# Patient Record
Sex: MALE | Race: BLACK OR AFRICAN AMERICAN
[De-identification: names, ages, dates, MRNs, and addresses within clinical notes are randomized per-mention and may not be internally consistent; named-entity substitution may affect disease eponyms.]

---

## 2018-08-22 ENCOUNTER — APPOINTMENT (OUTPATIENT)
Dept: INTERNAL MEDICINE | Facility: CLINIC | Age: 58
End: 2018-08-22
Payer: COMMERCIAL

## 2018-08-22 VITALS
DIASTOLIC BLOOD PRESSURE: 88 MMHG | TEMPERATURE: 98 F | WEIGHT: 150 LBS | BODY MASS INDEX: 22.73 KG/M2 | OXYGEN SATURATION: 97 % | HEART RATE: 63 BPM | SYSTOLIC BLOOD PRESSURE: 122 MMHG | HEIGHT: 68 IN

## 2018-08-22 DIAGNOSIS — A53.9 SYPHILIS, UNSPECIFIED: ICD-10-CM

## 2018-08-22 DIAGNOSIS — M54.32 SCIATICA, LEFT SIDE: ICD-10-CM

## 2018-08-22 DIAGNOSIS — M54.31 SCIATICA, RIGHT SIDE: ICD-10-CM

## 2018-08-22 PROCEDURE — 99215 OFFICE O/P EST HI 40 MIN: CPT

## 2018-08-22 NOTE — ASSESSMENT
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 — Radiculopathy is the medical term for the pain, numbness, or tingling that occurs when nerves coming from the spinal cord get pinched or damaged. Radiculopathy can affect different parts of the body, depending on which nerve or group of nerves is affected. People sometimes refer to radiculopathy as having a "pinched nerve."\par \par Here are 2 common examples of radiculopathy:\par \par ?Cervical radiculopathy – People with this type of radiculopathy have pain, numbness, or tingling down one or both arms. The condition happens when one or more of the nerves that go from the spine to the arm get pinched or damaged.\par \par \par ?Lumbosacral radiculopathy – People with this type of radiculopathy have pain, numbness, or tingling in the buttocks or down the leg. The condition happens when one or more of the nerves that go from the spine to the foot and leg get pinched or damaged. (People sometimes refer to the symptoms of this type of radiculopathy as "sciatica.")\par \par \par What causes radiculopathy? — Radiculopathy is usually caused by a problem with the back. To understand radiculopathy, it's helpful to first learn a little about the back and spine.\par \par The back is made up of (figure 1): \par \par ?Vertebrae – A stack of bones that sit on top of one another like a stack of coins. Each of these bones has a hole in the center. When stacked, the bones form a hollow tube that protects the spinal cord.\par \par \par ?Spinal cord and nerves – The spinal cord is the highway of nerves that connects the brain to the rest of the body. It runs through the vertebrae. Nerves branch from the spinal cord and pass in between the vertebrae. From there they connect to the arms, the legs, and the organs. (This is why problems in the back can cause leg pain or bladder problems.)\par \par \par ?Discs – Rubbery discs sit in between each of the vertebrae to add cushion and allow movement. The discs have a tough outer shell and jelly-like center.\par \par \par ?Muscles, tendons, and ligaments – Together the muscles, tendons, and ligaments are called the "soft tissues" of the back. These soft tissues support the back and help hold it together.\par \par \par Radiculopathy can happen when changes in the back cause a nerve to get pinched or damaged. This can happen if:\par \par ?The vertebrae form bumps called bone spurs, which press on nearby nerves. (People with a condition called "spinal stenosis" often have this problem.)\par \par \par ?The discs between the vertebrae break open and bulge out, causing them to press on or irritate nearby nerves. (A disc that breaks open and bulges is called a "herniated disc.") \par \par \par ?Other medical conditions, such as diabetes, infection, inflammation, or a tumor injure the nerves near the spinal cord.\par \par \par Should I see a doctor or nurse? — If you have new pain, numbness, or tingling that seems to spread out to your arms or legs from your spine, see a doctor or nurse.\par \par Will I need tests? — Maybe. Doctors can tell a lot about a person's radiculopathy based on which parts of the body are affected and how. Because of that, you might not need any tests, especially if you have had symptoms only for a short time. Still, if your doctor or nurse is concerned about nerve damage, he or she might order one or more of these tests:\par \par ?Imaging tests – Imaging tests, such as X-rays, MRIs, or CT scans, create pictures of the inside of the body. These imaging tests can show problems with the back like those described above.\par \par \par ?Electromyography (also called "EMG") – During this test, a technician checks how well electrical pulses travel across nerves to the part of your body that has symptoms. The test helps show whether the nerves controlling that body part are working right.\par \par \par How is radiculopathy treated? — Many people with radiculopathy do not need formal treatment. In some cases, the radiculopathy goes away as the back and nerves heal. In other cases, people find ways to cope with their symptoms.\par \par When people do get treatment, the treatments can include:\par \par ?Pain medicines that you can get without a prescription (If these do not work, stronger prescription pain medicines are available.)\par \par \par ?Medicines to relax the muscles (called muscle relaxants)\par \par \par ?Avoiding activities that make the pain worse\par \par \par ?Injections of medicines that numb the back or reduce swelling\par \par \par ?Physical therapy to learn special exercises and stretches\par \par \par ?Surgery to repair the problem causing symptoms \par  Peripheral artery disease (PAD), specifically atherosclerotic disease leading to peripheral artery obstruction, may be silent or present with a variety of symptoms and signs indicative of extremity ischemia. The clinical manifestations of arterial insufficiency (regardless of etiology) are due to a lack of blood flow to the musculature relative to its metabolism, which results in pain in the affected muscle groups. The presence of an extremity ulcer is one of the more obvious clinical signs that can be due to ischemia, but other manifestations, such as claudication and rest pain, should be actively sought out and differentiated from nonatherosclerotic and nonvascular conditions to ensure timely referral to a vascular specialist, when indicated. Ideally, a multidisciplinary approach involving the primary care provider, medical specialists, podiatrist, vascular specialist (interventionalist and/or surgeon), and plastic surgery provides optimal medical and surgical care. PAD is a treatable condition. When recognized early and appropriately managed, complications that can lead to limb loss can be minimized. \par \par The clinical features and diagnosis of lower extremity PAD are reviewed here. The epidemiology, risk factors, and natural history of PAD, as well as the medical and surgical treatment of PAD, are discussed elsewhere. (See "Epidemiology, risk factors, and natural history of peripheral artery disease" and "Surgical management of claudication" and "Percutaneous interventional procedures in the patient with lower extremity claudication".)\par \par The majority of patients with PAD have atherosclerotic disease of the lower extremity. Atherosclerotic PAD of the upper extremity is much less common and is discussed elsewhere. (See "Overview of upper extremity peripheral artery disease".) \par \par PERIPHERAL ARTERY DISEASE — Many pathologic processes can cause arterial obstruction leading to symptoms of arterial insufficiency because of reduced blood flow. In this topic, we focus on atherosclerosis as a cause of progressive narrowing of the lower extremity arteries, or as a source of atheroembolization. \par \par Variable terminology has been used to refer to arterial disease that is due to atherosclerosis. Older terms include arteriosclerotic disease and peripheral artery occlusive disease. In many resources, the term peripheral artery disease (PAD) is regarded as a general term encompassing a range of noncoronary arterial syndromes caused by the altered structure and function of the aorta and peripheral arteries due to numerous pathophysiological processes [1-3]. We regard the term PAD as denoting arterial disease affecting the peripheral (noncoronary) vasculature due to atherosclerosis, distinguishing PAD from other processes, which are given below and discussed in separate topic reviews. (See 'Other causes of arterial obstruction' below.)\par \par Epidemiology and risk factors — Risk factors for PAD are similar to those that promote the development of coronary atherosclerosis. (See "Overview of established risk factors for cardiovascular disease" and "Overview of possible risk factors for cardiovascular disease".)\par \par The American College of Cardiology/American Heart Association (ACC/AHA) guidelines on PAD identified the following groups with a higher prevalence of PAD [1]:\par \par ?Age =70 years\par \par \par ?Age 50 to 69 years with a history of smoking or diabetes \par \par \par ?Age 40 to 49 with diabetes and at least one other risk factor for atherosclerosis\par \par \par ?Leg symptoms suggestive of claudication with exertion, or ischemic pain at rest \par \par \par ?Abnormal lower extremity pulse examination\par \par \par ?Known atherosclerosis at other sites (eg, coronary, carotid, renal artery disease) \par \par \par Other risk factors include male gender, black ethnicity, a family history of atherosclerosis, smoking, hypertension, hyperlipidemia, and homocysteinemia. These risk factors and those listed above are discussed in detail elsewhere. (See "Epidemiology, risk factors, and natural history of peripheral artery disease".)\par \par CLINICAL PRESENTATIONS — Patients with PAD often have no complaints. However, if the supply of blood fails to satisfy ongoing metabolic requirements as a consequence of arterial narrowing, symptoms will occur, the severity of which depends upon the degree of arterial narrowing, number of arteries affected, and the activity level of the patients. PAD can present with pain of one or more lower extremity muscle groups related to activity (ie, intermittent claudication), atypical pain, pain at rest, or with nonhealing wounds, ulceration, or gangrene. (See 'Symptoms' below.)\par \par The 2005 American College of Cardiology/American Heart Association (ACC/AHA) guidelines on PAD suggested the following distribution of clinical presentation of PAD in patients =50 years of age [1,3]:\par \par ?Asymptomatic – 20 to 50 percent\par \par ?Atypical leg pain – 40 to 50 percent\par \par ?Classic claudication – 10 to 35 percent\par \par ?Threatened limb – 1 to 2 percent\par \par \par Asymptomatic patients screened for PAD — Patients with peripheral artery disease may be diagnosed based upon screening (algorithm 1). Recommendations for screening are discussed elsewhere. (See "Screening for lower extremity peripheral artery disease".)\par \par Screening identifies PAD by lower extremity examination and/or measurement of the ankle-brachial index (ANI). (See 'Diagnosis of lower extremity PAD' below.)\par \par Detection of asymptomatic PAD has value because it identifies patients at increased risk of atherosclerosis at other sites. Patients with asymptomatic PAD identified on screening also benefit from medical therapies (eg, aspirin, lipid lowering, blood pressure control, smoking cessation) that reduce their risk for myocardial infarction (MI), stroke, and death. (See "Prevention of cardiovascular disease events in those with established disease or at high risk".)\par \par Intermittent claudication and atypical lower extremity pain — Most symptomatic patients with PAD present with lower extremity pain, either as classic intermittent claudication or atypical leg pain. Intermittent claudication (derived from the Latin word for limp) is defined as a reproducible discomfort of a defined group of muscles that is induced by exercise and relieved with rest. Although the supply of blood may be adequate to meet the demands of inactive muscle, a mismatch develops between the supply of blood and increased demand induced by activity. This mismatch may also lead to atypical lower extremity pain. (See 'Claudication' below and 'Atypical extremity pain' below.)\par \par Atypical symptoms may be more common than classic claudication due to comorbidities, physical inactivity, and alterations in pain perception [4-6]. A low rate of classic claudication was noted in the report from the [employer] program, in which [id number] patients were at risk for PAD (either age =70 or age 50 to 69 with a history of diabetes or more than 10 pack-years of cigarette smoking) in a primary care setting [4]. PAD, identified by the ANI or by history, was present in 29 percent:\par \par ?Among the patients with a new diagnosis of PAD, approximately 47 percent had no history of leg symptoms, 47 percent had atypical leg symptoms, and only 6 percent had classic claudication.\par \par \par ?Among the patients with known prior PAD, approximately 25 percent had no leg symptoms, 61 percent had atypical leg symptoms, and 14 percent had classic claudication.\par \par \par Threatened limb — Patients with PAD can present initially with a threatened limb (critical limb ischemia). It has been estimated that 1 to 2 percent of patients with PAD who are 50 years of age or older present in this manner [1,3]. Limb threat is part of a broad disease spectrum, of which perfusion is one determinant of outcome. Other important factors include the extent and severity of any wounds, and the presence and severity of infection. (See 'Ulceration' below and 'Gangrene' below.)\par \par Ischemia sufficient to threaten a limb occurs when arterial blood flow is insufficient to meet the metabolic demands of resting muscle or tissue. Patients may present with varying degrees of tissue loss or frankly gangrenous digits, forefoot, or hindfoot. \par \par According to the 2007 Inter-Society Consensus for the Management of Peripheral Arterial Disease (TASC II), limb ischemia that manifests as a sudden decrease in limb perfusion presenting within two weeks of the inciting event is defined as acute [7]. Patients with similar manifestations who present more than two weeks later are considered to have chronic ischemia.\par \par Although acute limb ischemia sufficient to threaten the lower extremity is more commonly due to thromboembolism (eg, atrial fibrillation), it can be due to a sudden thrombotic occlusion of a narrowed arterial segment, occlusion from atheroembolic debris shed from a more proximal location, or occlusion due to arterial dissection. In patients who have undergone prior intervention for PAD, acute limb ischemia may be due to stent or bypass graft thrombosis. Acute limb ischemia can be stratified by symptoms (eg, pain, paresthesias) and physical findings (eg, severely diminished or absent pulses, coolness), which help to determine treatment (table 1). The clinical manifestations, diagnosis, and treatment of acute limb ischemia are discussed in detail elsewhere. (See "Clinical features and diagnosis of acute lower extremity ischemia".)\par \par Chronic ischemia sufficient to threaten the limb is often the result of arterial stenoses or occlusions that affect more than one level of the arterial tree; however, isolated tibial vessel disease frequently threatens the viability of the lower extremity in patients with diabetes, and in the elderly. This most often involves the aortoiliac and femoropopliteal segments, or femoropopliteal and tibial segments [3]. Multiple levels of disease promote severe ischemia by reducing the effectiveness of collateral flow and by lowering distal systolic pressures that drive tissue perfusion. The major manifestations of chronic ischemia are rest pain, ischemic ulceration, and gangrene. (See 'Ischemic rest pain' below and 'Ulceration' below and 'Gangrene' below.)\par \par CLINICAL FEATURES\par \par History — The prevalence of PAD increases progressively with age, beginning after age 40. As a result, PAD is growing as a clinical problem due to the aging population in the [place] and other developed countries. As such, a standard review during the examination of older patients should always include questions related to a history of walking impairment, extremity pain that might be due to ischemia, and the presence of nonhealing wounds [1]. \par \par Specific questions include:\par \par ?Does the patient have any pain with ambulation? If so, how far can the patient walk before the pain occurs? Does the pain cause the patient to stop walking? If so, after how much time is the patient able to resume walking? Does the pain recur after a similar walking distance? Has the patient's ability to walk diminished over time or altered the patient's lifestyle in any way?\par \par \par ?Does the patient experience any pain in the extremity that wakens them from sleep? If so, where is the pain located? Is the pain relieved once the foot is hung over the side of the bed? Does pain cause the patient to sleep sitting in a chair?\par \par \par ?Has the patient noticed any nonhealing wounds or ulcers on the toes? If so, how long have the wounds or ulcers been present? If wounds have occurred in the past, what measures were used to promote healing?\par \par \par ?Is the patient known to have PAD? If so, has the patient undergone any prior interventions to manage PAD, or other arterial disease?\par \par \par Patients with risk factors for PAD who report no or few symptoms should be asked about functional capacity and decline in activity over time. Several questionnaires estimating walking ability, functional capacity, and the psychosocial effects of PAD are available [8-16]. Although useful for quantifying data and following patients participating in clinical studies, these have limited use in routine clinical practice. \par \par Symptoms\par \par Lower extremity pain — Lower extremity pain is the predominant symptom in patients with PAD and is due to varying degrees of ischemia. Patients with PAD may complain of pain in the calf, thigh, or buttock brought on with activity and relieved with rest (ie, intermittent claudication), atypical leg pain, or constant pain in the forefoot aggravated by elevation and relieved by dependency (ie, rest pain). Less commonly, patients may present with diffuse, severe lower extremity pain as a manifestation of acute extremity ischemia.\par \par Claudication — Pain within a defined group of muscles that is induced by exercise and relieved with rest defines classic intermittent claudication (derived from the Latin word for limp). The severity of claudication symptoms reported by the patient depends upon the degree of stenosis, the effectiveness of collateral vascular channels, and the vigor of exercise. The perception of claudication can range from a bothersome discomfort of little consequence to a severe, debilitating pain that becomes lifestyle limiting [17]. A classification of PAD disease severity is presented elsewhere. (See "Classification of acute and chronic lower extremity ischemia", section on 'Symptom classification'.) \par \par Classic symptoms of claudication manifest as exertional leg pain that begins after a certain walking distance, causes the patient to stop walking, and resolves within 10 minutes of rest, allowing the patient to resume walking again, typically for the same distance after which the pain recurs. Claudication can present unilaterally or bilaterally, as buttock and hip, thigh, calf, or foot pain, singly or in combination. The usual relationships between pain location and corresponding anatomic site of PAD are as follows:\par \par ?Buttock and hip claudication – Patients with aortoiliac disease may complain of buttock, hip, and, in some cases, thigh claudication. The pain is often described as aching in nature and may be associated with weakness of the hip or thigh with walking. Pulses in one or both groins are diminished. Bilateral aortoiliac PAD that is severe enough to cause lower extremity symptoms almost always causes erectile dysfunction in men. Leriche syndrome is the triad of claudication, absent or diminished femoral pulses, and erectile dysfunction [18,19]. \par \par \par ?Thigh claudication – Atherosclerotic occlusion of the common femoral artery may induce claudication in the thigh, calf, or both. Patients with disease isolated to the superficial femoral or popliteal arteries have normal groin pulses but decreased pulses distally.\par \par \par ?Calf claudication – Calf claudication is the most common complaint. It is usually described as escalating pain that is consistently reproduced with exercise and relieved with rest. Pain in the upper two-thirds of the calf is usually due to superficial femoral artery stenosis, whereas pain in the lower third of the calf is due to popliteal disease. \par \par \par ?Foot claudication – Claudication of the foot is usually accompanied by occlusive disease of the tibial and peroneal vessels. Isolated foot claudication is uncommon with PAD.\par \par \par Atypical extremity pain — Among patients diagnosed with PAD, reports of atypical symptoms may be more common than classic claudication given comorbidities, physical inactivity, and alterations in pain perception [4-6]. In patients with multiple medical comorbidities, it is sometimes difficult to separate the contributions to extremity pain by arthritis, neuropathy, spinal stenosis, fibromyalgia, statin-induced myalgia, and other entities from those of PAD. Extremity pain due to PAD has its kathleen in the muscles and typical or atypical should follow an exercise-induced and rest-relieving pattern of onset and resolution with a repeatable cycle. Nevertheless, before attributing atypical symptoms to PAD, other lower extremity disorders must be considered [4,5,7]. (See 'Nonarterial etiologies for limb pain' below.) \par \par Ischemic rest pain — A severe decrease in limb perfusion can result in ischemic rest pain due to diffuse pedal ischemia. Ischemic rest pain is typically localized in the forefoot and toes and is not readily controlled by analgesics. Ischemic rest pain is brought on, or made worse by, elevation of the lower extremity, and is often worse when the patient reclines [20]. The pain can also be felt more proximally, but when this occurs, the pain usually does not spare the foot. Pain can be more localized in patients who develop an ischemic ulcer or gangrenous digits. (See 'Nonhealing wound/ulcer' below and 'Skin discoloration/gangrene' below.)\par \par Affected patients frequently find that the pain is relieved by hanging their feet over the edge of the bed, or, paradoxically (in contrast to claudication), by walking around the room because of the gravitational effect of dependence on extremity perfusion. \par \par Chronic reductions in extremity blood flow can also lead to a superimposed ischemic neuropathic pain that is frequently described as throbbing or burning, and/or severe shooting pains in the limb. In patients with diabetes, differentiating diabetic neuropathy from combined diabetic ischemic neuropathy can be difficult. (See "Evaluation of the diabetic foot".) \par \par Severe diffuse pain — Diffuse acute limb ischemia is characterized by the sudden onset of extremity pain progressing to numbness and finally paralysis of the extremity, accompanied by pallor, paresthesias, coolness, and absence of palpable pulses. In patients with PAD, diffuse ischemia can be due to atheroembolism, thrombotic occlusion of a stenotic artery, or thrombosis of a vascular prior stent or vascular reconstruction [21]. \par \par Nonhealing wound/ulcer — Ischemic ulcers often begin as minor traumatic wounds and then fail to heal because the blood supply is insufficient to meet the increased demands of the healing tissue (picture 1) [21]. Ischemic ulcers, which most often involve the foot, can become infected and may lead to osteomyelitis. In patients who are bedbound, lower extremity pressure ulcers can develop, and fail to heal with standard therapies. In patients with diabetes, nonhealing ulcers can develop over points of bony pressure. The general assessment of wounds and special considerations for pressure ulcers and diabetic ulcers are discussed in detail elsewhere. (See "Clinical assessment of chronic wounds" and "Epidemiology, pathogenesis, and risk assessment of pressure-induced skin and soft tissue injury" and "Evaluation of the diabetic foot".)\par \par Skin discoloration/gangrene — Extremity ischemia alters the appearance of the skin. Patients may notice focal areas of discoloration or skin color changes when their foot is elevated (pale or white) or lowered (redness). If the blood supply falls below what is necessary to meet minimal metabolic requirements, focal areas of ischemia that begin as skin discoloration can progress to full-thickness skin necrosis, which can progress into the deeper tissues (picture 2) [21]. \par \par The blue toe syndrome (picture 3), usually due to embolic occlusion of digital arteries with atheroembolic material from a proximal arterial source, may progress to a nonhealing ulcer or focal areas of gangrene if severe PAD is present. (See "Embolism from atherosclerotic plaque: Atheroembolism (cholesterol crystal embolism)".)\par \par Physical examination — Individuals with risk factors and those with symptoms suspicious for PAD (eg, claudication, rest pain, ulcer, gangrene) should undergo cardiovascular assessment. (See 'Epidemiology and risk factors' above.)\par \par The patient's vital signs should be recorded and abnormalities noted. The patient's temperature and blood pressure in each upper extremity should be documented and the higher of the two noted for ankle-brachial index calculation. Fever may indicate the presence of an infected ulcer, and the presence of tachycardia and tachypnea may support the diagnosis of a deep space infection of the foot that may not be readily apparent on physical examination. \par \par The vascular examination is best performed with the patient supine on the examination table and should be performed only after the patient has rested for at least 15 minutes and has warmed up if coming indoors from cold weather. Patients with advanced ischemia who do not tolerate having their feet elevated can be briefly placed supine to examine the abdomen and femoral vessels and thereafter seated upright to perform the remainder of the examination. The examination should include inspection of the skin of the extremities, examination of the abdomen, palpation of all peripheral pulses, auscultation for bruits, and extremity neurologic examination. \par \par The vascular examination in patients with PAD commonly reveals diminished or absent pulses below the level of arterial stenosis with occasional bruits over stenotic lesions and evidence of poor wound healing in the area of diminished perfusion [22,23]. Other physical findings may include an abnormal body habitus, skin color and nail changes, and abnormal venous filling time [22]. These physical signs help determine the extent and distribution of vascular disease.\par \par Extremity appearance — Changes in extremity appearance depend on the duration and severity of PAD. With significantly diminished blood flow, the skin becomes thin with functional loss of the dermal appendages, which is evident as dry, shiny, and hairless skin. However, one study found that a lack of lower extremity hair is not a predictor of PAD [24]. The nails may become brittle, hypertrophic, and ridged. Comparison of color and trophic changes between extremities can give a good indication of the severity of PAD unless bilateral disease is present, in which case the appearance of the extremities may approximate each other, and experience of the examiner is required to  the severity. \par \par Skin temperature and color — The color of the skin is produced by blood in the subpapillary layer and varies with temperature of the skin, position of the extremity, and degree of blood oxygenation (reduced hemoglobin appears blue). \par \par Skin temperature is an indicator of the blood flow rate in the dermal vessels, though flow is governed primarily by constriction or dilation of the arterioles to maintain a constant core temperature. The temperature of the skin as a marker of perfusion is useful and can be assessed by lightly palpating the skin with the back of the hand and comparing similar sites from one extremity to the other. An ischemic limb is cool, and demarcation of temperature gives a rough indication of the level of the occlusion. Assessment of temperature differences is confounded when both extremities are affected.\par \par The Buerger test involves first elevating the foot with the patient in the supine position and waiting until the veins have completely drained, and then placing the foot in a dependent position [25]. Elevation of the extremity above the level of the central venous pressure (rarely greater than 25 cm) permits the pooled venous blood to drain, allowing an accurate assessment of the degree of arterial flow [26]. The time of return of blood to the dependent extremity is a useful marker of the severity of disease (normally <20 seconds). \par \par ?The normal extremity will remain pink with elevation. \par \par \par ?Patients with significant PAD will have foot pallor with elevation, and, in the dependent position, a dusky flush will occur spreading proximally from the toes. The color can be rubrous or cyanotic depending on skin temperature.  \par \par \par ?In patients with chronic arterial occlusion, the arterioles are maximally dilated as a compensatory response to the chronic ischemia, which intensifies skin color changes. \par \par \par ?It is important to differentiate the rubor associated with arterial insufficiency from cellulitis accompanying an infective process. A red, cellulitic appearance will persist despite extremity elevation.\par \par \par ?In patients with acute arterial occlusion, the venules empty, leading to a chalky white skin appearance regardless of extremity position. \par \par \par Ulceration — Extremity ulcerations have a characteristic appearance depending upon their origin (table 2). Ulcerations caused by ischemia are typically located at the termination of arterial branches. They are commonly found on the tips of the toes and between the digits. Ischemic ulcers also form at sites of increased focal pressure, such as the lateral malleolus and metatarsal heads. The lesions often appear dry and punched out and are painful, but exhibit little bleeding. Ischemic ulcers are usually associated with the other clinical features of chronic ischemia, including pallor, hair loss, and nail changes, as discussed above. \par \par Some patients have combined arterial and venous disease and manifest signs of both arterial and venous insufficiency, including ulcers with a mixed etiology. Similarly, patients with diabetes can have arterial disease and peripheral neuropathy, each of which can contribute to ulcer formation. (See 'Nonarterial etiologies for ulceration' below.)\par \par Gangrene — In addition to ulcers, patients can have a frankly gangrenous digit(s), forefoot, or hindfoot. Gangrene can be described as either dry or wet. Dry gangrene is characterized by a hard, dry texture, usually occurring in the distal aspects of toes and fingers, often with a clear demarcation between viable and black, necrotic tissue. This form of gangrene is common in patients with PAD. Wet gangrene is characterized by its moist appearance, gross swelling, and blistering [27]. Wet gangrene represents a surgical emergency, and appropriate consultation should be made when identified.\par \par Pulses — The assessment of pulses of the patient with suspected PAD should include palpation of the brachial, radial, femoral, popliteal, dorsalis pedis, and posterior tibial arteries. The normal popliteal artery is often unable to be easily palpated but can usually be identified with Doppler. Assessment for bruits with a stethoscope should also be undertaken over the iliac arteries. The inability to easily palpate a given vessel should lead to interrogation with a handheld continuous wave Doppler ultrasound. (See "Noninvasive diagnosis of arterial disease", section on 'Continuous wave Doppler'.)\par \par Bedside ANI — The resting ankle-brachial systolic pressure index (ANI) is a simple test that can be performed at the bedside and should be measured in patients with one or more findings consistent with PAD on the review of symptoms or other findings on physical exam. The ANI is the ratio of the ankle systolic blood pressure divided by the brachial systolic pressure detected with a Doppler probe. In patients with no or mild to moderate symptoms, an ANI of <0.90 has a high degree of sensitivity and specificity for PAD, using arteriography as the reference standard [7]. Techniques for performing the ANI are discussed separately. (See "Noninvasive diagnosis of arterial disease", section on 'Ankle-brachial index'.)\par \par An attempt can be made to obtain an ANI in patients with more severe ischemia at the bedside; however, the patient may not tolerate inflation of the blood pressure cuff on the affected extremity, and Doppler signals in the pedal vessels may be too weak to accurately gauge cut-off pressures. (See 'Site and severity of PAD' below.)\par \par Neurologic assessment — Lower extremity neurologic examination is important and should include motor and sensory testing. In the patient with acute limb ischemia, sensory loss and progressive lower extremity motor loss are ominous signs indicating the need for prompt intervention. Patients with acute arterial or graft thrombosis superimposed upon chronic ischemia may be more tolerant depending on the effectiveness of collateral vessels. \par \par Chronic ischemia can cause varying patterns of sensory loss, progressing from distal to proximal as the severity of ischemia worsens. Diabetic patients may have a superimposed sensory neuropathy, which is typically in a glove-and-stocking distribution and reduced vibration sense and two-point discrimination. The use of monofilament gauges (Semmel-Jose Alberto) is a good objective way to assess for diabetic neuropathy. (See "Evaluation of the diabetic foot", section on 'Screening tests

## 2018-08-22 NOTE — HISTORY OF PRESENT ILLNESS
[FreeTextEntry1] : leg pain [de-identified] : Pt is a 58 yr old who is having pain in his lower back pain and radiates sown leg and has pain in his testicles and numbness in both legs  this started 2 yrs ago and was in right leg and then transferred to left leg and has constant pain.  He states last month he his right leg gave way and he fell to the floor.  When he is sleeping he feels a throbbing and wakes him up.  No swelling . He smoked cigarettes since May 2018 .  he has smoked for 45 yrs 1ppd. No redness of his skin over legs or swelling.  he states he cant climb steps and has sever pain in his legs and pins and needles.  he states he has pain with walking .  Problem 2 He states his partner cheated on him in May he had a test and told he is negative for HIV.  He wanted to be on prevention since he has been with a partner for 20 yrs who cheated and is concerned about contacting HIV.

## 2018-08-22 NOTE — COUNSELING
[Healthy eating counseling provided] : healthy eating [Sleep ___ hours/day] : Sleep [unfilled] hours/day [Engage in a relaxing activity] : Engage in a relaxing activity [Plan in advance] : Plan in advance [None] : None [Good understanding] : Patient has a good understanding of lifestyle changes and the steps needed to achieve self management goals

## 2018-08-22 NOTE — PHYSICAL EXAM
[No Acute Distress] : no acute distress [Well Nourished] : well nourished [Well Developed] : well developed [Well-Appearing] : well-appearing [Normal Sclera/Conjunctiva] : normal sclera/conjunctiva [PERRL] : pupils equal round and reactive to light [EOMI] : extraocular movements intact [Normal Oropharynx] : the oropharynx was normal [No JVD] : no jugular venous distention [Supple] : supple [No Respiratory Distress] : no respiratory distress  [Clear to Auscultation] : lungs were clear to auscultation bilaterally [No Accessory Muscle Use] : no accessory muscle use [Normal Percussion] : the chest was normal to percussion [Regular Rhythm] : with a regular rhythm [Normal S1, S2] : normal S1 and S2 [Normal Rate] : normal [Normal S1] : normal S1 [Normal S2] : normal S2 [No Murmur] : no murmurs heard [No Pitting Edema] : no pitting edema present [2+] : left 2+ [No Abnormalities] : the abdominal aorta was not enlarged and no bruit was heard [Soft, Nontender] : the abdomen was soft and nontender [No Mass] : no masses were palpated [No HSM] : no hepatosplenomegaly noted [Normal Posterior Cervical Nodes] : no posterior cervical lymphadenopathy [Normal Anterior Cervical Nodes] : no anterior cervical lymphadenopathy [Normal Kyphosis] : normal kyphosis [No Visual Abnormalities] : no visible abnormalities [Normal Lordosis] : normal lordosis [No Scoliosis] : no scoliosis [No Tenderness to Palpation] : no spine tenderness on palpation [No Masses] : no masses [Full ROM] : full ROM [No Pain with ROM] : no pain with motion in any direction [Intact] : all reflexes within normal limits bilaterally [Normal Station and Gait] : the gait and station were normal [Normal Motor Tone] : the muscle tone was normal [Involuntary Movements] : no involuntary movements were seen [Complexion Dark] : dark complexion [Multiple Tattoos] : multiple tattoos observed [Normal] : the cranial nerves were intact [Sensation Tactile Decrease] : light touch was intact [Sensation Pain / Temperature Decrease] : pain and temperature sensation was intact [Normal Mental Status] : the patient's orientation, memory, attention, language and fund of knowledge were normal [Appropriate] : appropriate [S3] : no S3 [S4] : no S4 [Rt] : no varicose veins of the right leg [Lt] : no varicose veins of the left leg [Right Carotid Bruit] : no bruit heard over the right carotid [Left Carotid Bruit] : no bruit heard over the left carotid [Right Femoral Bruit] : no bruit heard over the right femoral artery [Left Femoral Bruit] : no bruit heard over the left femoral artery [Bruit] : no bruit heard [Abnormal Color] : normal color and pigmentation [Skin Lesions 1] : no skin lesions were observed [Skin Turgor Decreased] : normal skin turgor [Impaired judgment] : intact judgment [Impaired Insight] : intact insight

## 2018-08-22 NOTE — PLAN
[FreeTextEntry1] :  pt has sciatica and had mri but I don’t see results in chart and will try and find out results.  he had it at [facility] which is closed he thinks.  he will see spine specialist and start Pt and also see neurologist again.  he will also have ct of lung for smoking and also vascular disease evaluation . I feel it is less claudication but he does have long hx of smoking.  colon cancer screening we again discussed this along with compliance and taking care of his health I spent 40 mins face to face  with pt  discussing his findings and recommendations  health smoking he stopped on memorial day

## 2018-08-23 LAB
25(OH)D3 SERPL-MCNC: 23.1 NG/ML
ALBUMIN SERPL ELPH-MCNC: 4.7 G/DL
ALP BLD-CCNC: 117 U/L
ALT SERPL-CCNC: 25 U/L
ANION GAP SERPL CALC-SCNC: 17 MMOL/L
AST SERPL-CCNC: 31 U/L
BASOPHILS # BLD AUTO: 0.01 K/UL
BASOPHILS NFR BLD AUTO: 0.2 %
BILIRUB SERPL-MCNC: 0.4 MG/DL
BUN SERPL-MCNC: 12 MG/DL
CALCIUM SERPL-MCNC: 10.1 MG/DL
CHLORIDE SERPL-SCNC: 102 MMOL/L
CHOLEST SERPL-MCNC: 197 MG/DL
CHOLEST/HDLC SERPL: 2.8 RATIO
CO2 SERPL-SCNC: 21 MMOL/L
CREAT SERPL-MCNC: 1.01 MG/DL
CRP SERPL-MCNC: <0.1 MG/DL
EOSINOPHIL # BLD AUTO: 0.16 K/UL
EOSINOPHIL NFR BLD AUTO: 2.8 %
ERYTHROCYTE [SEDIMENTATION RATE] IN BLOOD BY WESTERGREN METHOD: 14 MM/HR
FOLATE SERPL-MCNC: 7.3 NG/ML
GLUCOSE SERPL-MCNC: 88 MG/DL
HBV SURFACE AB SER QL: REACTIVE
HBV SURFACE AG SER QL: NONREACTIVE
HCT VFR BLD CALC: 46.8 %
HCV AB SER QL: NONREACTIVE
HCV S/CO RATIO: 0.16 S/CO
HDLC SERPL-MCNC: 70 MG/DL
HGB BLD-MCNC: 15.5 G/DL
HIV1+2 AB SPEC QL IA.RAPID: NONREACTIVE
IMM GRANULOCYTES NFR BLD AUTO: 0.4 %
LDLC SERPL CALC-MCNC: 106 MG/DL
LYMPHOCYTES # BLD AUTO: 2.74 K/UL
LYMPHOCYTES NFR BLD AUTO: 48.1 %
MAGNESIUM SERPL-MCNC: 1.9 MG/DL
MAN DIFF?: NORMAL
MCHC RBC-ENTMCNC: 32.1 PG
MCHC RBC-ENTMCNC: 33.1 GM/DL
MCV RBC AUTO: 96.9 FL
MONOCYTES # BLD AUTO: 0.39 K/UL
MONOCYTES NFR BLD AUTO: 6.8 %
NEUTROPHILS # BLD AUTO: 2.38 K/UL
NEUTROPHILS NFR BLD AUTO: 41.7 %
PLATELET # BLD AUTO: 216 K/UL
POTASSIUM SERPL-SCNC: 4 MMOL/L
PROT SERPL-MCNC: 7.7 G/DL
PSA SERPL-MCNC: 7.83 NG/ML
RBC # BLD: 4.83 M/UL
RBC # FLD: 13.6 %
SODIUM SERPL-SCNC: 140 MMOL/L
T PALLIDUM AB SER QL IA: NEGATIVE
TRIGL SERPL-MCNC: 103 MG/DL
VIT B12 SERPL-MCNC: 929 PG/ML
VZV AB TITR SER: POSITIVE
VZV IGG SER IF-ACNC: 508.6 INDEX
WBC # FLD AUTO: 5.7 K/UL

## 2018-09-24 ENCOUNTER — APPOINTMENT (OUTPATIENT)
Dept: INTERNAL MEDICINE | Facility: CLINIC | Age: 58
End: 2018-09-24
Payer: COMMERCIAL

## 2018-09-24 VITALS
SYSTOLIC BLOOD PRESSURE: 120 MMHG | WEIGHT: 151 LBS | HEIGHT: 68 IN | DIASTOLIC BLOOD PRESSURE: 88 MMHG | TEMPERATURE: 97.9 F | BODY MASS INDEX: 22.88 KG/M2 | OXYGEN SATURATION: 99 %

## 2018-09-24 DIAGNOSIS — R00.1 BRADYCARDIA, UNSPECIFIED: ICD-10-CM

## 2018-09-24 LAB — HEMOCCULT STL QL IA: NEGATIVE

## 2018-09-24 PROCEDURE — 99396 PREV VISIT EST AGE 40-64: CPT | Mod: 25

## 2018-09-24 PROCEDURE — 93000 ELECTROCARDIOGRAM COMPLETE: CPT

## 2018-09-24 PROCEDURE — 99406 BEHAV CHNG SMOKING 3-10 MIN: CPT | Mod: 25

## 2018-09-24 PROCEDURE — 82270 OCCULT BLOOD FECES: CPT

## 2018-09-24 NOTE — COUNSELING
[Healthy eating counseling provided] : healthy eating [Keep Food Diary] : Keep food diary [Low Fat Diet] : Low fat diet [Low Salt Diet] : Low salt diet [___ min/wk activity recommended] : [unfilled] min/wk activity recommended [Walking] : Walking [Sleep ___ hours/day] : Sleep [unfilled] hours/day [Engage in a relaxing activity] : Engage in a relaxing activity [Plan in advance] : Plan in advance [None] : None [Good understanding] : Patient has a good understanding of lifestyle changes and the steps needed to achieve self management goals

## 2018-09-24 NOTE — HEALTH RISK ASSESSMENT
[Good] : ~his/her~  mood as  good [FreeTextEntry1] : back pain  [] : No [One fall no injury in past year] : Patient reported one fall in the past year without injury [0] : 2) Feeling down, depressed, or hopeless: Not at all (0) [de-identified] : has appt with Dr Mesa [de-identified] : vaps smoked since age 12 1ppd stopped in  may 2018 [[id number]] : 0 [HIV Test offered] : HIV Test offered [Hepatitis C test offered] : Hepatitis C test offered [Change in mental status noted] : No change in mental status noted [Language] : denies difficulty with language [Behavior] : denies difficulty with behavior [Learning/Retaining New Information] : denies difficulty learning/retaining new information [Handling Complex Tasks] : denies difficulty handling complex tasks [Reasoning] : denies difficulty with reasoning [Spatial Ability and Orientation] : denies difficulty with spatial ability and orientation [None] : None [With Significant Other] : lives with significant other [# of Members in Household ___] :  household currently consist of [unfilled] member(s) [Employed] : employed [High School] : high school [Single] : single [# Of Children ___] : has [unfilled] children [Sexually Active] : sexually active [Feels Safe at Home] : Feels safe at home [Fully functional (bathing, dressing, toileting, transferring, walking, feeding)] : Fully functional (bathing, dressing, toileting, transferring, walking, feeding) [Fully functional (using the telephone, shopping, preparing meals, housekeeping, doing laundry, using] : Fully functional and needs no help or supervision to perform IADLs (using the telephone, shopping, preparing meals, housekeeping, doing laundry, using transportation, managing medications and managing finances) [Reports changes in hearing] : Reports no changes in hearing [Reports changes in vision] : Reports no changes in vision [Reports changes in dental health] : Reports changes in dental health [Smoke Detector] : smoke detector [Carbon Monoxide Detector] : carbon monoxide detector [Guns at Home] : no guns at home [Safety elements used in home] : safety elements used in home [Seat Belt] :  uses seat belt [Sunscreen] : does not use sunscreen [Travel to Developing Areas] : does not  travel to developing areas [TB Exposure] : is not being exposed to tuberculosis [Caregiver Concerns] : does not have caregiver concerns [BoneDensityComments] : hasn’t gone [ColonoscopyComments] : needs to be scheduled   [HIVDate] : 8/22/18 [HepatitisCDate] : 8/22/18 [de-identified] : last eye exam 4 months ago [de-identified] : last exam 4 months ago [Discussed at today's visit] : Advance Directives Discussed at today's visit

## 2018-09-24 NOTE — HISTORY OF PRESENT ILLNESS
[FreeTextEntry1] : cpe  [de-identified] : pt is a 58 yr old man who came for his cpe Pt states he fell while walking when his knee gave out  and didn’t sustain any injuries.  He has pins and needles in his legs constantly for two years and is getting worse  in the past 7 months.  and had osteoarthritis of his spine . He was given referrals for Pt and spine specialist .

## 2018-09-24 NOTE — ADDENDUM
[FreeTextEntry1] : Colorectal cancer develops in the large intestine (colon) or rectum. The primary goal of colon cancer screening is to prevent deaths from colon cancer. Screening tests can help identify cancers at an early and potentially curable stage. Screening can also prevent the development of cancer by identifying and treating precancerous abnormal growths that can be removed before they become malignant.\par \par Adults should undergo colon cancer screening beginning at age 50 or earlier, depending upon their risk of developing colorectal cancer. Several tests are available, each of which has advantages and disadvantages. The optimal screening test depends upon your preferences and your risk of developing colon cancer.\par \par This article discusses colon cancer risks, available screening tests, and recommendations for screening based upon your risks. There are additional topics about the screening tests themselves (see "Patient education: Flexible sigmoidoscopy (Beyond the Basics)" and "Patient education: Colonoscopy (Beyond the Basics)"), as well as about particular conditions. (See "Patient education: Colon polyps (Beyond the Basics)" and "Patient education: Crohn disease (Beyond the Basics)" and "Patient education: Ulcerative colitis (Beyond the Basics)".)\par \par WHY COLON CANCER SCREENING WORKS — Most colorectal cancers develop from precancerous polyps. Polyps are growths that arise in the lining of the colon and are visible when the bowel is examined by endoscopy (colonoscopy or sigmoidoscopy) or computed tomography (CT) scan (CT colonography [CTC]). There are two types of polyps: adenomatous and hyperplastic. Adenomatous polyps can become cancerous over time; this progression takes at least 10 years in most people.\par \par Colon cancer screening tests work by detecting polyps or early-stage cancers. If a polyp or cancer is found, it is removed to prevent it from becoming more serious. Regular screening for and removal of polyps reduces your risk of developing colorectal cancer (by up to 90 percent with colonoscopy). Early detection of cancers that are already present in the colon increases the chance of successful treatment and decreases the chance of dying as a result of the cancer.\par \par COLON CANCER RISK FACTORS — Several common characteristics increase the risk of colorectal cancer. While each individual risk factor adds some risk, risk can be substantially increased if several are present together.\par \par Factors that increase risk\par \par ?Family history of colorectal cancer – Having colorectal cancer in a family member increases your risk of cancer if the family member is a first-degree relative (a parent, brother or sister, or child), if several family members are affected, or if the cancers occurred at an early age (eg, before age 45 years). (See 'Family history of colorectal cancer' below.)\par \par \par ?Prior colorectal cancer or polyps – People who have previously had colorectal cancer have an increased risk of developing a new colorectal cancer. People who have had adenomatous polyps before the age of 60 years are also at increased risk for developing colorectal cancer. Screening recommendations for these groups are discussed separately. (See "Patient education: Colon polyps (Beyond the Basics)" and "Patient education: Colon and rectal cancer (Beyond the Basics)".)\par \par \par ?Increasing age – Although the average person has a 4.5 percent lifetime risk of developing colorectal cancer, 90 percent of these cancers occur in people older than 50 years of age. Risk increases with age throughout life.\par \par \par ?Lifestyle factors – Several lifestyle factors increase the risk of colorectal cancer, including:\par \par \par •A diet high in fat and red or processed meat and low in fiber\par \par •A sedentary lifestyle\par \par •Cigarette smoking\par \par •Alcohol use\par \par •Obesity\par \par \par Large increase in risk — Some conditions greatly increase the risk of colorectal cancer.\par \par Familial adenomatous polyposis — Familial adenomatous polyposis (FAP) is an uncommon inherited condition. Nearly 100 percent of people with this condition will develop colorectal cancer during their lifetime, and most of these cancers occur before the age of 50 years. FAP causes hundreds of polyps to develop throughout the colon beginning in adolescence. (See "Clinical manifestations and diagnosis of familial adenomatous polyposis".)\par \par Hereditary nonpolyposis colon cancer — Hereditary nonpolyposis colon cancer (HNPCC, also called Hernández syndrome) is another inherited condition associated with an increased risk of colorectal cancer. It is slightly more common than FAP but is still uncommon, accounting for less than 1 in 20 cases of colorectal cancer. About 70 percent of people with HNPCC will experience colorectal cancer by the age of 65. Cancer also tends to occur at younger ages. People with HNPCC are also at risk for other types of cancer, including cancer of the uterus, stomach, bladder, kidney, and ovary. (See "Hernández syndrome (hereditary nonpolyposis colorectal cancer): Clinical manifestations and diagnosis".)\par \par There are other rarer inherited conditions that increase risk of colorectal cancer, including mutY DNA glycosylase gene (MUTYH)-associated polyposis, hamartomatous polyposis, Peutz-Jeghers syndrome, and juvenile polyposis syndrome.\par \par Inflammatory bowel disease — People with Crohn disease of the colon or ulcerative colitis have an increased risk of colorectal cancer. The amount of increased risk depends upon the amount of inflamed colon and the duration of disease; pancolitis (inflammation of the entire colon) and colitis of 10 years' duration or longer are associated with the greatest risk for colorectal cancer. The risk of colon cancer is not increased in people with irritable bowel disease.\par \par Factors that may decrease risk — Aspirin, and related nonsteroidal antiinflammatory medications, may decrease the risk of developing colorectal cancer. (See "Patient education: Aspirin in the primary prevention of cardiovascular disease and cancer (Beyond the Basics)".)\par \par COLON CANCER SCREENING TESTS — Several tests available for colorectal cancer screening can detect precancerous polyps (adenomas) and can lead to cancer prevention and/or detect cancers at an early, more treatable stage.\par \par Guidelines from expert groups recommend that you and your health care provider discuss the available options and choose a testing strategy that makes sense for you. Some experts believe that tests that detect precancerous polyps are preferable, particularly colonoscopy [1]. Other experts believe that being screened (with any of the available tests, including stool tests that detect blood) is more important than the particular test that is chosen for the screening.\par \par Colonoscopy — Colonoscopy allows a clinician to see the lining of the rectum and the entire colon (figure 1). (See "Patient education: Colonoscopy (Beyond the Basics)".)\par \par ?Procedure – Colonoscopy requires that you prepare by cleaning out your entire colon and rectum. This usually involves consuming a liquid medication that causes temporary diarrhea. You are usually given a mild sedative drug before the procedure. During colonoscopy, a thin, lighted tube is used to directly view the lining of the rectum and the entire colon. Biopsies (small samples of tissue) can be taken during the procedure. Polyps and some cancers can be removed during this procedure.\par \par \par ?Effectiveness – Colonoscopy detects most small polyps and almost all large polyps and cancers and substantially reduces the risk of developing and dying from colorectal cancer [2].\par \par \par ?Risks and disadvantages – The risks of colonoscopy, while small, are greater than those of other screening tests. Colonoscopy may lead to serious bleeding or a tear of the intestinal wall in some individuals (about 1 in 1,000). Because the procedure usually requires sedation, you must be accompanied home after the procedure and you should not return to work or other activities on the same day.\par \par \par Sigmoidoscopy — Sigmoidoscopy allows a clinician to directly view the lining of the rectum and the lower part of the colon (the descending colon) (figure 1). This area accounts for about one-half of the total area of the rectum and colon. (See "Patient education: Flexible sigmoidoscopy (Beyond the Basics)".)\par \par ?Procedure – Sigmoidoscopy requires that you prepare by cleaning out the lower bowel. This usually involves consuming a clear liquid diet and using an enema shortly before the examination. Most people do not need sedative drugs and are able to return to work or other activities the same day. During the procedure, a thin, lighted tube is advanced into the rectum and through the left side of the colon to check for polyps and cancer; the procedure may cause mild cramping. Biopsies (small samples of tissue) can be taken during sigmoidoscopy. Sigmoidoscopy may be performed in a doctor's office.\par \par \par ?Effectiveness – Sigmoidoscopy can identify polyps and cancers in the descending colon and rectum with a high degree of accuracy. Studies have shown that sigmoidoscopy reduces the incidence of colorectal cancer and overall mortality [3].\par \par \par ?Risks and disadvantages – The risks of sigmoidoscopy are small. The procedure could create a small tear in the intestinal wall in about 2 per every 10,000 people; death from this complication is rare. A major disadvantage of sigmoidoscopy is that it cannot detect polyps or cancers that are located only in the right side (eg, the cecum or ascending colon to the hepatic flexure) or in the transverse colon, which are more common in older women.\par \par \par ?Additional testing – Having polyps or cancers in the lower colon increases the likelihood that there are polyps or cancer in the remaining part of the colon. Thus, if sigmoidoscopy reveals polyps or cancer, colonoscopy is recommended to view the entire length of the colon.\par \par \par CT colonography ("virtual colonoscopy") — Computed tomography colonography (CTC, sometimes called "virtual colonoscopy") is a test that uses a CT scanner to take images of the entire bowel. These images are two- and three-dimensional and are reconstructed to allow a radiologist to determine if polyps or cancers are present (picture 1). The major advantages of CTC are that it does not require sedation, it is noninvasive, the entire bowel can be examined, and abnormal areas (adenomas) can be detected about as well as with traditional (optical) colonoscopy.\par \par There are several disadvantages of CTC. Like traditional colonoscopy, CTC usually requires a bowel prep to clean out the colon. If an abnormal area is found with CTC, a traditional colonoscopy will be needed to see the area and take a tissue sample (biopsy). CTC may detect abnormalities other than polyps or cancer in the colon/rectum. Many of these incidental findings will require further testing that could lead to harm. CTC may not be covered by health insurance plans in the [place]. CTC, like many other imaging tests, exposes patients to radiation which may have long-term risks.\par \par Stool tests — Colorectal cancers often release microscopic amounts of blood and abnormal DNA into the stool. Stool tests can detect blood or abnormal DNA markers.\par \par Two types of tests, called guaiac tests (Hemoccult SENSA) and immunochemical tests, evaluate the stool for blood, which may be present if there is bleeding from a colon cancer (or other source of blood).\par \par ?With guaiac testing, you collect two samples of stool from three consecutive bowel movements, which you apply to home collection cards. You mail the cards back according to the instructions. You should avoid drugs that irritate the stomach, such as aspirin and nonsteroidal antiinflammatory drugs (NSAIDs), before collecting the stool.\par \par \par ?With immunochemical testing, you use a long-handled tool to collect the specimen according to the 's instructions. You apply the brush to a kit and then mail the kit back according to instructions. You do not have to change your diet or stop any medications with this test. Immunochemical testing is more convenient and somewhat better able to find cancer than guaiac testing, but the test kit is a bit more expensive.\par \par \par Stool testing for blood, when performed once per year, reduces the risk of dying from colorectal cancer by one-third or more [4]. However, because polyps seldom bleed, stool testing for blood is less likely to detect polyps than other screening tests. In addition, only 2 to 5 percent of people with a positive stool test actually have colorectal cancer. If the stool test is positive, your entire colon should be examined with colonoscopy.\par \par A DNA test is another option and is done every three years. This test looks for specific DNA markers that may signify the presence of a colon cancer, and it also looks for blood in the stool. For this test, you get a special kit in order to collect a whole bowel movement. Then you follow the instructions about how and where to ship it. An abnormal test should be followed up by colonoscopy.\par \par Fecal occult blood test and sigmoidoscopy — Combined screening with a fecal immunochemical test and sigmoidoscopy is a possible screening strategy and may be more effective than either test done alone. The sensitive fecal occult blood test can be used in place of the immunochemical test if necessary.\par \par COLON CANCER SCREENING PLANS — The colon cancer screening plan that is right for you depends upon your risk of colorectal cancer.\par \par Average risk of colorectal cancer — People with an average risk of colorectal cancer should begin screening at age 50. Any one of the following screening strategies is recommended [1]:\par \par ?Colonoscopy every 10 years\par \par ?Computed tomographic colonography (CTC) every five years\par \par ?Flexible sigmoidoscopy every five years, with or without an immunochemical stool test\par \par ?Stool testing every year (for guaiac and immunochemical occult blood tests)\par \par ?Stool testing every three years using a DNA assay and a collection of a full bowel movement\par \par \par Increased risk of colorectal cancer — Screening plans for people with an increased risk may entail screening at a younger age, more frequent screening, and/or the use of more sensitive screening tests (usually colonoscopy). The optimal screening plan depends upon the reason for increased risk.\par \par Family history of colorectal cancer\par \par ?People who have one first-degree relative (parent, brother, sister, or child) with colorectal cancer or adenomatous polyps at a young age (before the age of 60 years), or two first-degree relatives diagnosed at any age, should begin screening for colon cancer earlier, typically at age 40, or 10 years younger than the earliest diagnosis in their family, whichever comes first. Screening usually involves colonoscopy every five years. (See "Screening for colorectal cancer in patients with a family history of colorectal cancer".)\par \par \par ?People who have one first-degree relative (parent, brother, sister, or child) who has experienced colorectal cancer or adenomatous polyps at age 60 or later, or two or more second-degree relatives (grandparent, aunt, uncle) with colorectal cancer, should begin screening by colonoscopy earlier at age 40, but screening should be repeated as for average-risk people.\par \par \par ?People with a second-degree relative (grandparent, aunt, or uncle) or third-degree relative (great-grandparent or cousin) with colorectal cancer are considered to have an average risk of colorectal cancer. (See 'Average risk of colorectal cancer' above.)\par \par \par Some people have known genetically-based colon cancer syndromes in their family, such as familial adenomatous polyposis (FAP) or hereditary nonpolyposis colon cancer (HNPCC). These less common conditions require aggressive screening and preventive treatments, and individuals with these conditions in their family should be managed by a clinician with clinical expertise in these syndromes. (See "Familial adenomatous polyposis: Screening and management of patients and families" and "Hernández syndrome (hereditary nonpolyposis colorectal cancer): Screening and management" and "Juvenile polyposis syndrome".)\par \par Inflammatory bowel disease — People with ulcerative colitis or Crohn disease have an increased risk of colon cancer. The best screening plan depends upon how much of the colon is affected and how long you have had the disease. (See "Patient education: Crohn disease (Beyond the Basics)" and "Patient education: Ulcerative colitis (Beyond the Basics)" and "Surveillance and management of dysplasia in patients with inflammatory bowel disease".)\par \par WHERE TO GET MORE INFORMATION — Your health care provider is the best source of information for questions and concerns related to your medical problem.\par \par

## 2018-09-24 NOTE — ASSESSMENT
[FreeTextEntry1] : health maintenance Pt is up to date with his eye and dental exam. he has not gone for his bone density.  he needs a colonoscopy for colon cancer screening and we discussed options.  Smoker He will have ct of lungs and we discussed stopping and vaping  and risks  of cancer heart disease , vascular disease.  I referred him for doppler of arteries and he has not gone.  he has not seen pt or spine or neurologist . He has appt for Dr Mesa and also  for his prostate.  He has elevated psa  .  Lumbar radiculopathy  pt will need mri.  sinus bradycardia - Pt will be referred to cardiologist prior to  colonoscopy   Elevated psa he will see urologist.  Back pain He has been on meloxicam without improvement and will be started on medrol moisés  to see if he improves and understands side effects of steroids.   All topics are updated as new evidence becomes available and our peer review process is complete. \par \par  Literature review current through:  Aug 2018. |  This topic last updated:  2018. \par  \par \par INTRODUCTION — Cigarette smoking is the leading preventable cause of mortality, responsible for nearly six million deaths worldwide and over 400,000 deaths in the [place] annually [1,2]. If current trends continue, tobacco will kill more than eight million people worldwide each year by the year 2030. The three major causes of smoking-related mortality are atherosclerotic cardiovascular disease, lung cancer, and chronic obstructive pulmonary disease (COPD) [2].\par \par Smokers who stop smoking reduce their risk of developing and dying from tobacco-related illnesses [3,4]. Screening all patients for tobacco use and providing smokers with behavioral counseling and pharmacotherapy to stop smoking are among the most valuable preventive services that can be offered in health care [5].\par \par This topic will discuss the benefits and risks of smoking cessation. Management of smoking cessation, including the use of behavioral and pharmacologic therapies, is discussed in detail separately. (See "Overview of smoking cessation management in adults" and "Behavioral approaches to smoking cessation" and "Pharmacotherapy for smoking cessation in adults".)\par \par BENEFITS OF SMOKING CESSATION — Smoking cessation is associated with substantial health benefits for all smokers [6]. The extent of benefit partly depends on the intensity and duration of prior tobacco smoke exposure. Smokers who stop smoking can be expected to live longer and are less likely to develop tobacco-related diseases, including coronary heart disease, cancer, and pulmonary disease. Smokers also benefit from quitting smoking even after the development of smoking-related diseases, such as coronary heart disease or chronic obstructive pulmonary disease (COPD).\par \par All-cause mortality — Up to one-half of all smokers can be expected to die from a tobacco-related illness [1]. In one population-based cohort of nearly 50,000 people aged 40 to 70 years in [place], the years of life lost were 1.4 years in women and 2.7 years in men among those who smoked =20 cigarettes daily, compared with those who never smoked [7].\par \par Smoking cessation is associated with a mortality benefit for both men and women of all ages [4,7-10]. Stopping smoking at younger ages, especially before age 40, is associated with a larger decline in premature mortality than stopping at a later age [3,7,11]. However, quitting smoking after age 60 years is still associated with a lower risk of death compared with older adults who continue to smoke [7,10,12,13]. Even in smokers over age 80, quitting smoking appears to reduce mortality [12].\par \par Cardiovascular disease — Cigarette smoking is estimated to be responsible for >10 percent of all cardiovascular deaths worldwide [14] and 33 percent of all cardiovascular deaths in the [place] [15]. (See "Cardiovascular risk of smoking and benefits of smoking cessation", section on 'Dose and duration of smoking exposure'.)\par \par Nicotine from tobacco use can lead to several harmful effects on the cardiovascular system, including coronary vasoconstriction, increased hypercoagulability, dyslipidemia, and endothelial dysfunction. Smoking cessation is associated with a substantial reduction in the risk of cardiovascular events (including myocardial infarction, sudden cardiac death, and stroke) for both individuals with and without a prior history of cardiovascular disease. (See "Cardiovascular risk of smoking and benefits of smoking cessation".)\par \par Smoking cessation also reduces the progression of symptomatic peripheral artery disease and is associated with a reduced risk of recurrent stroke. (See "Overview of secondary prevention of ischemic stroke".)\par \par Malignancy — Smoking is a major risk factor for many types of cancer (table 1), and tobacco cessation is associated with a reduction in cancer risk. Among smokers with a smoking-related cancer, smoking cessation may also decrease the risk of developing a second smoking-related malignancy [16]. The relationships between smoking and different types of cancer are discussed in detail separately:\par \par ?(See "Cigarette smoking and other possible risk factors for lung cancer".)\par \par ?(See "Multiple primary lung cancers".)\par \par ?(See "Factors that modify breast cancer risk in women", section on 'Smoking'.)\par \par ?(See "Epidemiology and risk factors for head and neck cancer", section on 'Tobacco products'.)\par \par ?(See "Risk factors for gastric cancer", section on 'Smoking'.)\par \par ?(See "Epidemiology and nonfamilial risk factors for exocrine pancreatic cancer", section on 'Cigarette smoking'.)\par \par ?(See "Epidemiology, pathology, and pathogenesis of renal cell carcinoma", section on 'Smoking'.)\par \par ?(See "Epidemiology and risk factors of urothelial (transitional cell) carcinoma of the bladder", section on 'Smoking cessation'.)\par \par ?(See "Epithelial carcinoma of the ovary, fallopian tube, and peritoneum: Epidemiology and risk factors", section on 'Cigarette smoking'.)\par \par ?(See "Cervical intraepithelial neoplasia: Terminology, incidence, pathogenesis, and prevention", section on 'Cigarette smoking'.)\par \par ?(See "Carcinoma of the penis: Epidemiology, risk factors, and pathology", section on 'Other factors'.)\par \par ?(See "Classification and epidemiology of anal cancer", section on 'Cigarette smoking'.)\par \par ?(See "Cancer prevention", section on 'Tobacco use'.)\par \par \par Pulmonary disease — Epidemiologic studies indicate that cigarette smoking is overwhelmingly the most important risk factor for COPD.\par \par Smoking cessation reduces the accelerated decline of lung function and risk of incident COPD associated with smoking [17]. In addition, the majority of smokers with cough and sputum production with early COPD have an improvement in symptoms in the first 12 months after cessation [18]. The risk of COPD exacerbations also declines over time after smoking cessation [19]. (See "Chronic obstructive pulmonary disease: Risk factors and risk reduction", section on 'Smoking cessation'.)\par \par Smoking is associated with other chronic lung conditions, including asthma and respiratory bronchiolitis. Smoking cessation leads to an improvement in respiratory symptoms in these patients. (See "Risk factors for asthma", section on 'Smoking and exposure to environmental tobacco smoke' and "Respiratory bronchiolitis-associated interstitial lung disease", section on 'Smoking cessation'.)\par \par Infections — Cigarette smoking is associated with an increased risk of several types of infection, including tuberculosis, pneumococcal pneumonia, Legionnaires disease, meningococcal disease, influenza, and the common cold [20,21]. Although smoking cessation may reduce the risk of several types of infection, there are little data available to support this. (See "Epidemiology of tuberculosis", section on 'Risk factors' and "Pneumococcal pneumonia in adults", section on 'Smoking' and "Microbiology, epidemiology, and pathogenesis of Legionella infection", section on 'Host risk factors' and "Epidemiology of Neisseria meningitidis infection", section on 'Other host factors'.)\par \par Diabetes — The number of cigarettes smoked daily is associated with an increased risk for developing type 2 diabetes mellitus over the long-term. This may be partly due to nicotine’s effect on impaired insulin sensitivity. Although there does appear to be an increased risk of developing type 2 diabetes shortly after quitting tobacco use (perhaps partly due to weight gain), smoking cessation reduces the risk of diabetes after several years of abstinence [22]. (See "Risk factors for type 2 diabetes mellitus", section on 'Smoking'.)\par \par Osteoporosis and hip fracture — Smoking accelerates bone loss and is a risk factor for hip fracture in women [23]. Smoking cessation can reverse loss of bone mineral density and decrease the excess risk of hip fracture after approximately 10 years after quitting tobacco use (relative risk [RR] 0.7, 95% CI 0.5-0.9) [24,25]. (See "Osteoporotic fracture risk assessment", section on 'Cigarette smoking' and "Overview of the management of osteoporosis in postmenopausal women", section on 'Cessation of smoking'.)\par \par Reproductive disorders — Smoking is associated with an increased risk of several reproductive disorders, including complications during pregnancy, premature menopause, erectile dysfunction, and subfertility in both men and women. In particular, maternal smoking is associated with spontaneous , ectopic pregnancy, lower birth weight, and a number of diseases in the fetus, some of which may develop late in life. Maternal smoking cessation results in improved fetal and maternal outcomes. (See "Cigarette and tobacco products in pregnancy: Impact on pregnancy and the ".)\par \par Peptic ulcer disease — Gastric and duodenal ulcer disease is more likely to occur and take more time to heal in smokers compared with nonsmokers [26]. Smoking is associated with Helicobacter pylori infection, a well-established etiologic agent for peptic ulcer disease [27]. Persistent smoking increases treatment failure rates for H. pylori eradication [27]. Smoking cessation decreases the risk of developing peptic ulcer disease and accelerates the rate of healing in established disease [28,29]. (See "Epidemiology and etiology of peptic ulcer disease".)\par \par Periodontal disease — The number of cigarettes smoked daily is associated with an increased risk of developing periodontal disease, including gingivitis and periodontitis [30]. In a large population-based survey, the risk of periodontitis in former smokers declined with the number of years after smoking cessation [31]. (See "Gingivitis and periodontitis in adults: Classification and dental treatment".)\par \par Postoperative complications — Smoking cessation prior to surgery may prevent postoperative complications, including delayed wound healing and pulmonary complications. In addition, longer periods of smoking cessation prior to surgery are associated with lower rates of postoperative complications. (See "Preoperative medical evaluation of the adult healthy patient", section on 'Smoking'.)\par \par Other — Smoking has also been associated other adverse health effects. A study using pooled data from five large cohorts including over 420,000 men and 530,000 women aged =55 years found that compared with never-smokers, current smokers had an increased risk of mortality from renal failure (RR 2.0; 95% CI 1.7-2.3), intestinal ischemia (RR 6.0, 95% CI 4.5-8.1), hypertensive heart disease (RR 2.4, 95% CI 1.9-3.0), any infection (RR 2.3, 95% CI 2.0-2.7), breast cancer (RR 1.3, 95% CI 1.2-1.5), and prostate cancer (RR 1.4, 95% CI 1.2-1.7) [32]. The study also found an increased risk of mortality from respiratory illnesses other than pneumonia, influenza, COPD, and pulmonary fibrosis (RR 2.0, 95% CI 1.6-2.4). Smoking cessation decreased the risks, with the risks continuing to decrease as the duration of smoking cessation increased.\par \par QUESTIONABLE UTILITY OF SMOKING REDUCTION — Reducing the number of cigarettes smoked daily has been advocated as a possible alternative to complete cessation in patients who are unable to quit smoking. However, few data are available that support a strategy for a reduction in smoking, compared with complete cessation. Smoking reduction remains controversial as a strategy for reducing the health risks of smokers [33]. Complete smoking cessation is preferable.\par \par Health risks are found even with consistent low-level smoking (10 or fewer cigarettes a day). A prospective cohort study of people aged 59 to 82 years at baseline found all-cause mortality and cancer incidence were higher among consistent low-level smokers than never-smokers [34,35]. Those who smoked 1 to 10 cigarettes daily had higher all-cause mortality (hazard ratio [HR] 1.87, 95% CI 1.64-2.13); even those who smoked <one cigarette daily had an elevated risk (HR 1.64, 95% CI 1.07-2.51) [34]. People consistently smoking 1 to 10 cigarettes daily were also 2.34 (95% CI 5 1.86-2.93) times more likely to develop a smoking-related cancer [35]. Quitting smoking lowered their risks; the younger they were when quitting, the lower their risks.\par \par Data are inconsistent as to whether reducing cigarette smoking from higher to lower levels is associated with improved outcomes. At least two prospective cohort studies found that smokers who reduced smoking by at least 50 percent had no change in all-cause mortality, whereas those who quit smoking completely had decreased risks of all-cause mortality [36,37]. However, another cohort study did find reduced risk for mortality associated with smoking reduction (HR 0.85, 95% CI 0.77-0.95); the benefit with smoking reduction was mainly seen in heavy smokers and was mainly due to a reduction in cardiovascular mortality [38]. Nevertheless, consistent benefits in cardiovascular disease risk have not been seen with reduction in smoking short of quitting [15]. This is because even low levels of tobacco smoke exposure increase cardiovascular risk. A separate cohort study found that a reduction in smoking may decrease the risk of lung cancer (table 2) [39]. (See "Cigarette smoking and other possible risk factors for lung cancer", section on 'Smoking reduction'.)\par \par One reason that a reduction in smoking may not consistently improve health outcomes is that smokers may compensate for smoking reduction with increased puffs, volume, or duration in order to maintain nicotine intake and forestall nicotine withdrawal symptoms.\par \par RISKS OF SMOKING CESSATION — Although the risks of smoking cessation are far outweighed by the benefits, these risks are important to address in order to maximize the likelihood that a patient will successfully quit tobacco use. (See "Behavioral approaches to smoking cessation".)\par \par Nicotine withdrawal syndrome — Nicotine is a potent psychoactive drug that causes physical dependence and tolerance [6]. In the absence of nicotine, a smoker develops cravings for cigarettes and symptoms of the nicotine withdrawal syndrome. Symptoms generally peak in the first three days and subside over the next three to four weeks, but smokers’ cravings for cigarettes may persist for months to years. Nicotine withdrawal symptoms include:\par \par ?Increased appetite or weight gain\par \par ?Dysphoric, depressed mood, or anhedonia [40]\par \par ?Insomnia\par \par ?Irritability, frustration, or anger\par \par ?Anxiety\par \par ?Difficulty concentrating\par \par ?Restlessness\par \par \par These factors should be addressed so that smokers will know what to expect and how to respond if these symptoms occur. Smoking cessation medications, including nicotine replacement therapy, bupropion, and varenicline, relieve the symptoms of nicotine withdrawal (see "Pharmacotherapy for smoking cessation in adults"). Nonpharmacologic approaches can also help to manage nicotine withdrawal symptoms. (See "Behavioral approaches to smoking cessation".)\par \par Weight gain — Weight gain commonly occurs after cessation of smoking. Weight gain is a principal fear of those who are considering smoking cessation, especially women. The mechanisms behind weight gain appear to be decreased metabolic rate, increased activity of lipoprotein lipase, changes in food preferences, and increased caloric intake [41]. Weight gain of 1 to 2 kg in the first two weeks is usually followed by an additional 2 to 3 kg weight gain over the next four to five months [42,43]. The average total weight gain is 4 to 5 kg, but may be much greater. Ten percent or more of quitters may gain over 13 kg after smoking cessation. In general, the amount of weight gain is greater in women than men, nonwhites compared with whites, and heavier smokers compared with lighter smokers [44].\par \par While there are well-recognized health hazards of obesity (see "Overweight and obesity in adults: Health consequences"), these are outweighed by the health benefits of quitting smoking, which are much larger than the additional risk conferred by the weight gain. Behavioral counseling that addresses weight gain, including dietary or physical activity interventions, has some success in limiting weight gain (see "Behavioral approaches to smoking cessation"). Weight gain can also be temporarily blunted in smokers who quit with bupropion, an antidepressant medication effective for smoking cessation. (See "Pharmacotherapy for smoking cessation in adults", section on 'Bupropion'.)\par \par Depression — Nicotine withdrawal syndrome includes symptoms of depression and anxiety, particularly in those with a history of psychiatric illness. In patients with documented depression, smoking cessation has been reported to trigger depressive episodes that require behavioral counseling, antidepressant medication, or both [45,46]. However, despite this increase in depressive symptoms, subsequent studies have found that the benefits of smoking cessation outweigh the risks in patients with psychiatric illness [47,48]. (See "Unipolar depression in adults: Assessment and diagnosis".)\par \par It is unclear if smoking cessation causes depression in smokers who do not have psychiatric illness at baseline. In one large cohort of smokers without depression or anxiety, there was no increase in symptoms of depression or anxiety after quitting tobacco use [49].\par \par Cough and mouth ulcers — A temporary increase in cough and aphthous ulcers can occur in the first few weeks after stopping smoking [50-52]. The pathophysiology is not well-understood. Coughing and aphthous ulcers generally resolve several weeks after the quit date. Individuals with bronchitis who quit may also report an increase in cough, but the most common experience is a decrease is symptoms as noted above [18].\par \par COST-EFFECTIVENESS — Smoking cessation interventions, including both counseling and medications, are highly cost-effective when compared with other commonly accepted preventive health practices (table 3) [53,54]. One study examined several smoking cessation strategies with and without nicotine replacement and calculated an approximate cost of USD $2587 per net year of life gained, based upon published estimates of success and mortality differences for smokers and nonsmokers [54]. A second study examined smokers at a single institution who completed an initial 60-minute nicotine dependence consultation and six two-hour evening sessions devoted to smoking cessation [53]. The cohort was followed for one year, and based upon quit rates at that time and published mortality rates by age, sex, smoking status, and number of years since quitting, a mathematical model estimated a cost of USD $6828 per net year of life gained. Both of these estimates are considerably more favorable than those of other common interventions. As one example, treatment of mild hypertension costs approximately USD $24,000 per net year of life gained [55].\par \par Subsequent studies have found several behavioral and pharmacologic therapies for smoking cessation to be cost-effective [56]. Telephone quitlines appear to be among the most cost-effective strategies [57].\par \par INFORMATION FOR PATIENTS — [employer] offers two types of patient education materials, “The Basics” and “Beyond the Basics.” The Basics patient education pieces are written in plain language, at the 5th to 6th grade reading level, and they answer the four or five key questions a patient might have about a given condition. These articles are best for patients who want a general overview and who prefer short, easy-to-read materials. Beyond the Basics patient education pieces are longer, more sophisticated, and more detailed. These articles are written at the 10th to 12th grade reading level and are best for patients who want in-depth information and are comfortable with some medical jargon.\par \par Here are the patient education articles that are relevant to this topic. We encourage you to print or e-mail these topics to your patients. (You can also locate patient education articles on a variety of subjects by searching on “patient info” and the keyword(s) of interest.)\par \par ?Basics topics (see "Patient education: Quitting smoking (The Basics)")\par \par \par ?Beyond the Basics topics (see "Patient education: Quitting smoking (Beyond the Basics)")\par \par \par SUMMARY AND RECOMMENDATIONS\par \par ?Cigarette smoking is the leading preventable cause of mortality and is estimated to cause nearly six million deaths worldwide each year. Up to one-half of all regular smokers can be expected to die from a tobacco-related illness. The most important causes of smoking-related mortality are atherosclerotic cardiovascular disease, lung cancer, and chronic obstructive pulmonary disease (COPD). (See 'Introduction' above.)\par \par \par ?Smoking cessation is associated with substantial health benefits, including a reduced risk of coronary heart disease, cancer, pulmonary disease, infections, and hip fracture. Smokers also benefit from quitting smoking even after the development of smoking-related diseases, such as coronary heart disease or COPD. (See 'Benefits of smoking cessation' above.)\par \par \par ?Smoking cessation is associated with a mortality benefit for both men and women of all ages. Stopping smoking before age 40 is associated with a larger decline in premature mortality than stopping at a later age. (See 'All-cause mortality' above.)\par \par \par ?Smoking cessation often leads to nicotine withdrawal symptoms, including increased appetite, weight gain, depressive symptoms, anxiety, insomnia, irritability, difficulty concentrating, and restlessness. Nicotine withdrawal symptoms are temporary and can be treated by pharmacologic or behavioral treatments. (See 'Risks of smoking cessation' above.)\par \par \par ?Since tobacco use is both a learned behavior and a physical addiction to nicotine for the majority of smokers, the most effective way to promote smoking cessation is to combine both behavioral and pharmacologic therapies, which have a higher quit rate than either therapy alone. (See "Overview of smoking cessation management in adults".) colon cancer screeningColorectal cancer develops in the large intestine (colon) or rectum. The primary goal of colon cancer screening is to prevent deaths from colon cancer. Screening tests can help identify cancers at an early and potentially curable stage. Screening can also prevent the development of cancer by identifying and treating precancerous abnormal growths that can be removed before they become malignant.\par \par Adults should undergo colon cancer screening beginning at age 50 or earlier, depending upon their risk of developing colorectal cancer. Several tests are available, each of which has advantages and disadvantages. The optimal screening test depends upon your preferences and your risk of developing colon cancer.\par \par

## 2018-09-24 NOTE — PHYSICAL EXAM
[Well Developed] : well developed [Well Nourished] : well nourished [Normal Voice Quality] : was normal [Normal Verbal Skills] : the patient had normal verbal communication skills [Normal Nonverbal Skills] : normal nonverbal communication skills were demonstrated [Conjunctiva] : the conjunctiva were normal in both eyes [PERRL] : pupils were equal in size, round, and reactive to light [EOM Intact] : extraocular movements were intact [Normal Outer Ear/Nose] : the outer ears and nose were normal in appearance [Normal Oropharynx] : the oropharynx was normal [Normal TMs] : both tympanic membranes were normal [Normal Nasal Mucosa] : the nasal mucosa was normal [Normal Appearance] : was normal in appearance [Neck Supple] : was supple [No Tracheal Deviation] : the trachea was midline [Enlarged Diffusely] : was not enlarged [JVP Elevated ___cm] : the JVP was not elevated [Rate ___] : at [unfilled] breaths per minute [Normal Rhythm/Effort] : normal respiratory rhythm and effort [Clear Bilaterally] : the lungs were clear to auscultation bilaterally [Normal to Percussion] : the lungs were normal to percussion [5th Left ICS - MCL] : palpated at the 5th LICS in the midclavicular line [Heart Rate ___] : [unfilled] bpm [Premature Beats] : regular with premature beats [Bruit] : no bruit heard [Normal Rate] : normal [Normal S1] : normal S1 [Normal S2] : normal S2 [S3] : no S3 [S4] : no S4 [No Murmur] : no murmurs heard [No Pitting Edema] : no pitting edema present [Rt] : no varicose veins of the right leg [Lt] : no varicose veins of the left leg [Right Carotid Bruit] : no bruit heard over the right carotid [Left Carotid Bruit] : no bruit heard over the left carotid [Right Femoral Bruit] : no bruit heard over the right femoral artery [Left Femoral Bruit] : no bruit heard over the left femoral artery [2+] : left 2+ [No Abnormalities] : the abdominal aorta was not enlarged and no bruit was heard [Examination Of The Breasts] : a normal appearance [No Discharge] : no discharge [Soft, Nontender] : the abdomen was soft and nontender [No Mass] : no masses were palpated [No HSM] : no hepatosplenomegaly noted [None] : no CVA tenderness [Normal rectal exam] : was normal [Internal Hemorrhoid] : no internal hemorrhoids were present [External Hemorrhoid] : no external hemorrhoids were present [Tender, Swollen] : that was nontender and non-swollen [Occult Blood Positive] : was negative for occult blood [Gross Blood] : no gross blood [Stool Sample Taken] : a stool sample was obtained [Fecal Impaction] : no fecal impaction was present [Urethral Meatus] : meatus normal [Penis Abnormality] : normal circumcised penis [Urinary Bladder Findings] : the bladder was normal on palpation [Scrotum] : the scrotum was normal [Testes Mass (___cm)] : there were no testicular masses [No Prostate Nodules] : no prostate nodules [Postauricular Lymph Nodes Enlarged Bilaterally] : nodes not enlarged [Preauricular Lymph Nodes Enlarged Bilaterally] : nodes not enlarged [Submandibular Lymph Nodes Enlarged Bilaterally] : nodes not enlarged [Suboccipital Lymph Nodes Enlarged Bilaterally] : nodes not enlarged [Submental Lymph Nodes Enlarged] : nodes not enlarged [Cervical Lymph Nodes Enlarged Posterior Bilaterally] : nodes not enlarged [Cervical Lymph Nodes Enlarged Anterior Bilaterally] : nodes not enlarged [Supraclavicular Lymph Nodes Enlarged Bilaterally] : nodes not enlarged [Axillary Lymph Nodes Enlarged Bilaterally] : nodes not enlarged [Epitrochlear Lymph Nodes Enlarged Bilaterally] : nodes not enlarged [Femoral Lymph Nodes Enlarged Bilaterally] : nodes not enlarged [Inguinal Lymph Nodes Enlarged Bilaterally] : nodes not enlarged [No Lymphangitis] : no lymphangitis observed [Normal Kyphosis] : normal kyphosis [No Visual Abnormalities] : no visible abnormalities [Normal Lordosis] : normal lordosis [No Scoliosis] : no scoliosis [No Tenderness to Palpation] : no spine tenderness on palpation [No Masses] : no masses [Full ROM] : full ROM [No Pain with ROM] : no pain with motion in any direction [Intact] : all reflexes within normal limits bilaterally [Normal Station and Gait] : the gait and station were normal [Normal Motor Tone] : the muscle tone was normal [Involuntary Movements] : no involuntary movements were seen [Normal Scalp] : inspection of the scalp showed no abnormalities [Male Pattern Baldness] : male pattern baldness [Examination Of The Hair] : texture and distribution of hair was normal [Abnormal Color] : normal color and pigmentation [Complexion Dark] : dark complexion [Skin Lesions 1] : no skin lesions were observed [Tattoo - Single] : a single tattoo was observed [Skin Turgor Decreased] : normal skin turgor [Normal] : the deep tendon reflexes were normal [Normal Mental Status] : the patient's orientation, memory, attention, language and fund of knowledge were normal [Appropriate] : appropriate [Impaired judgment] : intact judgment [Impaired Insight] : intact insight [de-identified] : tongue normal teeth in good repairn papilloma on right side of pharynx.

## 2018-09-24 NOTE — PLAN
[FreeTextEntry1] : We discussed vit D and risks and understands the importance of taking the vitamin.  . Vitamin D is a nutrient found in some foods that is needed for health and to maintain strong bones. It does so by helping the body absorb calcium (one of bone’s main building blocks) from food and supplements. People who get too little vitamin D may develop soft, thin, and brittle bones, a condition known as rickets in children and osteomalacia in adults.\par \par Vitamin D is important to the body in many other ways as well. Muscles need it to move, for example, nerves need it to carry messages between the brain and every body part, and the immune system needs vitamin D to fight off invading bacteria and viruses. Together with calcium, vitamin D also helps protect older adults from osteoporosis. Vitamin D is found in cells throughout the body.\par \par How much vitamin D do I need?\par \par The amount of vitamin D you need each day depends on your age. Average daily recommended amounts from the Food and Nutrition Board (a national group of experts) for different ages are listed below in International Units (IU):\par \par \par Life Stage\par \par Recommended Amount\par \par \par Birth to 12 months 400 IU \par Children 1-13 years 600 IU \par Teens 14-18 years 600 IU \par Adults 19-70 years 600 IU \par Adults 71 years and older 800 IU \par Pregnant and breastfeeding women 600 IU \par   \par What foods provide vitamin D?\par \par Very few foods naturally have vitamin D. Fortified foods provide most of the vitamin D in American diets.\par •Fatty fish such as salmon, tuna, and mackerel are among the best sources.\par •Beef liver, cheese, and egg yolks provide small amounts.\par •Mushrooms provide some vitamin D. In some mushrooms that are newly available in stores, the vitamin D content is being boosted by exposing these mushrooms to ultraviolet light.\par •Almost all of the [place] milk supply is fortified with 400 IU of vitamin D per quart. But foods made from milk, like cheese and ice cream, are usually not fortified.\par •Vitamin D is added to many breakfast cereals and to some brands of orange juice, yogurt, margarine, and soy beverages; check the labels.\par \par Can I get vitamin D from the sun?\par \par The body makes vitamin D when skin is directly exposed to the sun, and most people meet at least some of their vitamin D needs this way. Skin exposed to sunshine indoors through a window will not produce vitamin D. Cloudy days, shade, and having dark-colored skin also cut down on the amount of vitamin D the skin makes.\par \par However, despite the importance of the sun to vitamin D synthesis, it is prudent to limit exposure of skin to sunlight in order to lower the risk for skin cancer. When out in the sun for more than a few minutes, wear protective clothing and apply sunscreen with an SPF (sun protection factor) of 8 or more. Tanning beds also cause the skin to make vitamin D, but pose similar risks for skin cancer.\par \par People who avoid the sun or who cover their bodies with sunscreen or clothing should include good sources of vitamin D in their diets or take a supplement. Recommended intakes of vitamin D are set on the assumption of little sun exposure.\par \par What kinds of vitamin D dietary supplements are available?\par \par Vitamin D is found in supplements (and fortified foods) in two different forms: D2 (ergocalciferol) and D3 (cholecalciferol). Both increase vitamin D in the blood.\par \par Am I getting enough vitamin D?\par \par Because vitamin D can come from sun, food, and supplements, the best measure of one’s vitamin D status is blood levels of a form known as 25-hydroxyvitamin D. Levels are described in either nanomoles per liter (nmol/L) or nanograms per milliliter (ng/mL), where 1 nmol/L = 0.4 ng/mL.\par \par In general, levels below 30 nmol/L (12 ng/mL) are too low for bone or overall health, and levels above 125 nmol/L (50 ng/mL) are probably too high. Levels of 50 nmol/L or above (20 ng/mL or above) are sufficient for most people.\par \par By these measures, some Americans are vitamin D deficient and almost no one has levels that are too high. In general, young people have higher blood levels of 25-hydroxyvitamin D than older people and males have higher levels than females. By race, non- blacks tend to have the lowest levels and non- whites the highest. The majority of Americans have blood levels lower than 75 nmol/L (30 ng/mL).\par \par Certain other groups may not get enough vitamin D:\par • infants, because human milk is a poor source of the nutrient.  infants should be given a supplement of 400 IU of vitamin D each day.\par •Older adults, because their skin doesn’t make vitamin D when exposed to sunlight as efficiently as when they were young, and their kidneys are less able to convert vitamin D to its active form.\par •People with dark skin, because their skin has less ability to produce vitamin D from the sun.\par •People with disorders such as Crohn’s disease or celiac disease who don’t handle fat properly, because vitamin D needs fat to be absorbed.\par •Obese people, because their body fat binds to some vitamin D and prevents it from getting into the blood.\par \par What happens if I don’t get enough vitamin D?\par \par People can become deficient in vitamin D because they don’t consume enough or absorb enough from food, their exposure to sunlight is limited, or their kidneys cannot convert vitamin D to its active form in the body. In children, vitamin D deficiency causes rickets, a condition in which the bones become soft and bend. It’s a rare disease but still occurs, especially among  infants and children. In adults, vitamin D deficiency leads to osteomalacia, causing bone pain and muscle weakness.\par \par What are some effects of vitamin D on health?\par \par Vitamin D is being studied for its possible connections to several diseases and medical problems, including diabetes, hypertension, and autoimmune conditions such as multiple sclerosis. Two of them discussed below are bone disorders and some types of cancer.\par \par Bone disorders\par \par As they get older, millions of people (mostly women, but men too) develop, or are at risk of, osteoporosis, condition in which bones become fragile and may fracture if one falls. It is one consequence of not getting enough calcium and vitamin D over the long term. Supplements of both vitamin D3 (at 700-800 IU/day) and calcium (500-1,200 mg/day) have been shown to reduce the risk of bone loss and fractures in elderly people aged 62-85 years. Men and women should talk with their healthcare providers about their needs for vitamin D (and calcium) as part of an overall plan to prevent or treat osteoporosis.\par \par Cancer\par \par Some studies suggest that vitamin D may protect against colon cancer and perhaps even cancers of the prostate and breast. But higher levels of vitamin D in the blood have also been linked to higher rates of pancreatic cancer. At this time, it’s too early to say whether low vitamin D status increases cancer risk and whether higher levels protect or even increase risk in some people.\par \par Can vitamin D be harmful?\par \par Yes, when amounts in the blood become too high. Signs of toxicity include nausea, vomiting, poor appetite, constipation, weakness, and weight loss. And by raising blood levels of calcium, too much vitamin D can cause confusion, disorientation, and problems with heart rhythm. Excess vitamin D can also damage the kidneys.\par \par The upper limit for vitamin D is 1,000 to 1,500 IU/day for infants, 2,500 to 3,000 IU/day for children 1-8 years, and 4,000 IU/day for children 9 years and older, adults, and pregnant and lactating teens and women. Vitamin D toxicity almost always occurs from overuse of supplements. Excessive sun exposure doesn’t cause vitamin D poisoning because the body limits the amount of this vitamin it produces.\par \par Are there any interactions with vitamin D that I should know about?\par \par Like most dietary supplements, vitamin D may interact or interfere with other medicines or supplements you might be taking. Here are several examples:\par •Prednisone and other corticosteroid medicines to reduce inflammation impair how the body handles vitamin D, which leads to lower calcium absorption an\par

## 2018-09-24 NOTE — DATA REVIEWED
[FreeTextEntry1] : reviewed labs ekg - sinus bradycardia rate 54/min sinus bradycardia with ocaa pvc  lvh, qrs 96ms qt 408/386ms pr 140 ms p 112ms

## 2018-10-05 ENCOUNTER — APPOINTMENT (OUTPATIENT)
Dept: ORTHOPEDIC SURGERY | Facility: CLINIC | Age: 58
End: 2018-10-05
Payer: COMMERCIAL

## 2018-10-05 PROCEDURE — 99214 OFFICE O/P EST MOD 30 MIN: CPT

## 2018-10-05 PROCEDURE — 72100 X-RAY EXAM L-S SPINE 2/3 VWS: CPT

## 2018-10-15 ENCOUNTER — OTHER (OUTPATIENT)
Age: 58
End: 2018-10-15

## 2018-10-16 ENCOUNTER — APPOINTMENT (OUTPATIENT)
Dept: UROLOGY | Facility: CLINIC | Age: 58
End: 2018-10-16
Payer: COMMERCIAL

## 2018-10-16 VITALS
BODY MASS INDEX: 22.73 KG/M2 | SYSTOLIC BLOOD PRESSURE: 110 MMHG | RESPIRATION RATE: 16 BRPM | WEIGHT: 150 LBS | HEIGHT: 68 IN | DIASTOLIC BLOOD PRESSURE: 68 MMHG | OXYGEN SATURATION: 99 % | HEART RATE: 57 BPM

## 2018-10-16 PROCEDURE — 99214 OFFICE O/P EST MOD 30 MIN: CPT

## 2018-10-17 ENCOUNTER — FORM ENCOUNTER (OUTPATIENT)
Age: 58
End: 2018-10-17

## 2018-10-18 ENCOUNTER — OUTPATIENT (OUTPATIENT)
Dept: OUTPATIENT SERVICES | Facility: HOSPITAL | Age: 58
LOS: 1 days | End: 2018-10-18
Payer: COMMERCIAL

## 2018-10-18 ENCOUNTER — APPOINTMENT (OUTPATIENT)
Dept: CT IMAGING | Facility: IMAGING CENTER | Age: 58
End: 2018-10-18
Payer: COMMERCIAL

## 2018-10-18 DIAGNOSIS — M79.605 PAIN IN LEFT LEG: ICD-10-CM

## 2018-10-18 DIAGNOSIS — F17.210 NICOTINE DEPENDENCE, CIGARETTES, UNCOMPLICATED: ICD-10-CM

## 2018-10-18 DIAGNOSIS — M79.604 PAIN IN RIGHT LEG: ICD-10-CM

## 2018-10-18 LAB
APPEARANCE: CLEAR
BACTERIA: NEGATIVE
BILIRUBIN URINE: NEGATIVE
BLOOD URINE: NEGATIVE
COLOR: YELLOW
GLUCOSE QUALITATIVE U: NEGATIVE MG/DL
KETONES URINE: NEGATIVE
LEUKOCYTE ESTERASE URINE: NEGATIVE
MICROSCOPIC-UA: NORMAL
NITRITE URINE: NEGATIVE
PH URINE: 6
PROTEIN URINE: NEGATIVE MG/DL
PSA FREE FLD-MCNC: 21.3
PSA FREE SERPL-MCNC: 0.83 NG/ML
PSA SERPL-MCNC: 3.89 NG/ML
RED BLOOD CELLS URINE: 3 /HPF
SPECIFIC GRAVITY URINE: 1.02
SQUAMOUS EPITHELIAL CELLS: 0 /HPF
UROBILINOGEN URINE: NEGATIVE MG/DL
WHITE BLOOD CELLS URINE: 0 /HPF

## 2018-10-18 PROCEDURE — G0297: CPT

## 2018-10-18 PROCEDURE — G0297: CPT | Mod: 26

## 2018-10-19 ENCOUNTER — RESULT REVIEW (OUTPATIENT)
Age: 58
End: 2018-10-19

## 2018-10-22 ENCOUNTER — OTHER (OUTPATIENT)
Age: 58
End: 2018-10-22

## 2018-10-24 ENCOUNTER — APPOINTMENT (OUTPATIENT)
Dept: NEUROLOGY | Facility: CLINIC | Age: 58
End: 2018-10-24
Payer: COMMERCIAL

## 2018-10-24 VITALS
HEIGHT: 68 IN | OXYGEN SATURATION: 98 % | DIASTOLIC BLOOD PRESSURE: 80 MMHG | HEART RATE: 71 BPM | SYSTOLIC BLOOD PRESSURE: 126 MMHG | BODY MASS INDEX: 23.34 KG/M2 | TEMPERATURE: 97.7 F | WEIGHT: 154 LBS

## 2018-10-24 PROCEDURE — 99214 OFFICE O/P EST MOD 30 MIN: CPT

## 2018-10-24 RX ORDER — METHYLPREDNISOLONE 4 MG/1
4 TABLET ORAL
Qty: 1 | Refills: 0 | Status: DISCONTINUED | COMMUNITY
Start: 2018-09-24 | End: 2018-10-24

## 2018-10-24 RX ORDER — CHOLECALCIFEROL (VITAMIN D3) 1250 MCG
1.25 MG CAPSULE ORAL
Qty: 4 | Refills: 2 | Status: DISCONTINUED | COMMUNITY
Start: 2018-08-23 | End: 2018-10-24

## 2018-10-25 ENCOUNTER — FORM ENCOUNTER (OUTPATIENT)
Age: 58
End: 2018-10-25

## 2018-10-26 ENCOUNTER — APPOINTMENT (OUTPATIENT)
Dept: MRI IMAGING | Facility: CLINIC | Age: 58
End: 2018-10-26
Payer: COMMERCIAL

## 2018-10-26 ENCOUNTER — OUTPATIENT (OUTPATIENT)
Dept: OUTPATIENT SERVICES | Facility: HOSPITAL | Age: 58
LOS: 1 days | End: 2018-10-26
Payer: COMMERCIAL

## 2018-10-26 ENCOUNTER — APPOINTMENT (OUTPATIENT)
Dept: ULTRASOUND IMAGING | Facility: CLINIC | Age: 58
End: 2018-10-26
Payer: COMMERCIAL

## 2018-10-26 ENCOUNTER — APPOINTMENT (OUTPATIENT)
Dept: RADIOLOGY | Facility: CLINIC | Age: 58
End: 2018-10-26
Payer: COMMERCIAL

## 2018-10-26 DIAGNOSIS — M79.604 PAIN IN RIGHT LEG: ICD-10-CM

## 2018-10-26 DIAGNOSIS — F17.210 NICOTINE DEPENDENCE, CIGARETTES, UNCOMPLICATED: ICD-10-CM

## 2018-10-26 DIAGNOSIS — Z00.8 ENCOUNTER FOR OTHER GENERAL EXAMINATION: ICD-10-CM

## 2018-10-26 DIAGNOSIS — M54.31 SCIATICA, RIGHT SIDE: ICD-10-CM

## 2018-10-26 PROCEDURE — 77080 DXA BONE DENSITY AXIAL: CPT | Mod: 26

## 2018-10-26 PROCEDURE — 72141 MRI NECK SPINE W/O DYE: CPT | Mod: 26

## 2018-10-26 PROCEDURE — 72148 MRI LUMBAR SPINE W/O DYE: CPT | Mod: 26

## 2018-10-26 PROCEDURE — 72141 MRI NECK SPINE W/O DYE: CPT

## 2018-10-26 PROCEDURE — 72148 MRI LUMBAR SPINE W/O DYE: CPT

## 2018-10-26 PROCEDURE — 72146 MRI CHEST SPINE W/O DYE: CPT | Mod: 26

## 2018-10-26 PROCEDURE — 77080 DXA BONE DENSITY AXIAL: CPT

## 2018-10-26 PROCEDURE — 93925 LOWER EXTREMITY STUDY: CPT | Mod: 26

## 2018-10-26 PROCEDURE — 72146 MRI CHEST SPINE W/O DYE: CPT

## 2018-10-26 PROCEDURE — 93925 LOWER EXTREMITY STUDY: CPT

## 2018-11-02 ENCOUNTER — APPOINTMENT (OUTPATIENT)
Dept: ORTHOPEDIC SURGERY | Facility: CLINIC | Age: 58
End: 2018-11-02
Payer: COMMERCIAL

## 2018-11-02 DIAGNOSIS — M47.10 OTHER SPONDYLOSIS WITH MYELOPATHY, SITE UNSPECIFIED: ICD-10-CM

## 2018-11-02 PROCEDURE — 99214 OFFICE O/P EST MOD 30 MIN: CPT

## 2018-11-08 ENCOUNTER — APPOINTMENT (OUTPATIENT)
Dept: INTERNAL MEDICINE | Facility: CLINIC | Age: 58
End: 2018-11-08
Payer: COMMERCIAL

## 2018-11-08 ENCOUNTER — LABORATORY RESULT (OUTPATIENT)
Age: 58
End: 2018-11-08

## 2018-11-08 VITALS
HEART RATE: 83 BPM | WEIGHT: 148 LBS | HEIGHT: 68 IN | BODY MASS INDEX: 22.43 KG/M2 | DIASTOLIC BLOOD PRESSURE: 74 MMHG | SYSTOLIC BLOOD PRESSURE: 130 MMHG | TEMPERATURE: 98.3 F | OXYGEN SATURATION: 96 %

## 2018-11-08 DIAGNOSIS — Z86.79 PERSONAL HISTORY OF OTHER DISEASES OF THE CIRCULATORY SYSTEM: ICD-10-CM

## 2018-11-08 PROCEDURE — 99215 OFFICE O/P EST HI 40 MIN: CPT

## 2018-11-08 NOTE — REVIEW OF SYSTEMS
[Muscle Weakness] : muscle weakness [Back Pain] : back pain [Unsteady Walk] : ataxia [Negative] : Heme/Lymph

## 2018-11-08 NOTE — PHYSICAL EXAM
[Well Developed] : well developed [Well Nourished] : well nourished [Normal Voice Quality] : was normal [Normal Verbal Skills] : the patient had normal verbal communication skills [Normal Nonverbal Skills] : normal nonverbal communication skills were demonstrated [Conjunctiva] : the conjunctiva were normal in both eyes [PERRL] : pupils were equal in size, round, and reactive to light [EOM Intact] : extraocular movements were intact [Normal Outer Ear/Nose] : the outer ears and nose were normal in appearance [Normal Oropharynx] : the oropharynx was normal [Normal TMs] : both tympanic membranes were normal [No JVD] : no jugular venous distention [Supple] : supple [No Lymphadenopathy] : no lymphadenopathy [Rate ___] : at [unfilled] breaths per minute [Normal Rhythm/Effort] : normal respiratory rhythm and effort [Clear Bilaterally] : the lungs were clear to auscultation bilaterally [Normal to Percussion] : the lungs were normal to percussion [Rhythm Regular] : regular [Rt] : no varicose veins of the right leg [Lt] : no varicose veins of the left leg [Normal Rate] : normal [Normal S1] : normal S1 [Normal S2] : normal S2 [S3] : no S3 [S4] : no S4 [No Murmur] : no murmurs heard [No Pitting Edema] : no pitting edema present [Right Carotid Bruit] : no bruit heard over the right carotid [Left Carotid Bruit] : no bruit heard over the left carotid [Right Femoral Bruit] : no bruit heard over the right femoral artery [Left Femoral Bruit] : no bruit heard over the left femoral artery [2+] : left 2+ [No Abnormalities] : the abdominal aorta was not enlarged and no bruit was heard [Soft, Nontender] : the abdomen was soft and nontender [No Mass] : no masses were palpated [No HSM] : no hepatosplenomegaly noted [None] : no CVA tenderness [Postauricular Lymph Nodes Enlarged Bilaterally] : nodes not enlarged [Preauricular Lymph Nodes Enlarged Bilaterally] : nodes not enlarged [Submandibular Lymph Nodes Enlarged Bilaterally] : nodes not enlarged [Suboccipital Lymph Nodes Enlarged Bilaterally] : nodes not enlarged [Submental Lymph Nodes Enlarged] : nodes not enlarged [Cervical Lymph Nodes Enlarged Posterior Bilaterally] : nodes not enlarged [Cervical Lymph Nodes Enlarged Anterior Bilaterally] : nodes not enlarged [Supraclavicular Lymph Nodes Enlarged Bilaterally] : nodes not enlarged [Axillary Lymph Nodes Enlarged Bilaterally] : nodes not enlarged [Epitrochlear Lymph Nodes Enlarged Bilaterally] : nodes not enlarged [Femoral Lymph Nodes Enlarged Bilaterally] : nodes not enlarged [Inguinal Lymph Nodes Enlarged Bilaterally] : nodes not enlarged [No CVA Tenderness] : no CVA  tenderness [No Joint Swelling] : no joint swelling [Abnormal Color] : normal color and pigmentation [Complexion Dark] : dark complexion [Skin Lesions 1] : no skin lesions were observed [Skin Turgor Decreased] : normal skin turgor [Normal] : normal texture and mobility [Normal Gait] : normal gait [Normal Mental Status] : the patient's orientation, memory, attention, language and fund of knowledge were normal [Appropriate] : appropriate [Impaired judgment] : intact judgment [Impaired Insight] : intact insight

## 2018-11-08 NOTE — HISTORY OF PRESENT ILLNESS
[No Pertinent Cardiac History] : no history of aortic stenosis, atrial fibrillation, coronary artery disease, recent myocardial infarction, or implantable device/pacemaker [No Pertinent Pulmonary History] : no history of asthma, COPD, sleep apnea, or smoking [Smoker] : smoker [No Adverse Anesthesia Reaction] : no adverse anesthesia reaction in self or family member [(Patient denies any chest pain, claudication, dyspnea on exertion, orthopnea, palpitations or syncope)] : Patient denies any chest pain, claudication, dyspnea on exertion, orthopnea, palpitations or syncope [Chronic Anticoagulation] : no chronic anticoagulation [Chronic Kidney Disease] : no chronic kidney disease [Diabetes] : no diabetes [FreeTextEntry1] : colon cancer screening [FreeTextEntry2] : 11/16/18 [FreeTextEntry3] : Kyle [FreeTextEntry4] : pt is a 58 yr old Man who is to have colon cancer screening.  he recently had mri revealing spinal cord stenosis and  cord compression  [FreeTextEntry7] : pt needs cardiac evaluation prior

## 2018-11-08 NOTE — PLAN
[FreeTextEntry1] : ct lung negative.  pt is to have spinal surgery and needs als0 to have pulmonary evaluation.

## 2018-11-08 NOTE — ASSESSMENT
[High Risk Surgery - Intraperitoneal, Intrathoracic or Supringuinal Vascular Procedures] : High Risk Surgery - Intraperitoneal, Intrathoracic or Supringuinal Vascular Procedures - No (0) [Ischemic Heart Disease] : Ischemic Heart Disease - No (0) [Congestive Heart Failure] : Congestive Heart Failure - No (0) [Prior Cerebrovascular Accident or TIA] : Prior Cerebrovascular Accident or TIA - No (0) [Creatinine >= 2mg/dL (1 Point)] : Creatinine >= 2mg/dL - No (0) [Insulin-dependent Diabetic (1 Point)] : Insulin-dependent Diabetic - No (0) [0] : 0 , RCRI Class: I, Risk of Post-Op Cardiac Complications: 0.4%, Procedure Risk: Low-Risk [Patient Requires Further Testing] : Patient requires further testing [Cardiology consultation] : Cardiology consultation [Modify medications prior to procedure] : Modify medications prior to procedure [As per surgery] : as per surgery [FreeTextEntry4] : Pt has not seen cardiologist and will have appt on tuesday and if cleared  and no further tesitn is warranted then he will be medically cleared for porcedrue.  It is a low risks procedure and presently cri is 0.4 % Pt is having a low risk procedure and is low risks for cardiac adverse outcome and cri is 0.4% .  he was explained the procedures colonoscopy, anesthesia   risks and complications alternatives , prep and post procedure care.  I have answered all his questions.  he will have blood testing today  .\par  [FreeTextEntry7] : hold gabapentin on day of surgery

## 2018-11-09 LAB
ANION GAP SERPL CALC-SCNC: 16 MMOL/L
APPEARANCE: CLEAR
BACTERIA: NEGATIVE
BASOPHILS # BLD AUTO: 0.01 K/UL
BASOPHILS NFR BLD AUTO: 0.2 %
BILIRUBIN URINE: NEGATIVE
BLOOD URINE: NEGATIVE
BUN SERPL-MCNC: 11 MG/DL
CALCIUM SERPL-MCNC: 10 MG/DL
CHLORIDE SERPL-SCNC: 104 MMOL/L
CO2 SERPL-SCNC: 22 MMOL/L
COLOR: YELLOW
CREAT SERPL-MCNC: 0.92 MG/DL
EOSINOPHIL # BLD AUTO: 0.07 K/UL
EOSINOPHIL NFR BLD AUTO: 1.4 %
GLUCOSE QUALITATIVE U: NEGATIVE MG/DL
GLUCOSE SERPL-MCNC: 129 MG/DL
HCT VFR BLD CALC: 47.5 %
HGB BLD-MCNC: 15.9 G/DL
IMM GRANULOCYTES NFR BLD AUTO: 0.4 %
KETONES URINE: NEGATIVE
LEUKOCYTE ESTERASE URINE: NEGATIVE
LYMPHOCYTES # BLD AUTO: 1.99 K/UL
LYMPHOCYTES NFR BLD AUTO: 40.4 %
MAN DIFF?: NORMAL
MCHC RBC-ENTMCNC: 32.8 PG
MCHC RBC-ENTMCNC: 33.5 GM/DL
MCV RBC AUTO: 97.9 FL
MICROSCOPIC-UA: NORMAL
MONOCYTES # BLD AUTO: 0.3 K/UL
MONOCYTES NFR BLD AUTO: 6.1 %
NEUTROPHILS # BLD AUTO: 2.54 K/UL
NEUTROPHILS NFR BLD AUTO: 51.5 %
NITRITE URINE: NEGATIVE
PH URINE: 6
PLATELET # BLD AUTO: 227 K/UL
POTASSIUM SERPL-SCNC: 4.2 MMOL/L
PROTEIN URINE: NEGATIVE MG/DL
RBC # BLD: 4.85 M/UL
RBC # FLD: 14 %
RED BLOOD CELLS URINE: 3 /HPF
SODIUM SERPL-SCNC: 141 MMOL/L
SPECIFIC GRAVITY URINE: 1.02
SQUAMOUS EPITHELIAL CELLS: 0 /HPF
UROBILINOGEN URINE: 1 MG/DL
WBC # FLD AUTO: 4.93 K/UL
WHITE BLOOD CELLS URINE: 1 /HPF

## 2018-11-14 ENCOUNTER — APPOINTMENT (OUTPATIENT)
Dept: CARDIOLOGY | Facility: CLINIC | Age: 58
End: 2018-11-14
Payer: COMMERCIAL

## 2018-11-14 VITALS
WEIGHT: 148 LBS | HEIGHT: 68 IN | SYSTOLIC BLOOD PRESSURE: 141 MMHG | BODY MASS INDEX: 22.43 KG/M2 | HEART RATE: 65 BPM | TEMPERATURE: 98.5 F | OXYGEN SATURATION: 98 % | DIASTOLIC BLOOD PRESSURE: 74 MMHG

## 2018-11-14 PROCEDURE — 93000 ELECTROCARDIOGRAM COMPLETE: CPT

## 2018-11-14 PROCEDURE — 99244 OFF/OP CNSLTJ NEW/EST MOD 40: CPT

## 2018-11-14 NOTE — ASSESSMENT
[FreeTextEntry1] : 57 yo M with back pain in the setting of spinal stenosis who presents for evaluation prior to planned back surgery as well as colonoscopy/EGD. Patient is able to perform > 4 METs without symptoms. Echo from 2016 shows EF 60%\par \par 1. Preprocedural evaluation: Patient's Revised Cardiac Risk Index (RCRI) score is 0 (0.4 % risk) and Trammell score is 0.3 % risk. Patient is at low risk of  adverse perioperative cardiac events undergoing intermediate risk surgery. No further cardiac testing is needed prior to patient's surgery.\par \par 2. HTN: Patient's BP has been in the normal range on prior visits. No LVH on echo. \par -Continue to monitor, if needed can start ACEi or CCB in future\par \par 3. HLD: Patient's 10-year risk of atherosclerotic cardiovascular disease (ASCVD) as estimated by the pooled cohort equations is 12.5%.\par -Consider initiation of statin, can be done after surgery\par \par 4. Smoking: Patient counseled on smoking cessation strategies; discussed poor wound healing, etc with continued smoking. \par

## 2018-11-14 NOTE — HISTORY OF PRESENT ILLNESS
[FreeTextEntry1] : 57 yo M with back pain in the setting of spinal stenosis who presents for evaluation prior to planned back surgery as well as colonoscopy/EGD. 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 Patient reports he is able to do all this without any chest pain, dyspnea, palpitations, lightheadedness, or syncope\par

## 2018-11-16 ENCOUNTER — OUTPATIENT (OUTPATIENT)
Dept: OUTPATIENT SERVICES | Facility: HOSPITAL | Age: 58
LOS: 1 days | End: 2018-11-16
Payer: COMMERCIAL

## 2018-11-16 ENCOUNTER — APPOINTMENT (OUTPATIENT)
Dept: INTERNAL MEDICINE | Facility: HOSPITAL | Age: 58
End: 2018-11-16
Payer: COMMERCIAL

## 2018-11-16 ENCOUNTER — RESULT REVIEW (OUTPATIENT)
Age: 58
End: 2018-11-16

## 2018-11-16 DIAGNOSIS — Z12.11 ENCOUNTER FOR SCREENING FOR MALIGNANT NEOPLASM OF COLON: ICD-10-CM

## 2018-11-16 PROCEDURE — 88305 TISSUE EXAM BY PATHOLOGIST: CPT | Mod: 26

## 2018-11-16 PROCEDURE — 45380 COLONOSCOPY AND BIOPSY: CPT

## 2018-11-16 PROCEDURE — 45385 COLONOSCOPY W/LESION REMOVAL: CPT

## 2018-11-16 PROCEDURE — ZZZZZ: CPT

## 2018-11-16 PROCEDURE — 88305 TISSUE EXAM BY PATHOLOGIST: CPT

## 2018-11-19 ENCOUNTER — CHART COPY (OUTPATIENT)
Age: 58
End: 2018-11-19

## 2018-11-20 ENCOUNTER — APPOINTMENT (OUTPATIENT)
Dept: PULMONOLOGY | Facility: CLINIC | Age: 58
End: 2018-11-20
Payer: COMMERCIAL

## 2018-11-20 VITALS
OXYGEN SATURATION: 96 % | WEIGHT: 148 LBS | HEART RATE: 55 BPM | SYSTOLIC BLOOD PRESSURE: 134 MMHG | HEIGHT: 68 IN | DIASTOLIC BLOOD PRESSURE: 82 MMHG | BODY MASS INDEX: 22.43 KG/M2 | TEMPERATURE: 98.3 F

## 2018-11-20 PROCEDURE — 94729 DIFFUSING CAPACITY: CPT

## 2018-11-20 PROCEDURE — 99203 OFFICE O/P NEW LOW 30 MIN: CPT | Mod: 25

## 2018-11-20 PROCEDURE — ZZZZZ: CPT

## 2018-11-20 PROCEDURE — 94010 BREATHING CAPACITY TEST: CPT

## 2018-11-20 PROCEDURE — 94726 PLETHYSMOGRAPHY LUNG VOLUMES: CPT

## 2018-11-20 PROCEDURE — 99406 BEHAV CHNG SMOKING 3-10 MIN: CPT

## 2018-11-26 ENCOUNTER — APPOINTMENT (OUTPATIENT)
Dept: ORTHOPEDIC SURGERY | Facility: CLINIC | Age: 58
End: 2018-11-26
Payer: COMMERCIAL

## 2018-11-26 PROCEDURE — 99214 OFFICE O/P EST MOD 30 MIN: CPT

## 2018-11-29 ENCOUNTER — OUTPATIENT (OUTPATIENT)
Dept: OUTPATIENT SERVICES | Facility: HOSPITAL | Age: 58
LOS: 1 days | End: 2018-11-29

## 2018-11-29 VITALS
HEART RATE: 51 BPM | WEIGHT: 154.1 LBS | HEIGHT: 67.75 IN | DIASTOLIC BLOOD PRESSURE: 54 MMHG | SYSTOLIC BLOOD PRESSURE: 118 MMHG | TEMPERATURE: 97 F | OXYGEN SATURATION: 97 % | RESPIRATION RATE: 18 BRPM

## 2018-11-29 DIAGNOSIS — M48.02 SPINAL STENOSIS, CERVICAL REGION: ICD-10-CM

## 2018-11-29 DIAGNOSIS — S99.929A UNSPECIFIED INJURY OF UNSPECIFIED FOOT, INITIAL ENCOUNTER: Chronic | ICD-10-CM

## 2018-11-29 LAB
BLD GP AB SCN SERPL QL: NEGATIVE — SIGNIFICANT CHANGE UP
RH IG SCN BLD-IMP: POSITIVE — SIGNIFICANT CHANGE UP

## 2018-11-29 NOTE — H&P PST ADULT - RS GEN PE MLT RESP DETAILS PC
breath sounds equal/no wheezes/airway patent/clear to auscultation bilaterally/good air movement/respirations non-labored

## 2018-11-29 NOTE — H&P PST ADULT - HISTORY OF PRESENT ILLNESS
59 yo male presents to PST unit with diagnosis of spinal stenosis scheduled for C3-T2 laminectomy and fusion T9L1 laminectomy on 12/20/2018. He reports tightness, numbness and tingling of bilateral lower extremities.

## 2018-11-29 NOTE — H&P PST ADULT - NSANTHOSAYNRD_GEN_A_CORE
No. ROMAN screening performed.  STOP BANG Legend: 0-2 = LOW Risk; 3-4 = INTERMEDIATE Risk; 5-8 = HIGH Risk

## 2018-11-30 LAB — SPECIMEN SOURCE: SIGNIFICANT CHANGE UP

## 2018-12-01 LAB — BACTERIA NPH CULT: SIGNIFICANT CHANGE UP

## 2018-12-02 ENCOUNTER — TRANSCRIPTION ENCOUNTER (OUTPATIENT)
Age: 58
End: 2018-12-02

## 2018-12-06 ENCOUNTER — APPOINTMENT (OUTPATIENT)
Dept: INTERNAL MEDICINE | Facility: CLINIC | Age: 58
End: 2018-12-06
Payer: COMMERCIAL

## 2018-12-06 VITALS
HEART RATE: 88 BPM | OXYGEN SATURATION: 98 % | WEIGHT: 158 LBS | DIASTOLIC BLOOD PRESSURE: 77 MMHG | HEIGHT: 68 IN | BODY MASS INDEX: 23.95 KG/M2 | TEMPERATURE: 98.5 F | SYSTOLIC BLOOD PRESSURE: 166 MMHG

## 2018-12-06 PROCEDURE — 99215 OFFICE O/P EST HI 40 MIN: CPT

## 2018-12-06 RX ORDER — POLYETHYLENE GLYCOL 3350 17 G/17G
17 POWDER, FOR SOLUTION ORAL
Qty: 1 | Refills: 0 | Status: COMPLETED | COMMUNITY
Start: 2018-11-08 | End: 2018-12-06

## 2018-12-06 RX ORDER — GABAPENTIN 300 MG/1
300 CAPSULE ORAL
Qty: 180 | Refills: 2 | Status: DISCONTINUED | COMMUNITY
Start: 2018-10-24 | End: 2018-12-06

## 2018-12-06 RX ORDER — BISACODYL 5 MG/1
5 TABLET, COATED ORAL
Qty: 4 | Refills: 0 | Status: COMPLETED | COMMUNITY
Start: 2018-11-08 | End: 2018-12-06

## 2018-12-06 RX ORDER — PREDNISONE 20 MG/1
20 TABLET ORAL DAILY
Qty: 18 | Refills: 0 | Status: DISCONTINUED | COMMUNITY
Start: 2018-11-26 | End: 2018-12-06

## 2018-12-07 LAB
ALBUMIN SERPL ELPH-MCNC: 5.1 G/DL
ALP BLD-CCNC: 114 U/L
ALT SERPL-CCNC: 26 U/L
ANION GAP SERPL CALC-SCNC: 15 MMOL/L
APPEARANCE: CLEAR
APTT BLD: 28.9 SEC
AST SERPL-CCNC: 22 U/L
BACTERIA: NEGATIVE
BASOPHILS # BLD AUTO: 0.01 K/UL
BASOPHILS NFR BLD AUTO: 0.1 %
BILIRUB SERPL-MCNC: 0.4 MG/DL
BILIRUBIN URINE: NEGATIVE
BLOOD URINE: NEGATIVE
BUN SERPL-MCNC: 11 MG/DL
CALCIUM SERPL-MCNC: 10.3 MG/DL
CHLORIDE SERPL-SCNC: 101 MMOL/L
CO2 SERPL-SCNC: 24 MMOL/L
COLOR: YELLOW
CREAT SERPL-MCNC: 0.97 MG/DL
EOSINOPHIL # BLD AUTO: 0.01 K/UL
EOSINOPHIL NFR BLD AUTO: 0.1 %
FRUCTOSAMINE SERPL-MCNC: 271 UMOL/L
GLUCOSE QUALITATIVE U: NEGATIVE MG/DL
GLUCOSE SERPL-MCNC: 122 MG/DL
HBA1C MFR BLD HPLC: 6 %
HCT VFR BLD CALC: 48.9 %
HGB BLD-MCNC: 16.7 G/DL
IMM GRANULOCYTES NFR BLD AUTO: 0.6 %
INR PPP: 1.02 RATIO
KETONES URINE: ABNORMAL
LEUKOCYTE ESTERASE URINE: NEGATIVE
LYMPHOCYTES # BLD AUTO: 2.12 K/UL
LYMPHOCYTES NFR BLD AUTO: 25.7 %
MAN DIFF?: NORMAL
MCHC RBC-ENTMCNC: 32.7 PG
MCHC RBC-ENTMCNC: 34.2 GM/DL
MCV RBC AUTO: 95.9 FL
MICROSCOPIC-UA: NORMAL
MONOCYTES # BLD AUTO: 0.47 K/UL
MONOCYTES NFR BLD AUTO: 5.7 %
NEUTROPHILS # BLD AUTO: 5.6 K/UL
NEUTROPHILS NFR BLD AUTO: 67.8 %
NITRITE URINE: NEGATIVE
PH URINE: 5.5
PLATELET # BLD AUTO: 227 K/UL
POTASSIUM SERPL-SCNC: 4.1 MMOL/L
PROT SERPL-MCNC: 7.4 G/DL
PROTEIN URINE: NEGATIVE MG/DL
PT BLD: 11.6 SEC
RBC # BLD: 5.1 M/UL
RBC # FLD: 13.8 %
RED BLOOD CELLS URINE: 2 /HPF
SODIUM SERPL-SCNC: 140 MMOL/L
SPECIFIC GRAVITY URINE: 1.02
SQUAMOUS EPITHELIAL CELLS: 0 /HPF
UROBILINOGEN URINE: 1 MG/DL
WBC # FLD AUTO: 8.26 K/UL
WHITE BLOOD CELLS URINE: 1 /HPF

## 2018-12-07 NOTE — HISTORY OF PRESENT ILLNESS
[No Pertinent Cardiac History] : no history of aortic stenosis, atrial fibrillation, coronary artery disease, recent myocardial infarction, or implantable device/pacemaker [Smoker] : smoker [No Adverse Anesthesia Reaction] : no adverse anesthesia reaction in self or family member [(Patient denies any chest pain, claudication, dyspnea on exertion, orthopnea, palpitations or syncope)] : Patient denies any chest pain, claudication, dyspnea on exertion, orthopnea, palpitations or syncope [Moderate (4-6 METs)] : Moderate (4-6 METs) [Asthma] : no asthma [COPD] : no COPD [Sleep Apnea] : no sleep apnea [Family Member] : no family member with adverse anesthesia reaction/sudden death [Self] : no previous adverse anesthesia reaction [Chronic Anticoagulation] : no chronic anticoagulation [Chronic Kidney Disease] : no chronic kidney disease [Diabetes] : no diabetes [FreeTextEntry1] : C3T2 laminectomy Fusion T9-L1 laminectomy [FreeTextEntry2] : 12/20/2018 [FreeTextEntry3] : Dr Campo [FreeTextEntry4] : Pt is a 58 yr old man who has mri revealing spinal cord stenosis and cord compression. he scheduled for Dec 20,2018 .  He is having difficulty walking .he has constant numbness in his feet, and feels as if he has bandage around his legs.  he has weakness in his upper extremities and well and lower extremities.  he has cervical spine stenosis with myelomalacia and thoracic  spine  stenosis with myelomalacia and left paracentral disc herniation distally and foraminal stenosis  right L45.  Recently he was placed on prednisone 20 mg 9 day moisés and has stopped short of three days since he developed hiccups and foaming bile  taste in his mouth.  Since stopping he no longer has bile taste but still has hiccups.  It wakes him up.   [FreeTextEntry7] : pulmonary consultation =-  he had Normal pft.  he has stopped smoking for 10 days using patch.

## 2018-12-07 NOTE — PLAN
[FreeTextEntry1] : Patient will continue to not smoke but will remove the patch 24 hrs prior to the surgery\par Patient developed hiccups and gastritis from prednisone, will start him on a PPI, pantoprazole 20mg daily\par patient has refused flu vaccine

## 2018-12-07 NOTE — ASSESSMENT
[High Risk Surgery - Intraperitoneal, Intrathoracic or Supringuinal Vascular Procedures] : High Risk Surgery - Intraperitoneal, Intrathoracic or Supringuinal Vascular Procedures - Yes (1) [Ischemic Heart Disease] : Ischemic Heart Disease - No (0) [Congestive Heart Failure] : Congestive Heart Failure - No (0) [Prior Cerebrovascular Accident or TIA] : Prior Cerebrovascular Accident or TIA - No (0) [Creatinine >= 2mg/dL (1 Point)] : Creatinine >= 2mg/dL - No (0) [Insulin-dependent Diabetic (1 Point)] : Insulin-dependent Diabetic - No (0) [1] : 1 , RCRI Class: II, Risk of Post-Op Cardiac Complications: 0.9%, Procedure Risk: Low-Risk [As per surgery] : as per surgery [FreeTextEntry4] : Pre Op- patient was explained by the surgeon, the surgery, anesthesia, complications, risk and alternatives and answered all his questions. He is having a high risk procedure and has a 0.9% risk of having stephanie operative cardiac event. His ASA is 2 and He will have blood tests done today and if normal he will be medically cleared for surgery. [FreeTextEntry7] : no medications. but will remove the nicotine pctch 24 hrs prior to surgery

## 2018-12-07 NOTE — PHYSICAL EXAM
[No Acute Distress] : no acute distress [Well-Appearing] : well-appearing [Well Developed] : well developed [Well Nourished] : well nourished [Normal Sclera/Conjunctiva] : normal sclera/conjunctiva [Conjunctiva] : the conjunctiva were normal in both eyes [PERRL] : pupils were equal in size, round, and reactive to light [EOM Intact] : extraocular movements were intact [Normal Outer Ear/Nose] : the outer ears and nose were normal in appearance [Normal Oropharynx] : the oropharynx was normal [No JVD] : no jugular venous distention [Supple] : supple [No Lymphadenopathy] : no lymphadenopathy [Normal Appearance] : was normal in appearance [Neck Supple] : was supple [No Respiratory Distress] : no respiratory distress  [Clear to Auscultation] : lungs were clear to auscultation bilaterally [No Accessory Muscle Use] : no accessory muscle use [Rate ___] : at [unfilled] breaths per minute [Normal Rhythm/Effort] : normal respiratory rhythm and effort [Clear Bilaterally] : the lungs were clear to auscultation bilaterally [Normal to Percussion] : the lungs were normal to percussion [Regular Rhythm] : with a regular rhythm [Normal S1, S2] : normal S1 and S2 [5th Left ICS - MCL] : palpated at the 5th LICS in the midclavicular line [Normal Rate] : normal [Heart Rate ___] : [unfilled] bpm [Rhythm Regular] : regular [Normal S1] : normal S1 [Normal S2] : normal S2 [No Murmur] : no murmurs heard [No Pitting Edema] : no pitting edema present [No Abnormalities] : the abdominal aorta was not enlarged and no bruit was heard [No Carotid Bruits] : no carotid bruits [No Abdominal Bruit] : a ~M bruit was not heard ~T in the abdomen [No Varicosities] : no varicosities [Pedal Pulses Present] : the pedal pulses are present [No Edema] : there was no peripheral edema [No Extremity Clubbing/Cyanosis] : no extremity clubbing/cyanosis [No Palpable Aorta] : no palpable aorta [Nonpitting Edema] : nonpitting edema present [2+] : left 2+ [Soft] : abdomen soft [Soft, Nontender] : the abdomen was soft and nontender [No Mass] : no masses were palpated [No HSM] : no hepatosplenomegaly noted [Normal Kyphosis] : normal kyphosis [No Visual Abnormalities] : no visible abnormalities [Normal Lordosis] : normal lordosis [No Scoliosis] : no scoliosis [No Tenderness to Palpation] : no spine tenderness on palpation [No Masses] : no masses [Full ROM] : full ROM [No Pain with ROM] : no pain with motion in any direction [Ataxic] : ataxic [Motor Strength Upper Extremities Bilaterally] : there was weakness in both upper extremities [None] : there was no hypertonicity observed [Involuntary Movements] : no involuntary movements were seen [Complexion Dark] : dark complexion [Tattoo - Single] : a single tattoo was observed [Normal] : the cranial nerves were intact [Proprioception] : proprioception was  normal [Limited Balance] : the patient's balance was impaired [4+] : left 4+ [Normal Mental Status] : the patient's orientation, memory, attention, language and fund of knowledge were normal [Appropriate] : appropriate [S3] : no S3 [S4] : no S4 [Rt] : no varicose veins of the right leg [Lt] : no varicose veins of the left leg [Right Carotid Bruit] : no bruit heard over the right carotid [Left Carotid Bruit] : no bruit heard over the left carotid [Right Femoral Bruit] : no bruit heard over the right femoral artery [Left Femoral Bruit] : no bruit heard over the left femoral artery [Bruit] : no bruit heard [Motor Strength Lower Extremities Bilaterally] : strength was normal in both lower extremities [Abnormal Color] : normal color and pigmentation [Skin Lesions 1] : no skin lesions were observed [Skin Turgor Decreased] : normal skin turgor [Dysesthesia] : no dysesthesia [Hyperesthesia] : no hyperesthesia [Plantar Reflex Right Only] : absent on the right [Plantar Reflex Left Only] : absent on the left [___] : absent on the right [___] : absent on the left [Impaired judgment] : intact judgment [Impaired Insight] : intact insight

## 2018-12-07 NOTE — RESULTS/DATA
[ECG Reviewed] : reviewed [Ventricular Rate___] : ventricular rate is [unfilled] beats per minute [P Waves Normal] : the P wave is normal [ECG Intervals SD.] : SD interval is normal [AL Interval___] : [unfilled] seconds [Normal QRS] : the QRS is normal [QRS Interval___] : QRS interval: [unfilled] seconds [ECG Axis] : the QRS axis is normal [QTc Interval___] : QTc interval: [unfilled] [de-identified] : Patient was cleared by cardiologist and pulmonary for his surgery

## 2018-12-17 ENCOUNTER — APPOINTMENT (OUTPATIENT)
Dept: ORTHOPEDIC SURGERY | Facility: CLINIC | Age: 58
End: 2018-12-17
Payer: COMMERCIAL

## 2018-12-17 PROCEDURE — 99214 OFFICE O/P EST MOD 30 MIN: CPT

## 2018-12-17 PROCEDURE — 72082 X-RAY EXAM ENTIRE SPI 2/3 VW: CPT

## 2018-12-19 ENCOUNTER — TRANSCRIPTION ENCOUNTER (OUTPATIENT)
Age: 58
End: 2018-12-19

## 2018-12-20 ENCOUNTER — RESULT REVIEW (OUTPATIENT)
Age: 58
End: 2018-12-20

## 2018-12-20 ENCOUNTER — INPATIENT (INPATIENT)
Facility: HOSPITAL | Age: 58
LOS: 6 days | Discharge: NURSING, REHAB OR LONG-TERM CARE | End: 2018-12-27
Attending: STUDENT IN AN ORGANIZED HEALTH CARE EDUCATION/TRAINING PROGRAM | Admitting: STUDENT IN AN ORGANIZED HEALTH CARE EDUCATION/TRAINING PROGRAM
Payer: COMMERCIAL

## 2018-12-20 ENCOUNTER — APPOINTMENT (OUTPATIENT)
Dept: ORTHOPEDIC SURGERY | Facility: HOSPITAL | Age: 58
End: 2018-12-20
Payer: COMMERCIAL

## 2018-12-20 VITALS
HEIGHT: 67.75 IN | WEIGHT: 154.1 LBS | OXYGEN SATURATION: 95 % | DIASTOLIC BLOOD PRESSURE: 72 MMHG | TEMPERATURE: 98 F | HEART RATE: 57 BPM | SYSTOLIC BLOOD PRESSURE: 123 MMHG | RESPIRATION RATE: 16 BRPM

## 2018-12-20 DIAGNOSIS — S99.929A UNSPECIFIED INJURY OF UNSPECIFIED FOOT, INITIAL ENCOUNTER: Chronic | ICD-10-CM

## 2018-12-20 DIAGNOSIS — M48.02 SPINAL STENOSIS, CERVICAL REGION: ICD-10-CM

## 2018-12-20 LAB
BASOPHILS # BLD AUTO: 0.01 K/UL — SIGNIFICANT CHANGE UP (ref 0–0.2)
BASOPHILS NFR BLD AUTO: 0.1 % — SIGNIFICANT CHANGE UP (ref 0–2)
BLD GP AB SCN SERPL QL: NEGATIVE — SIGNIFICANT CHANGE UP
BUN SERPL-MCNC: 8 MG/DL — SIGNIFICANT CHANGE UP (ref 7–23)
CALCIUM SERPL-MCNC: 9.2 MG/DL — SIGNIFICANT CHANGE UP (ref 8.4–10.5)
CHLORIDE SERPL-SCNC: 106 MMOL/L — SIGNIFICANT CHANGE UP (ref 98–107)
CO2 SERPL-SCNC: 23 MMOL/L — SIGNIFICANT CHANGE UP (ref 22–31)
CREAT SERPL-MCNC: 0.98 MG/DL — SIGNIFICANT CHANGE UP (ref 0.5–1.3)
EOSINOPHIL # BLD AUTO: 0 K/UL — SIGNIFICANT CHANGE UP (ref 0–0.5)
EOSINOPHIL NFR BLD AUTO: 0 % — SIGNIFICANT CHANGE UP (ref 0–6)
GLUCOSE SERPL-MCNC: 171 MG/DL — HIGH (ref 70–99)
HCT VFR BLD CALC: 40.2 % — SIGNIFICANT CHANGE UP (ref 39–50)
HGB BLD-MCNC: 13.2 G/DL — SIGNIFICANT CHANGE UP (ref 13–17)
IMM GRANULOCYTES # BLD AUTO: 0.02 # — SIGNIFICANT CHANGE UP
IMM GRANULOCYTES NFR BLD AUTO: 0.2 % — SIGNIFICANT CHANGE UP (ref 0–1.5)
LYMPHOCYTES # BLD AUTO: 0.58 K/UL — LOW (ref 1–3.3)
LYMPHOCYTES # BLD AUTO: 6.9 % — LOW (ref 13–44)
MCHC RBC-ENTMCNC: 32 PG — SIGNIFICANT CHANGE UP (ref 27–34)
MCHC RBC-ENTMCNC: 32.8 % — SIGNIFICANT CHANGE UP (ref 32–36)
MCV RBC AUTO: 97.6 FL — SIGNIFICANT CHANGE UP (ref 80–100)
MONOCYTES # BLD AUTO: 0.09 K/UL — SIGNIFICANT CHANGE UP (ref 0–0.9)
MONOCYTES NFR BLD AUTO: 1.1 % — LOW (ref 2–14)
NEUTROPHILS # BLD AUTO: 7.7 K/UL — HIGH (ref 1.8–7.4)
NEUTROPHILS NFR BLD AUTO: 91.7 % — HIGH (ref 43–77)
NRBC # FLD: 0 — SIGNIFICANT CHANGE UP
PLATELET # BLD AUTO: 178 K/UL — SIGNIFICANT CHANGE UP (ref 150–400)
PMV BLD: 10.9 FL — SIGNIFICANT CHANGE UP (ref 7–13)
POTASSIUM SERPL-MCNC: 4.5 MMOL/L — SIGNIFICANT CHANGE UP (ref 3.5–5.3)
POTASSIUM SERPL-SCNC: 4.5 MMOL/L — SIGNIFICANT CHANGE UP (ref 3.5–5.3)
RBC # BLD: 4.12 M/UL — LOW (ref 4.2–5.8)
RBC # FLD: 13.5 % — SIGNIFICANT CHANGE UP (ref 10.3–14.5)
RH IG SCN BLD-IMP: POSITIVE — SIGNIFICANT CHANGE UP
SODIUM SERPL-SCNC: 142 MMOL/L — SIGNIFICANT CHANGE UP (ref 135–145)
WBC # BLD: 8.4 K/UL — SIGNIFICANT CHANGE UP (ref 3.8–10.5)
WBC # FLD AUTO: 8.4 K/UL — SIGNIFICANT CHANGE UP (ref 3.8–10.5)

## 2018-12-20 PROCEDURE — 63046 LAM FACETEC & FORAMOT THRC: CPT | Mod: 59

## 2018-12-20 PROCEDURE — 63266 EXCISE INTRSPINL LESION THRC: CPT

## 2018-12-20 PROCEDURE — 63048 LAM FACETEC &FORAMOT EA ADDL: CPT | Mod: 82

## 2018-12-20 PROCEDURE — 22614 ARTHRD PST TQ 1NTRSPC EA ADD: CPT

## 2018-12-20 PROCEDURE — 63266 EXCISE INTRSPINL LESION THRC: CPT | Mod: 82

## 2018-12-20 PROCEDURE — 22842 INSERT SPINE FIXATION DEVICE: CPT

## 2018-12-20 PROCEDURE — 88304 TISSUE EXAM BY PATHOLOGIST: CPT | Mod: 26

## 2018-12-20 PROCEDURE — 63045 LAM FACETEC & FORAMOT CRV: CPT | Mod: 82

## 2018-12-20 PROCEDURE — 63046 LAM FACETEC & FORAMOT THRC: CPT | Mod: 82,59

## 2018-12-20 PROCEDURE — 22842 INSERT SPINE FIXATION DEVICE: CPT | Mod: 82

## 2018-12-20 PROCEDURE — 22614 ARTHRD PST TQ 1NTRSPC EA ADD: CPT | Mod: 82

## 2018-12-20 PROCEDURE — 22600 ARTHRD PST TQ 1NTRSPC CRV: CPT | Mod: 82

## 2018-12-20 PROCEDURE — 22600 ARTHRD PST TQ 1NTRSPC CRV: CPT

## 2018-12-20 PROCEDURE — 63048 LAM FACETEC &FORAMOT EA ADDL: CPT

## 2018-12-20 PROCEDURE — 63045 LAM FACETEC & FORAMOT CRV: CPT

## 2018-12-20 PROCEDURE — 88311 DECALCIFY TISSUE: CPT | Mod: 26

## 2018-12-20 RX ORDER — HYDROMORPHONE HYDROCHLORIDE 2 MG/ML
0.5 INJECTION INTRAMUSCULAR; INTRAVENOUS; SUBCUTANEOUS
Qty: 0 | Refills: 0 | Status: DISCONTINUED | OUTPATIENT
Start: 2018-12-20 | End: 2018-12-22

## 2018-12-20 RX ORDER — SODIUM CHLORIDE 9 MG/ML
1000 INJECTION, SOLUTION INTRAVENOUS
Qty: 0 | Refills: 0 | Status: DISCONTINUED | OUTPATIENT
Start: 2018-12-20 | End: 2018-12-20

## 2018-12-20 RX ORDER — HYDROMORPHONE HYDROCHLORIDE 2 MG/ML
30 INJECTION INTRAMUSCULAR; INTRAVENOUS; SUBCUTANEOUS
Qty: 0 | Refills: 0 | Status: DISCONTINUED | OUTPATIENT
Start: 2018-12-20 | End: 2018-12-22

## 2018-12-20 RX ORDER — MAGNESIUM HYDROXIDE 400 MG/1
30 TABLET, CHEWABLE ORAL EVERY 12 HOURS
Qty: 0 | Refills: 0 | Status: DISCONTINUED | OUTPATIENT
Start: 2018-12-20 | End: 2018-12-27

## 2018-12-20 RX ORDER — ONDANSETRON 8 MG/1
4 TABLET, FILM COATED ORAL ONCE
Qty: 0 | Refills: 0 | Status: DISCONTINUED | OUTPATIENT
Start: 2018-12-20 | End: 2018-12-20

## 2018-12-20 RX ORDER — BUTORPHANOL TARTRATE 2 MG/ML
0.12 INJECTION, SOLUTION INTRAMUSCULAR; INTRAVENOUS EVERY 6 HOURS
Qty: 0 | Refills: 0 | Status: DISCONTINUED | OUTPATIENT
Start: 2018-12-20 | End: 2018-12-22

## 2018-12-20 RX ORDER — HYDROMORPHONE HYDROCHLORIDE 2 MG/ML
0.5 INJECTION INTRAMUSCULAR; INTRAVENOUS; SUBCUTANEOUS
Qty: 0 | Refills: 0 | Status: DISCONTINUED | OUTPATIENT
Start: 2018-12-20 | End: 2018-12-20

## 2018-12-20 RX ORDER — CYCLOBENZAPRINE HYDROCHLORIDE 10 MG/1
10 TABLET, FILM COATED ORAL EVERY 8 HOURS
Qty: 0 | Refills: 0 | Status: DISCONTINUED | OUTPATIENT
Start: 2018-12-20 | End: 2018-12-24

## 2018-12-20 RX ORDER — CEFAZOLIN SODIUM 1 G
2000 VIAL (EA) INJECTION EVERY 8 HOURS
Qty: 0 | Refills: 0 | Status: COMPLETED | OUTPATIENT
Start: 2018-12-21 | End: 2018-12-21

## 2018-12-20 RX ORDER — SENNA PLUS 8.6 MG/1
2 TABLET ORAL AT BEDTIME
Qty: 0 | Refills: 0 | Status: DISCONTINUED | OUTPATIENT
Start: 2018-12-20 | End: 2018-12-27

## 2018-12-20 RX ORDER — DIAZEPAM 5 MG
5 TABLET ORAL EVERY 12 HOURS
Qty: 0 | Refills: 0 | Status: DISCONTINUED | OUTPATIENT
Start: 2018-12-20 | End: 2018-12-27

## 2018-12-20 RX ORDER — CHOLECALCIFEROL (VITAMIN D3) 125 MCG
1 CAPSULE ORAL
Qty: 0 | Refills: 0 | COMMUNITY

## 2018-12-20 RX ORDER — ONDANSETRON 8 MG/1
4 TABLET, FILM COATED ORAL EVERY 6 HOURS
Qty: 0 | Refills: 0 | Status: DISCONTINUED | OUTPATIENT
Start: 2018-12-20 | End: 2018-12-22

## 2018-12-20 RX ORDER — GABAPENTIN 400 MG/1
1 CAPSULE ORAL
Qty: 0 | Refills: 0 | COMMUNITY

## 2018-12-20 RX ORDER — NALOXONE HYDROCHLORIDE 4 MG/.1ML
0.1 SPRAY NASAL
Qty: 0 | Refills: 0 | Status: DISCONTINUED | OUTPATIENT
Start: 2018-12-20 | End: 2018-12-22

## 2018-12-20 RX ORDER — HYDROMORPHONE HYDROCHLORIDE 2 MG/ML
1 INJECTION INTRAMUSCULAR; INTRAVENOUS; SUBCUTANEOUS
Qty: 0 | Refills: 0 | Status: DISCONTINUED | OUTPATIENT
Start: 2018-12-20 | End: 2018-12-20

## 2018-12-20 RX ORDER — SODIUM CHLORIDE 9 MG/ML
1000 INJECTION, SOLUTION INTRAVENOUS
Qty: 0 | Refills: 0 | Status: DISCONTINUED | OUTPATIENT
Start: 2018-12-20 | End: 2018-12-27

## 2018-12-20 RX ORDER — OXYCODONE HYDROCHLORIDE 5 MG/1
10 TABLET ORAL ONCE
Qty: 0 | Refills: 0 | Status: DISCONTINUED | OUTPATIENT
Start: 2018-12-20 | End: 2018-12-20

## 2018-12-20 RX ADMIN — HYDROMORPHONE HYDROCHLORIDE 1 MILLIGRAM(S): 2 INJECTION INTRAMUSCULAR; INTRAVENOUS; SUBCUTANEOUS at 18:45

## 2018-12-20 RX ADMIN — Medication 100 MILLIGRAM(S): at 23:46

## 2018-12-20 RX ADMIN — SENNA PLUS 2 TABLET(S): 8.6 TABLET ORAL at 23:47

## 2018-12-20 RX ADMIN — HYDROMORPHONE HYDROCHLORIDE 30 MILLILITER(S): 2 INJECTION INTRAMUSCULAR; INTRAVENOUS; SUBCUTANEOUS at 19:10

## 2018-12-20 RX ADMIN — HYDROMORPHONE HYDROCHLORIDE 1 MILLIGRAM(S): 2 INJECTION INTRAMUSCULAR; INTRAVENOUS; SUBCUTANEOUS at 18:24

## 2018-12-20 RX ADMIN — HYDROMORPHONE HYDROCHLORIDE 30 MILLILITER(S): 2 INJECTION INTRAMUSCULAR; INTRAVENOUS; SUBCUTANEOUS at 23:52

## 2018-12-20 RX ADMIN — SODIUM CHLORIDE 125 MILLILITER(S): 9 INJECTION, SOLUTION INTRAVENOUS at 23:46

## 2018-12-20 RX ADMIN — SODIUM CHLORIDE 125 MILLILITER(S): 9 INJECTION, SOLUTION INTRAVENOUS at 18:25

## 2018-12-20 RX ADMIN — CYCLOBENZAPRINE HYDROCHLORIDE 10 MILLIGRAM(S): 10 TABLET, FILM COATED ORAL at 23:46

## 2018-12-20 RX ADMIN — SODIUM CHLORIDE 30 MILLILITER(S): 9 INJECTION, SOLUTION INTRAVENOUS at 09:19

## 2018-12-20 NOTE — BRIEF OPERATIVE NOTE - PROCEDURE
<<-----Click on this checkbox to enter Procedure Laminectomy  12/20/2018    Active  FPAULINO  Cervical fusion  12/20/2018    Active  [email or web address]

## 2018-12-21 DIAGNOSIS — M48.00 SPINAL STENOSIS, SITE UNSPECIFIED: ICD-10-CM

## 2018-12-21 DIAGNOSIS — D50.0 IRON DEFICIENCY ANEMIA SECONDARY TO BLOOD LOSS (CHRONIC): ICD-10-CM

## 2018-12-21 LAB
BASOPHILS # BLD AUTO: 0 K/UL — SIGNIFICANT CHANGE UP (ref 0–0.2)
BASOPHILS NFR BLD AUTO: 0 % — SIGNIFICANT CHANGE UP (ref 0–2)
BUN SERPL-MCNC: 13 MG/DL — SIGNIFICANT CHANGE UP (ref 7–23)
CALCIUM SERPL-MCNC: 8.9 MG/DL — SIGNIFICANT CHANGE UP (ref 8.4–10.5)
CHLORIDE SERPL-SCNC: 102 MMOL/L — SIGNIFICANT CHANGE UP (ref 98–107)
CO2 SERPL-SCNC: 24 MMOL/L — SIGNIFICANT CHANGE UP (ref 22–31)
CREAT SERPL-MCNC: 0.94 MG/DL — SIGNIFICANT CHANGE UP (ref 0.5–1.3)
EOSINOPHIL # BLD AUTO: 0 K/UL — SIGNIFICANT CHANGE UP (ref 0–0.5)
EOSINOPHIL NFR BLD AUTO: 0 % — SIGNIFICANT CHANGE UP (ref 0–6)
GLUCOSE SERPL-MCNC: 157 MG/DL — HIGH (ref 70–99)
HCT VFR BLD CALC: 32.7 % — LOW (ref 39–50)
HGB BLD-MCNC: 11.1 G/DL — LOW (ref 13–17)
IMM GRANULOCYTES # BLD AUTO: 0.04 # — SIGNIFICANT CHANGE UP
IMM GRANULOCYTES NFR BLD AUTO: 0.4 % — SIGNIFICANT CHANGE UP (ref 0–1.5)
LYMPHOCYTES # BLD AUTO: 0.85 K/UL — LOW (ref 1–3.3)
LYMPHOCYTES # BLD AUTO: 9.4 % — LOW (ref 13–44)
MCHC RBC-ENTMCNC: 32.6 PG — SIGNIFICANT CHANGE UP (ref 27–34)
MCHC RBC-ENTMCNC: 33.9 % — SIGNIFICANT CHANGE UP (ref 32–36)
MCV RBC AUTO: 96.2 FL — SIGNIFICANT CHANGE UP (ref 80–100)
MONOCYTES # BLD AUTO: 0.71 K/UL — SIGNIFICANT CHANGE UP (ref 0–0.9)
MONOCYTES NFR BLD AUTO: 7.8 % — SIGNIFICANT CHANGE UP (ref 2–14)
NEUTROPHILS # BLD AUTO: 7.45 K/UL — HIGH (ref 1.8–7.4)
NEUTROPHILS NFR BLD AUTO: 82.4 % — HIGH (ref 43–77)
NRBC # FLD: 0 — SIGNIFICANT CHANGE UP
PLATELET # BLD AUTO: 158 K/UL — SIGNIFICANT CHANGE UP (ref 150–400)
PMV BLD: 10.6 FL — SIGNIFICANT CHANGE UP (ref 7–13)
POTASSIUM SERPL-MCNC: 4.3 MMOL/L — SIGNIFICANT CHANGE UP (ref 3.5–5.3)
POTASSIUM SERPL-SCNC: 4.3 MMOL/L — SIGNIFICANT CHANGE UP (ref 3.5–5.3)
RBC # BLD: 3.4 M/UL — LOW (ref 4.2–5.8)
RBC # FLD: 13.3 % — SIGNIFICANT CHANGE UP (ref 10.3–14.5)
SODIUM SERPL-SCNC: 138 MMOL/L — SIGNIFICANT CHANGE UP (ref 135–145)
WBC # BLD: 9.05 K/UL — SIGNIFICANT CHANGE UP (ref 3.8–10.5)
WBC # FLD AUTO: 9.05 K/UL — SIGNIFICANT CHANGE UP (ref 3.8–10.5)

## 2018-12-21 PROCEDURE — 99223 1ST HOSP IP/OBS HIGH 75: CPT

## 2018-12-21 PROCEDURE — 93010 ELECTROCARDIOGRAM REPORT: CPT

## 2018-12-21 RX ADMIN — SODIUM CHLORIDE 125 MILLILITER(S): 9 INJECTION, SOLUTION INTRAVENOUS at 05:49

## 2018-12-21 RX ADMIN — CYCLOBENZAPRINE HYDROCHLORIDE 10 MILLIGRAM(S): 10 TABLET, FILM COATED ORAL at 13:01

## 2018-12-21 RX ADMIN — HYDROMORPHONE HYDROCHLORIDE 30 MILLILITER(S): 2 INJECTION INTRAMUSCULAR; INTRAVENOUS; SUBCUTANEOUS at 20:04

## 2018-12-21 RX ADMIN — HYDROMORPHONE HYDROCHLORIDE 30 MILLILITER(S): 2 INJECTION INTRAMUSCULAR; INTRAVENOUS; SUBCUTANEOUS at 17:35

## 2018-12-21 RX ADMIN — HYDROMORPHONE HYDROCHLORIDE 30 MILLILITER(S): 2 INJECTION INTRAMUSCULAR; INTRAVENOUS; SUBCUTANEOUS at 08:14

## 2018-12-21 RX ADMIN — CYCLOBENZAPRINE HYDROCHLORIDE 10 MILLIGRAM(S): 10 TABLET, FILM COATED ORAL at 21:34

## 2018-12-21 RX ADMIN — CYCLOBENZAPRINE HYDROCHLORIDE 10 MILLIGRAM(S): 10 TABLET, FILM COATED ORAL at 05:50

## 2018-12-21 RX ADMIN — SENNA PLUS 2 TABLET(S): 8.6 TABLET ORAL at 21:34

## 2018-12-21 RX ADMIN — Medication 100 MILLIGRAM(S): at 08:02

## 2018-12-21 NOTE — OCCUPATIONAL THERAPY INITIAL EVALUATION ADULT - LIVES WITH, PROFILE
Pt lives in an apartment with no steps to manage. Pt has a bathtub within bathroom with no current DME./spouse

## 2018-12-21 NOTE — OCCUPATIONAL THERAPY INITIAL EVALUATION ADULT - PERTINENT HX OF CURRENT PROBLEM, REHAB EVAL
59 yo male with diagnosis of spinal stenosis who reported tightness, numbness and tingling of bilateral lower extremities. Pt now s/p  C3-T2 laminectomy and fusion T9L1 laminectomy.

## 2018-12-21 NOTE — CONSULT NOTE ADULT - SUBJECTIVE AND OBJECTIVE BOX
Patient is a 58y old  Male who presents with a chief complaint of " I have spinal stenosis" (29 Nov 2018 11:57)      HPI:  59 yo male presents to PST unit with diagnosis of spinal stenosis scheduled for C3-T2 laminectomy and fusion T9L1 laminectomy on 12/20/2018. He reports tightness, numbness and tingling of bilateral lower extremities. (29 Nov 2018 11:57)  patient underwent C4-T1 fusion, T9-12 laminectomy on 12/20.  Patient complaining about the surgical site pain, achy in nature, 10/10 in severity, worse with movement, improved with dilaudid PCA.  Complicated by post-op anemia but denies F/C, N/V, CP, SOB, Cough, lightheadedness, dizziness, abdominal pain, diarrhea, dysuria.    Pain Symptoms if applicable:             	                         none	   mild         moderate         severe  Pain:	            10                0	    1-3	     4-6	         7-10  Location:	Surgical site  Modifying factors:	Worse with movement  Associated symptoms:	    Allergies    No Known Allergies    Intolerances        HOME MEDICATIONS: Reviewed    MEDICATIONS  (STANDING):  cyclobenzaprine 10 milliGRAM(s) Oral every 8 hours  HYDROmorphone PCA (1 mG/mL) 30 milliLiter(s) PCA Continuous PCA Continuous  lactated ringers. 1000 milliLiter(s) (125 mL/Hr) IV Continuous <Continuous>  senna 2 Tablet(s) Oral at bedtime    MEDICATIONS  (PRN):  butorphanol Injectable 0.125 milliGRAM(s) IV Push every 6 hours PRN Pruritus  diazepam    Tablet 5 milliGRAM(s) Oral every 12 hours PRN Anxiety  HYDROmorphone PCA (1 mG/mL) Rescue Clinician Bolus 0.5 milliGRAM(s) IV Push every 15 minutes PRN for Pain Scale GREATER THAN 6  magnesium hydroxide Suspension 30 milliLiter(s) Oral every 12 hours PRN Constipation  naloxone Injectable 0.1 milliGRAM(s) IV Push every 3 minutes PRN For ANY of the following changes in patient status:  A. RR LESS THAN 10 breaths per minute, B. Oxygen saturation LESS THAN 90%, C. Sedation score of 6  ondansetron Injectable 4 milliGRAM(s) IV Push every 6 hours PRN Nausea      PAST MEDICAL & SURGICAL HISTORY:  Vitamin D deficiency  Spinal stenosis  Injury, foot: right - 2 years ago      SOCIAL HISTORY:  No tobacco/alcohol use/abuse.    FAMILY HISTORY:  No pertinent family history in first degree relatives      REVIEW OF SYSTEMS:    CONSTITUTIONAL: No fever, weight loss, or fatigue  EYES: No eye pain, visual disturbances, or discharge  ENMT:  No difficulty hearing, tinnitus, vertigo; No sinus or throat pain  NECK: Neck pain with movement.  BREASTS: No pain, masses, or nipple discharge  RESPIRATORY: No cough, wheezing, chills or hemoptysis; No shortness of breath  CARDIOVASCULAR: No chest pain, palpitations, dizziness, or leg swelling  GASTROINTESTINAL: No abdominal or epigastric pain. No nausea, vomiting, or hematemesis; No diarrhea or constipation. No melena or hematochezia.  GENITOURINARY: No dysuria, frequency, hematuria, or incontinence  NEUROLOGICAL: No headaches, memory loss, loss of strength, tremors. LE numbness improved, but still present.  SKIN: No itching, burning, rashes, or lesions   LYMPH NODES: No enlarged glands  ENDOCRINE: No heat or cold intolerance; No hair loss  MUSCULOSKELETAL: No muscle or back pain  PSYCHIATRIC: No depression, anxiety, mood swings, or difficulty sleeping  HEME/LYMPH: No easy bruising, or bleeding gums  ALLERGY AND IMMUNOLOGIC: No hives or eczema      Vital Signs Last 24 Hrs  T(C): 37.4 (21 Dec 2018 13:02), Max: 37.5 (20 Dec 2018 20:00)  T(F): 99.4 (21 Dec 2018 13:02), Max: 99.5 (20 Dec 2018 20:00)  HR: 88 (21 Dec 2018 13:02) (80 - 116)  BP: 150/86 (21 Dec 2018 13:02) (128/98 - 162/106)  BP(mean): 103 (20 Dec 2018 21:30) (102 - 117)  RR: 18 (21 Dec 2018 13:02) (14 - 24)  SpO2: 98% (21 Dec 2018 13:02) (95% - 100%)  CAPILLARY BLOOD GLUCOSE          PHYSICAL EXAM:    GENERAL: NAD, well-groomed, well-developed  HEAD:  Atraumatic, Normocephalic  EYES: EOMI, PERRLA, conjunctiva and sclera clear  ENMT: Moist mucous membranes  NECK: Neck brace in place.  Drain in place.  RESPIRATORY: Clear to auscultation bilaterally; No rales, rhonchi, wheezing, or rubs  CARDIOVASCULAR: Regular rate and rhythm; No murmurs, rubs, or gallops  GASTROINTESTINAL: Soft, Nontender, Nondistended; Bowel sounds present  BACK: No tenderness  GENITOURINARY: Not examined  EXTREMITIES:  2+ Peripheral Pulses, No clubbing, cyanosis, or edema  NERVOUS SYSTEM:  Alert & Oriented X3; Moving all 4 extremities; No gross sensory deficits  PSYCH: Calm  HEME/LYMPH: No lymphadenopathy noted  SKIN: No rashes or lesions; Incisions C/D/I    LABS:                        11.1   9.05  )-----------( 158      ( 21 Dec 2018 06:05 )             32.7     12-21    138  |  102  |  13  ----------------------------<  157<H>  4.3   |  24  |  0.94    Ca    8.9      21 Dec 2018 06:05          CAPILLARY BLOOD GLUCOSE          RADIOLOGY & ADDITIONAL STUDIES:    Imaging:   Personally Reviewed:  [ ] YES               EKG:   Personally Reviewed:  [ ] YES       Care Discussed with Consultant(s)/Other Providers:  Care Discussed with Primary Team.      [] Increased delirium risk  [] Delirium and other risks can be reduced by:          -early ambulation          -minimizing "tethers" - IV, oxygen, catheters, etc          -avoiding hypnotics and sedatives          -maintaining hydration/nutrition          -avoid anticholinergics - diphenhydramine, etc          -pain control          -supportive environment

## 2018-12-22 DIAGNOSIS — R50.82 POSTPROCEDURAL FEVER: ICD-10-CM

## 2018-12-22 LAB
BASOPHILS # BLD AUTO: 0 K/UL — SIGNIFICANT CHANGE UP (ref 0–0.2)
BASOPHILS NFR BLD AUTO: 0 % — SIGNIFICANT CHANGE UP (ref 0–2)
BUN SERPL-MCNC: 11 MG/DL — SIGNIFICANT CHANGE UP (ref 7–23)
CALCIUM SERPL-MCNC: 8.6 MG/DL — SIGNIFICANT CHANGE UP (ref 8.4–10.5)
CHLORIDE SERPL-SCNC: 99 MMOL/L — SIGNIFICANT CHANGE UP (ref 98–107)
CO2 SERPL-SCNC: 27 MMOL/L — SIGNIFICANT CHANGE UP (ref 22–31)
CREAT SERPL-MCNC: 0.85 MG/DL — SIGNIFICANT CHANGE UP (ref 0.5–1.3)
EOSINOPHIL # BLD AUTO: 0.01 K/UL — SIGNIFICANT CHANGE UP (ref 0–0.5)
EOSINOPHIL NFR BLD AUTO: 0.1 % — SIGNIFICANT CHANGE UP (ref 0–6)
GLUCOSE SERPL-MCNC: 160 MG/DL — HIGH (ref 70–99)
HCT VFR BLD CALC: 27.5 % — LOW (ref 39–50)
HCT VFR BLD CALC: 27.5 % — LOW (ref 39–50)
HGB BLD-MCNC: 9.3 G/DL — LOW (ref 13–17)
HGB BLD-MCNC: 9.3 G/DL — LOW (ref 13–17)
IMM GRANULOCYTES # BLD AUTO: 0.04 # — SIGNIFICANT CHANGE UP
IMM GRANULOCYTES NFR BLD AUTO: 0.4 % — SIGNIFICANT CHANGE UP (ref 0–1.5)
LYMPHOCYTES # BLD AUTO: 1.38 K/UL — SIGNIFICANT CHANGE UP (ref 1–3.3)
LYMPHOCYTES # BLD AUTO: 15.1 % — SIGNIFICANT CHANGE UP (ref 13–44)
MCHC RBC-ENTMCNC: 32.1 PG — SIGNIFICANT CHANGE UP (ref 27–34)
MCHC RBC-ENTMCNC: 32.1 PG — SIGNIFICANT CHANGE UP (ref 27–34)
MCHC RBC-ENTMCNC: 33.8 % — SIGNIFICANT CHANGE UP (ref 32–36)
MCHC RBC-ENTMCNC: 33.8 % — SIGNIFICANT CHANGE UP (ref 32–36)
MCV RBC AUTO: 94.8 FL — SIGNIFICANT CHANGE UP (ref 80–100)
MCV RBC AUTO: 94.8 FL — SIGNIFICANT CHANGE UP (ref 80–100)
MONOCYTES # BLD AUTO: 1.12 K/UL — HIGH (ref 0–0.9)
MONOCYTES NFR BLD AUTO: 12.2 % — SIGNIFICANT CHANGE UP (ref 2–14)
NEUTROPHILS # BLD AUTO: 6.61 K/UL — SIGNIFICANT CHANGE UP (ref 1.8–7.4)
NEUTROPHILS NFR BLD AUTO: 72.2 % — SIGNIFICANT CHANGE UP (ref 43–77)
NRBC # FLD: 0 — SIGNIFICANT CHANGE UP
NRBC # FLD: 0 — SIGNIFICANT CHANGE UP
PLATELET # BLD AUTO: 123 K/UL — LOW (ref 150–400)
PLATELET # BLD AUTO: 123 K/UL — LOW (ref 150–400)
PMV BLD: 11.3 FL — SIGNIFICANT CHANGE UP (ref 7–13)
PMV BLD: 11.3 FL — SIGNIFICANT CHANGE UP (ref 7–13)
POTASSIUM SERPL-MCNC: 3.8 MMOL/L — SIGNIFICANT CHANGE UP (ref 3.5–5.3)
POTASSIUM SERPL-SCNC: 3.8 MMOL/L — SIGNIFICANT CHANGE UP (ref 3.5–5.3)
RBC # BLD: 2.9 M/UL — LOW (ref 4.2–5.8)
RBC # BLD: 2.9 M/UL — LOW (ref 4.2–5.8)
RBC # FLD: 13.4 % — SIGNIFICANT CHANGE UP (ref 10.3–14.5)
RBC # FLD: 13.4 % — SIGNIFICANT CHANGE UP (ref 10.3–14.5)
SODIUM SERPL-SCNC: 136 MMOL/L — SIGNIFICANT CHANGE UP (ref 135–145)
WBC # BLD: 9.16 K/UL — SIGNIFICANT CHANGE UP (ref 3.8–10.5)
WBC # BLD: 9.16 K/UL — SIGNIFICANT CHANGE UP (ref 3.8–10.5)
WBC # FLD AUTO: 9.16 K/UL — SIGNIFICANT CHANGE UP (ref 3.8–10.5)
WBC # FLD AUTO: 9.16 K/UL — SIGNIFICANT CHANGE UP (ref 3.8–10.5)

## 2018-12-22 PROCEDURE — 99233 SBSQ HOSP IP/OBS HIGH 50: CPT

## 2018-12-22 RX ORDER — OXYCODONE HYDROCHLORIDE 5 MG/1
5 TABLET ORAL
Qty: 0 | Refills: 0 | Status: DISCONTINUED | OUTPATIENT
Start: 2018-12-22 | End: 2018-12-24

## 2018-12-22 RX ORDER — OXYCODONE HYDROCHLORIDE 5 MG/1
10 TABLET ORAL
Qty: 0 | Refills: 0 | Status: DISCONTINUED | OUTPATIENT
Start: 2018-12-22 | End: 2018-12-24

## 2018-12-22 RX ORDER — ACETAMINOPHEN 500 MG
650 TABLET ORAL EVERY 6 HOURS
Qty: 0 | Refills: 0 | Status: DISCONTINUED | OUTPATIENT
Start: 2018-12-22 | End: 2018-12-27

## 2018-12-22 RX ORDER — HYDROMORPHONE HYDROCHLORIDE 2 MG/ML
0.5 INJECTION INTRAMUSCULAR; INTRAVENOUS; SUBCUTANEOUS
Qty: 0 | Refills: 0 | Status: DISCONTINUED | OUTPATIENT
Start: 2018-12-22 | End: 2018-12-23

## 2018-12-22 RX ADMIN — Medication 5 MILLIGRAM(S): at 15:23

## 2018-12-22 RX ADMIN — HYDROMORPHONE HYDROCHLORIDE 30 MILLILITER(S): 2 INJECTION INTRAMUSCULAR; INTRAVENOUS; SUBCUTANEOUS at 08:15

## 2018-12-22 RX ADMIN — OXYCODONE HYDROCHLORIDE 10 MILLIGRAM(S): 5 TABLET ORAL at 12:57

## 2018-12-22 RX ADMIN — SENNA PLUS 2 TABLET(S): 8.6 TABLET ORAL at 21:26

## 2018-12-22 RX ADMIN — CYCLOBENZAPRINE HYDROCHLORIDE 10 MILLIGRAM(S): 10 TABLET, FILM COATED ORAL at 14:32

## 2018-12-22 RX ADMIN — CYCLOBENZAPRINE HYDROCHLORIDE 10 MILLIGRAM(S): 10 TABLET, FILM COATED ORAL at 21:26

## 2018-12-22 RX ADMIN — Medication 650 MILLIGRAM(S): at 01:38

## 2018-12-22 RX ADMIN — CYCLOBENZAPRINE HYDROCHLORIDE 10 MILLIGRAM(S): 10 TABLET, FILM COATED ORAL at 05:08

## 2018-12-22 RX ADMIN — Medication 650 MILLIGRAM(S): at 02:08

## 2018-12-22 NOTE — PROGRESS NOTE ADULT - PROBLEM SELECTOR PLAN 3
Febrile overnight, pt nontoxic appearance, hemodynamically stable  likely reactive   no sign of active infection

## 2018-12-23 LAB
BASOPHILS # BLD AUTO: 0.01 K/UL — SIGNIFICANT CHANGE UP (ref 0–0.2)
BASOPHILS NFR BLD AUTO: 0.1 % — SIGNIFICANT CHANGE UP (ref 0–2)
BUN SERPL-MCNC: 9 MG/DL — SIGNIFICANT CHANGE UP (ref 7–23)
CALCIUM SERPL-MCNC: 9 MG/DL — SIGNIFICANT CHANGE UP (ref 8.4–10.5)
CHLORIDE SERPL-SCNC: 101 MMOL/L — SIGNIFICANT CHANGE UP (ref 98–107)
CO2 SERPL-SCNC: 25 MMOL/L — SIGNIFICANT CHANGE UP (ref 22–31)
CREAT SERPL-MCNC: 0.83 MG/DL — SIGNIFICANT CHANGE UP (ref 0.5–1.3)
EOSINOPHIL # BLD AUTO: 0.04 K/UL — SIGNIFICANT CHANGE UP (ref 0–0.5)
EOSINOPHIL NFR BLD AUTO: 0.5 % — SIGNIFICANT CHANGE UP (ref 0–6)
GLUCOSE SERPL-MCNC: 126 MG/DL — HIGH (ref 70–99)
HCT VFR BLD CALC: 28.8 % — LOW (ref 39–50)
HGB BLD-MCNC: 9.5 G/DL — LOW (ref 13–17)
IMM GRANULOCYTES # BLD AUTO: 0.05 # — SIGNIFICANT CHANGE UP
IMM GRANULOCYTES NFR BLD AUTO: 0.6 % — SIGNIFICANT CHANGE UP (ref 0–1.5)
LYMPHOCYTES # BLD AUTO: 1.49 K/UL — SIGNIFICANT CHANGE UP (ref 1–3.3)
LYMPHOCYTES # BLD AUTO: 17.6 % — SIGNIFICANT CHANGE UP (ref 13–44)
MCHC RBC-ENTMCNC: 32.3 PG — SIGNIFICANT CHANGE UP (ref 27–34)
MCHC RBC-ENTMCNC: 33 % — SIGNIFICANT CHANGE UP (ref 32–36)
MCV RBC AUTO: 98 FL — SIGNIFICANT CHANGE UP (ref 80–100)
MONOCYTES # BLD AUTO: 0.91 K/UL — HIGH (ref 0–0.9)
MONOCYTES NFR BLD AUTO: 10.7 % — SIGNIFICANT CHANGE UP (ref 2–14)
NEUTROPHILS # BLD AUTO: 5.99 K/UL — SIGNIFICANT CHANGE UP (ref 1.8–7.4)
NEUTROPHILS NFR BLD AUTO: 70.5 % — SIGNIFICANT CHANGE UP (ref 43–77)
NRBC # FLD: 0 — SIGNIFICANT CHANGE UP
PLATELET # BLD AUTO: 142 K/UL — LOW (ref 150–400)
PMV BLD: 11.1 FL — SIGNIFICANT CHANGE UP (ref 7–13)
POTASSIUM SERPL-MCNC: 3.6 MMOL/L — SIGNIFICANT CHANGE UP (ref 3.5–5.3)
POTASSIUM SERPL-SCNC: 3.6 MMOL/L — SIGNIFICANT CHANGE UP (ref 3.5–5.3)
RBC # BLD: 2.94 M/UL — LOW (ref 4.2–5.8)
RBC # FLD: 13 % — SIGNIFICANT CHANGE UP (ref 10.3–14.5)
SODIUM SERPL-SCNC: 137 MMOL/L — SIGNIFICANT CHANGE UP (ref 135–145)
WBC # BLD: 8.49 K/UL — SIGNIFICANT CHANGE UP (ref 3.8–10.5)
WBC # FLD AUTO: 8.49 K/UL — SIGNIFICANT CHANGE UP (ref 3.8–10.5)

## 2018-12-23 PROCEDURE — 99232 SBSQ HOSP IP/OBS MODERATE 35: CPT

## 2018-12-23 RX ADMIN — CYCLOBENZAPRINE HYDROCHLORIDE 10 MILLIGRAM(S): 10 TABLET, FILM COATED ORAL at 14:02

## 2018-12-23 RX ADMIN — OXYCODONE HYDROCHLORIDE 10 MILLIGRAM(S): 5 TABLET ORAL at 20:25

## 2018-12-23 RX ADMIN — OXYCODONE HYDROCHLORIDE 10 MILLIGRAM(S): 5 TABLET ORAL at 14:02

## 2018-12-23 RX ADMIN — SENNA PLUS 2 TABLET(S): 8.6 TABLET ORAL at 21:53

## 2018-12-23 RX ADMIN — OXYCODONE HYDROCHLORIDE 10 MILLIGRAM(S): 5 TABLET ORAL at 18:00

## 2018-12-23 RX ADMIN — OXYCODONE HYDROCHLORIDE 10 MILLIGRAM(S): 5 TABLET ORAL at 21:10

## 2018-12-23 RX ADMIN — CYCLOBENZAPRINE HYDROCHLORIDE 10 MILLIGRAM(S): 10 TABLET, FILM COATED ORAL at 05:07

## 2018-12-23 RX ADMIN — Medication 5 MILLIGRAM(S): at 10:03

## 2018-12-23 RX ADMIN — CYCLOBENZAPRINE HYDROCHLORIDE 10 MILLIGRAM(S): 10 TABLET, FILM COATED ORAL at 21:53

## 2018-12-23 RX ADMIN — OXYCODONE HYDROCHLORIDE 10 MILLIGRAM(S): 5 TABLET ORAL at 14:45

## 2018-12-23 RX ADMIN — OXYCODONE HYDROCHLORIDE 10 MILLIGRAM(S): 5 TABLET ORAL at 17:15

## 2018-12-23 NOTE — PROGRESS NOTE ADULT - PROBLEM SELECTOR PLAN 3
low grade fever, pt nontoxic appearance, hemodynamically stable, no clinical symptoms or signs suggesting active infection.   likely reactive   monitor fever curve

## 2018-12-24 ENCOUNTER — TRANSCRIPTION ENCOUNTER (OUTPATIENT)
Age: 58
End: 2018-12-24

## 2018-12-24 PROCEDURE — 99232 SBSQ HOSP IP/OBS MODERATE 35: CPT

## 2018-12-24 PROCEDURE — 99222 1ST HOSP IP/OBS MODERATE 55: CPT | Mod: GC

## 2018-12-24 RX ORDER — DEXAMETHASONE 0.5 MG/5ML
10 ELIXIR ORAL EVERY 6 HOURS
Qty: 0 | Refills: 0 | Status: COMPLETED | OUTPATIENT
Start: 2018-12-24 | End: 2018-12-25

## 2018-12-24 RX ORDER — OXYCODONE HYDROCHLORIDE 5 MG/1
5 TABLET ORAL
Qty: 0 | Refills: 0 | Status: DISCONTINUED | OUTPATIENT
Start: 2018-12-24 | End: 2018-12-27

## 2018-12-24 RX ORDER — OXYCODONE HYDROCHLORIDE 5 MG/1
1 TABLET ORAL
Qty: 0 | Refills: 0 | COMMUNITY
Start: 2018-12-24

## 2018-12-24 RX ORDER — OXYCODONE HYDROCHLORIDE 5 MG/1
5 TABLET ORAL
Qty: 0 | Refills: 0 | Status: DISCONTINUED | OUTPATIENT
Start: 2018-12-24 | End: 2018-12-24

## 2018-12-24 RX ORDER — DOCUSATE SODIUM 100 MG
100 CAPSULE ORAL THREE TIMES A DAY
Qty: 0 | Refills: 0 | Status: DISCONTINUED | OUTPATIENT
Start: 2018-12-24 | End: 2018-12-27

## 2018-12-24 RX ORDER — ACETAMINOPHEN 500 MG
2 TABLET ORAL
Qty: 0 | Refills: 0 | COMMUNITY
Start: 2018-12-24

## 2018-12-24 RX ORDER — DIAZEPAM 5 MG
1 TABLET ORAL
Qty: 0 | Refills: 0 | COMMUNITY
Start: 2018-12-24

## 2018-12-24 RX ORDER — OXYCODONE HYDROCHLORIDE 5 MG/1
10 TABLET ORAL
Qty: 0 | Refills: 0 | Status: DISCONTINUED | OUTPATIENT
Start: 2018-12-24 | End: 2018-12-27

## 2018-12-24 RX ORDER — PANTOPRAZOLE SODIUM 20 MG/1
40 TABLET, DELAYED RELEASE ORAL
Qty: 0 | Refills: 0 | Status: DISCONTINUED | OUTPATIENT
Start: 2018-12-24 | End: 2018-12-27

## 2018-12-24 RX ORDER — SENNA PLUS 8.6 MG/1
2 TABLET ORAL
Qty: 0 | Refills: 0 | COMMUNITY
Start: 2018-12-24

## 2018-12-24 RX ADMIN — CYCLOBENZAPRINE HYDROCHLORIDE 10 MILLIGRAM(S): 10 TABLET, FILM COATED ORAL at 14:42

## 2018-12-24 RX ADMIN — SENNA PLUS 2 TABLET(S): 8.6 TABLET ORAL at 21:27

## 2018-12-24 RX ADMIN — Medication 5 MILLIGRAM(S): at 23:48

## 2018-12-24 RX ADMIN — OXYCODONE HYDROCHLORIDE 10 MILLIGRAM(S): 5 TABLET ORAL at 22:12

## 2018-12-24 RX ADMIN — OXYCODONE HYDROCHLORIDE 10 MILLIGRAM(S): 5 TABLET ORAL at 10:23

## 2018-12-24 RX ADMIN — Medication 102 MILLIGRAM(S): at 23:48

## 2018-12-24 RX ADMIN — CYCLOBENZAPRINE HYDROCHLORIDE 10 MILLIGRAM(S): 10 TABLET, FILM COATED ORAL at 05:29

## 2018-12-24 RX ADMIN — Medication 102 MILLIGRAM(S): at 17:46

## 2018-12-24 RX ADMIN — OXYCODONE HYDROCHLORIDE 10 MILLIGRAM(S): 5 TABLET ORAL at 11:00

## 2018-12-24 RX ADMIN — Medication 100 MILLIGRAM(S): at 21:27

## 2018-12-24 RX ADMIN — Medication 5 MILLIGRAM(S): at 05:29

## 2018-12-24 RX ADMIN — OXYCODONE HYDROCHLORIDE 10 MILLIGRAM(S): 5 TABLET ORAL at 02:29

## 2018-12-24 RX ADMIN — OXYCODONE HYDROCHLORIDE 10 MILLIGRAM(S): 5 TABLET ORAL at 15:30

## 2018-12-24 RX ADMIN — OXYCODONE HYDROCHLORIDE 10 MILLIGRAM(S): 5 TABLET ORAL at 01:44

## 2018-12-24 RX ADMIN — OXYCODONE HYDROCHLORIDE 10 MILLIGRAM(S): 5 TABLET ORAL at 21:27

## 2018-12-24 RX ADMIN — OXYCODONE HYDROCHLORIDE 10 MILLIGRAM(S): 5 TABLET ORAL at 14:42

## 2018-12-24 NOTE — DISCHARGE NOTE ADULT - NS AS DC FOLLOWUP STROKE INST
surgical spinal decomression, know your medication side effects, force surgical spinal decompression, know your medication side effects, force Influenza vaccination (VIS Pub Date: August 7, 2015)/Smoking Cessation/surgical spinal decompression, know your medication side effects, force

## 2018-12-24 NOTE — DISCHARGE NOTE ADULT - CARE PROVIDER_API CALL
[place]), Orthopaedic Surgery  [address] [place] [address]  Phone: [phone]  Fax: [phone]

## 2018-12-24 NOTE — DISCHARGE NOTE ADULT - NSTOBACCOPAMPHLET_GEN_A_NCS
"How to Quit Using Tobacco" Pamphlet given "Environment Tobacco Smoke" Pamphlet given/"How to Quit Using Tobacco" Pamphlet given

## 2018-12-24 NOTE — DISCHARGE NOTE ADULT - MEDICATION SUMMARY - MEDICATIONS TO STOP TAKING
I will STOP taking the medications listed below when I get home from the hospital:    predniSONE 20 mg oral tablet  -- 3 tabs daily x 3 days, 2 tabs daily x 3 days, 1 tab daily x 3 days

## 2018-12-24 NOTE — DISCHARGE NOTE ADULT - COMPLETE THE FOLLOWING IF THE PATIENT REFUSES THE INFLUENZA VACCINE:
Vaccine Information Sheet (VIS) provided-VIS date: 8/07/15 Risks/benefits discussed with patient/surrogate/Vaccine Information Sheet (VIS) provided-VIS date: 8/07/15

## 2018-12-24 NOTE — DISCHARGE NOTE ADULT - NS AS ACTIVITY OBS
Walking-Indoors allowed/Stairs allowed/No Heavy lifting/straining/Walking-Outdoors allowed/Do not make important decisions/Do not drive or operate machinery/Showering allowed

## 2018-12-24 NOTE — CONSULT NOTE ADULT - ASSESSMENT
1. PT- bed mobility,transfers, gait and balance training  2. OT- ADL'S  3. s/p decompression- continue pain meds, bowel regimen.   4. Patient would benefit from acute rehab, needs a multidisciplinary team including PT, OT . Can tolerate 3 hours of therapy a day.  Will follow.
58M with spinal stenosis s/p C4-T1 fusion, T9-12 laminectomy on 12/20 complicated by post-op anemia.

## 2018-12-24 NOTE — DISCHARGE NOTE ADULT - CONDITIONS AT DISCHARGE
Mid back upper with gauze and tape, bilateral temple pining site c/d/i.  +void.  Tolerated po and fluids.  VSS

## 2018-12-24 NOTE — DISCHARGE NOTE ADULT - PLAN OF CARE
pain control, gait training and stabilization exercises in acute rehab setting Mr. Brandon is a 59 y/o M status post C4-T1 Posterior Spinal Decompression Fusion,  T9-T11 Laminectomy secondary to cervical and thoracic stenosis with myelopathy.  The patient is recovering well from surgery.  Pain is well controlled with current PO regimen.  Motor strength and sensation is grossly intact.  Romberg is positive as compared to prior to surgery.  As such, the patient would benefit from acute rehabilitation services for extended gait training, stabilization exercises, amongst other functional modalities to optimize overall functional status. Mr. Brandon is a 59 y/o M status post C4-T1 Posterior Spinal Decompression Fusion,  T9-T11 Laminectomy secondary to cervical and thoracic stenosis with clinical presentations of myelopathy.  The patient is recovering well from surgery.  Pain is well controlled with current PO regimen.  Motor strength and sensation is grossly intact.  Romberg is positive and remains unchanged.  As such, the patient would benefit from acute rehabilitation services for extended gait training, stabilization exercises, amongst other functional modalities to optimize overall functional status.  Patient encouraged to wear cervical collar while ambulating. Mr. Brandon is a 57 y/o M status post C4-T1 Posterior Spinal Decompression Fusion,  T9-T11 Laminectomy secondary to cervical and thoracic stenosis with clinical presentations of myelopathy.  The patient is recovering well from surgery.  Pain is well controlled with current PO regimen.  Motor strength and sensation is grossly intact.  Romberg is positive and remains unchanged.  As such, the patient would benefit from acute rehabilitation services for extended gait training, stabilization exercises, amongst other functional modalities to optimize overall functional status.  Patient encouraged to wear cervical collar while ambulating.    VTE Prophylaxis- subcutaneous unfractionated heparin may be initiated at [facility] facility. Mr. Brandon is a 57 y/o M status post C4-T1 Posterior Spinal Decompression Fusion,  T9-T11 Laminectomy secondary to cervical and thoracic stenosis with clinical presentations of myelopathy.  The patient is recovering well from surgery.  Pain is well controlled with current PO regimen.  Motor strength and sensation is grossly intact.  Romberg is positive and remains unchanged.  As such, the patient would benefit from acute rehabilitation services for extended gait training, stabilization exercises, amongst other functional modalities to optimize overall functional status.  Patient encouraged to wear cervical collar while ambulating.    VTE Prophylaxis- subcutaneous unfractionated heparin may be initiated acute rehab facility.

## 2018-12-24 NOTE — DISCHARGE NOTE ADULT - CARE PLAN
Principal Discharge DX:	Spinal stenosis of cervicothoracic region  Goal:	pain control, gait training and stabilization exercises in acute rehab setting Principal Discharge DX:	Spinal stenosis of cervicothoracic region  Goal:	pain control, gait training and stabilization exercises in acute rehab setting  Assessment and plan of treatment:	Mr. Brandon is a 59 y/o M status post C4-T1 Posterior Spinal Decompression Fusion,  T9-T11 Laminectomy secondary to cervical and thoracic stenosis with myelopathy.  The patient is recovering well from surgery.  Pain is well controlled with current PO regimen.  Motor strength and sensation is grossly intact.  Romberg is positive as compared to prior to surgery.  As such, the patient would benefit from acute rehabilitation services for extended gait training, stabilization exercises, amongst other functional modalities to optimize overall functional status. Principal Discharge DX:	Spinal stenosis of cervicothoracic region  Goal:	pain control, gait training and stabilization exercises in acute rehab setting  Assessment and plan of treatment:	Mr. Brandon is a 59 y/o M status post C4-T1 Posterior Spinal Decompression Fusion,  T9-T11 Laminectomy secondary to cervical and thoracic stenosis with clinical presentations of myelopathy.  The patient is recovering well from surgery.  Pain is well controlled with current PO regimen.  Motor strength and sensation is grossly intact.  Romberg is positive and remains unchanged.  As such, the patient would benefit from acute rehabilitation services for extended gait training, stabilization exercises, amongst other functional modalities to optimize overall functional status.  Patient encouraged to wear cervical collar while ambulating. Principal Discharge DX:	Spinal stenosis of cervicothoracic region  Goal:	pain control, gait training and stabilization exercises in acute rehab setting  Assessment and plan of treatment:	Mr. Brandon is a 59 y/o M status post C4-T1 Posterior Spinal Decompression Fusion,  T9-T11 Laminectomy secondary to cervical and thoracic stenosis with clinical presentations of myelopathy.  The patient is recovering well from surgery.  Pain is well controlled with current PO regimen.  Motor strength and sensation is grossly intact.  Romberg is positive and remains unchanged.  As such, the patient would benefit from acute rehabilitation services for extended gait training, stabilization exercises, amongst other functional modalities to optimize overall functional status.  Patient encouraged to wear cervical collar while ambulating.    VTE Prophylaxis- subcutaneous unfractionated heparin may be initiated at [facility] facility. Principal Discharge DX:	Spinal stenosis of cervicothoracic region  Goal:	pain control, gait training and stabilization exercises in acute rehab setting  Assessment and plan of treatment:	Mr. Brandon is a 59 y/o M status post C4-T1 Posterior Spinal Decompression Fusion,  T9-T11 Laminectomy secondary to cervical and thoracic stenosis with clinical presentations of myelopathy.  The patient is recovering well from surgery.  Pain is well controlled with current PO regimen.  Motor strength and sensation is grossly intact.  Romberg is positive and remains unchanged.  As such, the patient would benefit from acute rehabilitation services for extended gait training, stabilization exercises, amongst other functional modalities to optimize overall functional status.  Patient encouraged to wear cervical collar while ambulating.    VTE Prophylaxis- subcutaneous unfractionated heparin may be initiated acute rehab facility.

## 2018-12-24 NOTE — CONSULT NOTE ADULT - SUBJECTIVE AND OBJECTIVE BOX
HPI:  59 yo male presents to PST unit with diagnosis of spinal stenosis scheduled for C3-T2 laminectomy and fusion T9L1 laminectomy on 12/20/2018. He reports tightness, numbness and tingling of bilateral lower extremities. (29 Nov 2018 11:57)      REVIEW OF SYSTEMS: No chest pain, shortness of breath, nausea, vomiting or diarhea.      PAST MEDICAL & SURGICAL HISTORY  Vitamin D deficiency  Spinal stenosis  Injury, foot      SOCIAL HISTORY  Smoking - Denied, EtOH - Denied, Drugs - Denied    FUNCTIONAL HISTORY:   Lives   Independent    CURRENT FUNCTIONAL STATUS:      FAMILY HISTORY   No pertinent family history in first degree relatives      RECENT LABS/IMAGING  CBC Full  -  ( 23 Dec 2018 05:56 )  WBC Count : 8.49 K/uL  Hemoglobin : 9.5 g/dL  Hematocrit : 28.8 %  Platelet Count - Automated : 142 K/uL  Mean Cell Volume : 98.0 fL  Mean Cell Hemoglobin : 32.3 pg  Mean Cell Hemoglobin Concentration : 33.0 %  Auto Neutrophil # : 5.99 K/uL  Auto Lymphocyte # : 1.49 K/uL  Auto Monocyte # : 0.91 K/uL  Auto Eosinophil # : 0.04 K/uL  Auto Basophil # : 0.01 K/uL  Auto Neutrophil % : 70.5 %  Auto Lymphocyte % : 17.6 %  Auto Monocyte % : 10.7 %  Auto Eosinophil % : 0.5 %  Auto Basophil % : 0.1 %    12-23    137  |  101  |  9   ----------------------------<  126<H>  3.6   |  25  |  0.83    Ca    9.0      23 Dec 2018 05:56          VITALS  T(C): 37 (12-24-18 @ 10:16), Max: 37.4 (12-23-18 @ 14:01)  HR: 93 (12-24-18 @ 10:16) (85 - 98)  BP: 117/70 (12-24-18 @ 10:16) (113/74 - 121/77)  RR: 18 (12-24-18 @ 10:16) (17 - 18)  SpO2: 100% (12-24-18 @ 10:16) (96% - 100%)  Wt(kg): --    ALLERGIES  No Known Allergies      MEDICATIONS   acetaminophen   Tablet .. 650 milliGRAM(s) Oral every 6 hours PRN  cyclobenzaprine 10 milliGRAM(s) Oral every 8 hours  diazepam    Tablet 5 milliGRAM(s) Oral every 12 hours PRN  lactated ringers. 1000 milliLiter(s) IV Continuous <Continuous>  magnesium hydroxide Suspension 30 milliLiter(s) Oral every 12 hours PRN  oxyCODONE    IR 5 milliGRAM(s) Oral every 3 hours PRN  oxyCODONE    IR 10 milliGRAM(s) Oral every 3 hours PRN  senna 2 Tablet(s) Oral at bedtime      ----------------------------------------------------------------------------------------  PHYSICAL EXAM  Constitutional - NAD, Comfortable  HEENT - NCAT, EOMI  Neck - Supple, No limited ROM  Chest - CTA bilaterally, No wheeze, No rhonchi, No crackles  Cardiovascular - RRR, S1S2, No murmurs  Abdomen - BS+, Soft, NTND  Extremities - No C/C/E, No calf tenderness   Neurologic Exam -                    Cognitive - Awake, Alert, AAO to self, place, date, year, situation     Communication - Fluent, No dysarthria, no aphasia     Cranial Nerves - CN 2-12 intact     Motor - No focal deficits                       Sensory - Intact to LT     Reflexes - DTR Intact, No primitive reflexive     Balance - WNL Static  Psychiatric - Mood stable, Affect WNL HPI:  57 yo male presents to PST unit with diagnosis of spinal stenosis scheduled for C3-T2 laminectomy and fusion T9L1 laminectomy on 12/20/2018. He reports tightness, numbness and tingling of bilateral lower extremities. (29 Nov 2018 11:57)      REVIEW OF SYSTEMS: No chest pain, shortness of breath, nausea, vomiting or diarhea.      PAST MEDICAL & SURGICAL HISTORY  Vitamin D deficiency  Spinal stenosis  Injury, foot      SOCIAL HISTORY  Smoking - Denied, EtOH - Denied, Drugs - Denied    FUNCTIONAL HISTORY:   Lives with family   Independent PTA     CURRENT FUNCTIONAL STATUS: min a       FAMILY HISTORY   No pertinent family history in first degree relatives      RECENT LABS/IMAGING  CBC Full  -  ( 23 Dec 2018 05:56 )  WBC Count : 8.49 K/uL  Hemoglobin : 9.5 g/dL  Hematocrit : 28.8 %  Platelet Count - Automated : 142 K/uL  Mean Cell Volume : 98.0 fL  Mean Cell Hemoglobin : 32.3 pg  Mean Cell Hemoglobin Concentration : 33.0 %  Auto Neutrophil # : 5.99 K/uL  Auto Lymphocyte # : 1.49 K/uL  Auto Monocyte # : 0.91 K/uL  Auto Eosinophil # : 0.04 K/uL  Auto Basophil # : 0.01 K/uL  Auto Neutrophil % : 70.5 %  Auto Lymphocyte % : 17.6 %  Auto Monocyte % : 10.7 %  Auto Eosinophil % : 0.5 %  Auto Basophil % : 0.1 %    12-23    137  |  101  |  9   ----------------------------<  126<H>  3.6   |  25  |  0.83    Ca    9.0      23 Dec 2018 05:56          VITALS  T(C): 37 (12-24-18 @ 10:16), Max: 37.4 (12-23-18 @ 14:01)  HR: 93 (12-24-18 @ 10:16) (85 - 98)  BP: 117/70 (12-24-18 @ 10:16) (113/74 - 121/77)  RR: 18 (12-24-18 @ 10:16) (17 - 18)  SpO2: 100% (12-24-18 @ 10:16) (96% - 100%)  Wt(kg): --    ALLERGIES  No Known Allergies      MEDICATIONS   acetaminophen   Tablet .. 650 milliGRAM(s) Oral every 6 hours PRN  cyclobenzaprine 10 milliGRAM(s) Oral every 8 hours  diazepam    Tablet 5 milliGRAM(s) Oral every 12 hours PRN  lactated ringers. 1000 milliLiter(s) IV Continuous <Continuous>  magnesium hydroxide Suspension 30 milliLiter(s) Oral every 12 hours PRN  oxyCODONE    IR 5 milliGRAM(s) Oral every 3 hours PRN  oxyCODONE    IR 10 milliGRAM(s) Oral every 3 hours PRN  senna 2 Tablet(s) Oral at bedtime      ----------------------------------------------------------------------------------------  PHYSICAL EXAM  Constitutional - NAD, Comfortable  HEENT - NCAT, EOMI  Neck - Supple, No limited ROM  Chest - CTA bilaterally, No wheeze, No rhonchi, No crackles  Cardiovascular - RRR, S1S2, No murmurs  Abdomen - BS+, Soft, NTND  Extremities - No C/C/E, No calf tenderness   Neurologic Exam -                    Cognitive - Awake, Alert, AAO to self, place, date, year, situation     Communication - Fluent, No dysarthria, no aphasia     Cranial Nerves - CN 2-12 intact     Motor - 4-/5 b/l LE                        Sensory - Intact to LT     Reflexes - DTR Intact, No primitive reflexive     Balance - WNL Static  Psychiatric - Mood stable, Affect WNL

## 2018-12-24 NOTE — DISCHARGE NOTE ADULT - INSTRUCTIONS
Call MD for any c/o numbness, tingling, swelling to all extremities, fever and a return appointment.  Take over the counter stool softener to prevent constipation. may resume regular diet

## 2018-12-24 NOTE — DISCHARGE NOTE ADULT - PATIENT PORTAL LINK FT
You can access the [facility] Portal, offered by [facility], by registering with the following website: [email or web address]

## 2018-12-24 NOTE — DISCHARGE NOTE ADULT - HOSPITAL COURSE
Mr. Brandon is a 57 y/o M status post C4-T1 Posterior Spinal Decompression Fusion,  T9-T11 Laminectomy secondary to cervical and thoracic stenosis with myelopathy.  The patient is recovering well from surgery.  Pain is well controlled with current PO regimen.  Motor strength and sensation is grossly intact.  Romberg is positive as compared to prior to surgery.  As such, the patient would benefit from acute rehabilitation services for extended gait training, stabilization exercises, amongst other functional modalities to optimize overall functional status. Mr. Brandon is a 59 y/o M status post C4-T1 Posterior Spinal Decompression Fusion,  T9-T11 Laminectomy secondary to cervical and thoracic stenosis with clinical presentations of myelopathy.  The patient is recovering well from surgery.  Pain is well controlled with current PO regimen.  Motor strength and sensation is grossly intact.  Romberg is positive and remains unchanged.  As such, the patient would benefit from acute rehabilitation services for extended gait training, stabilization exercises, amongst other functional modalities to optimize overall functional status.  Patient encouraged to wear cervical collar while ambulating. Mr. Brandon is a 57 y/o M status post C4-T1 Posterior Spinal Decompression Fusion,  T9-T11 Laminectomy secondary to cervical and thoracic stenosis with clinical presentations of myelopathy.  The patient is recovering well from surgery.  Pain is well controlled with current PO regimen.  Motor strength and sensation is grossly intact.  Romberg is positive and remains unchanged.  As such, the patient would benefit from acute rehabilitation services for extended gait training, stabilization exercises, amongst other functional modalities to optimize overall functional status.  Patient encouraged to wear cervical collar while ambulating.   VTE prophylaxis- subcutaneous unfractionated heparin may be initiated at [facility]. Mr. Brandon is a 57 y/o M status post C4-T1 Posterior Spinal Decompression Fusion,  T9-T11 Laminectomy secondary to cervical and thoracic stenosis with clinical presentations of myelopathy.  The patient is recovering well from surgery.  Pain is well controlled with current PO regimen.  Motor strength and sensation is grossly intact.  Romberg is positive and remains unchanged.  As such, the patient would benefit from acute rehabilitation services for extended gait training, stabilization exercises, amongst other functional modalities to optimize overall functional status.  Patient encouraged to wear cervical collar while ambulating.     VTE prophylaxis- subcutaneous unfractionated heparin may be initiated at [facility]. Mr. Brandon is a 59 y/o M status post C4-T1 Posterior Spinal Decompression Fusion,  T9-T11 Laminectomy secondary to cervical and thoracic stenosis with clinical presentations of myelopathy.  The patient is recovering well from surgery.  Pain is well controlled with current PO regimen.  Motor strength and sensation is grossly intact.  Romberg is positive and remains unchanged.  As such, the patient would benefit from acute rehabilitation services for extended gait training, stabilization exercises, amongst other functional modalities to optimize overall functional status.  Patient encouraged to wear cervical collar while ambulating.     VTE prophylaxis- subcutaneous unfractionated heparin may be initiated at acute rehab facility.

## 2018-12-24 NOTE — DISCHARGE NOTE ADULT - MEDICATION SUMMARY - MEDICATIONS TO TAKE
I will START or STAY ON the medications listed below when I get home from the hospital:    acetaminophen 325 mg oral tablet  -- 2 tab(s) by mouth every 6 hours, As needed, Temp greater or equal to 38C (100.4F)  -- Indication: For Pain and/or fever    oxyCODONE 5 mg oral tablet  -- 1-2 tab(s) by mouth every 3 hours, As needed for pain  -- Indication: For Pain    diazePAM 5 mg oral tablet  -- 1 tab(s) by mouth every 12 hours, As needed, spasm  -- Indication: For Spasm    gabapentin 300 mg oral capsule  -- 1 cap(s) by mouth 3 times a day  -- Indication: For Neuropathic Pain    senna oral tablet  -- 2 tab(s) by mouth once a day (at bedtime)  -- Indication: For Stool Softener    Vitamin D3 50,000 intl units oral capsule  -- 1 cap(s) by mouth once a month, stopped 2 weeks ago   -- Indication: For Vitamin I will START or STAY ON the medications listed below when I get home from the hospital:    acetaminophen 325 mg oral tablet  -- 2 tab(s) by mouth every 6 hours, As needed, Temp greater or equal to 38C (100.4F)  -- Indication: For Pain and/or fever    oxyCODONE 5 mg oral tablet  -- 1-2 tab(s) by mouth every 3 hours, As needed for pain  -- Indication: For Pain    diazePAM 5 mg oral tablet  -- 1 tab(s) by mouth every 12 hours, As needed, spasm  -- Indication: For Spasm    gabapentin 300 mg oral capsule  -- 1 cap(s) by mouth 3 times a day  -- Indication: For Neuropathic Pain    senna oral tablet  -- 2 tab(s) by mouth once a day (at bedtime)  -- Indication: For Stool Softener    docusate sodium 100 mg oral capsule  -- 1 cap(s) by mouth 3 times a day  -- Indication: For Stool softener    pantoprazole 40 mg oral delayed release tablet  -- 1 tab(s) by mouth once a day (before a meal)  -- Indication: For Stomach protectant    Nicoderm C-Q Clear 14 mg/24 hr transdermal film, extended release  -- 1 patch by transdermal patch once a day  -- Indication: For Smoking cessation    Vitamin D3 50,000 intl units oral capsule  -- 1 cap(s) by mouth once a month, stopped 2 weeks ago   -- Indication: For Vitamin

## 2018-12-25 LAB
BUN SERPL-MCNC: 17 MG/DL — SIGNIFICANT CHANGE UP (ref 7–23)
CALCIUM SERPL-MCNC: 9.4 MG/DL — SIGNIFICANT CHANGE UP (ref 8.4–10.5)
CHLORIDE SERPL-SCNC: 96 MMOL/L — LOW (ref 98–107)
CO2 SERPL-SCNC: 26 MMOL/L — SIGNIFICANT CHANGE UP (ref 22–31)
CREAT SERPL-MCNC: 0.72 MG/DL — SIGNIFICANT CHANGE UP (ref 0.5–1.3)
GLUCOSE SERPL-MCNC: 184 MG/DL — HIGH (ref 70–99)
HCT VFR BLD CALC: 27.5 % — LOW (ref 39–50)
HGB BLD-MCNC: 9.1 G/DL — LOW (ref 13–17)
MCHC RBC-ENTMCNC: 31.8 PG — SIGNIFICANT CHANGE UP (ref 27–34)
MCHC RBC-ENTMCNC: 33.1 % — SIGNIFICANT CHANGE UP (ref 32–36)
MCV RBC AUTO: 96.2 FL — SIGNIFICANT CHANGE UP (ref 80–100)
NRBC # FLD: 0 — SIGNIFICANT CHANGE UP
PLATELET # BLD AUTO: 196 K/UL — SIGNIFICANT CHANGE UP (ref 150–400)
PMV BLD: 10.5 FL — SIGNIFICANT CHANGE UP (ref 7–13)
POTASSIUM SERPL-MCNC: 4.3 MMOL/L — SIGNIFICANT CHANGE UP (ref 3.5–5.3)
POTASSIUM SERPL-SCNC: 4.3 MMOL/L — SIGNIFICANT CHANGE UP (ref 3.5–5.3)
RBC # BLD: 2.86 M/UL — LOW (ref 4.2–5.8)
RBC # FLD: 12.3 % — SIGNIFICANT CHANGE UP (ref 10.3–14.5)
SODIUM SERPL-SCNC: 135 MMOL/L — SIGNIFICANT CHANGE UP (ref 135–145)
WBC # BLD: 3.69 K/UL — LOW (ref 3.8–10.5)
WBC # FLD AUTO: 3.69 K/UL — LOW (ref 3.8–10.5)

## 2018-12-25 PROCEDURE — 99232 SBSQ HOSP IP/OBS MODERATE 35: CPT

## 2018-12-25 RX ORDER — NICOTINE POLACRILEX 2 MG
1 GUM BUCCAL DAILY
Qty: 0 | Refills: 0 | Status: DISCONTINUED | OUTPATIENT
Start: 2018-12-25 | End: 2018-12-27

## 2018-12-25 RX ADMIN — Medication 1 PATCH: at 18:10

## 2018-12-25 RX ADMIN — Medication 100 MILLIGRAM(S): at 05:48

## 2018-12-25 RX ADMIN — OXYCODONE HYDROCHLORIDE 10 MILLIGRAM(S): 5 TABLET ORAL at 10:15

## 2018-12-25 RX ADMIN — Medication 102 MILLIGRAM(S): at 05:48

## 2018-12-25 RX ADMIN — SENNA PLUS 2 TABLET(S): 8.6 TABLET ORAL at 22:00

## 2018-12-25 RX ADMIN — OXYCODONE HYDROCHLORIDE 10 MILLIGRAM(S): 5 TABLET ORAL at 22:54

## 2018-12-25 RX ADMIN — Medication 100 MILLIGRAM(S): at 22:00

## 2018-12-25 RX ADMIN — OXYCODONE HYDROCHLORIDE 10 MILLIGRAM(S): 5 TABLET ORAL at 17:07

## 2018-12-25 RX ADMIN — PANTOPRAZOLE SODIUM 40 MILLIGRAM(S): 20 TABLET, DELAYED RELEASE ORAL at 05:48

## 2018-12-25 RX ADMIN — OXYCODONE HYDROCHLORIDE 10 MILLIGRAM(S): 5 TABLET ORAL at 09:29

## 2018-12-25 RX ADMIN — OXYCODONE HYDROCHLORIDE 10 MILLIGRAM(S): 5 TABLET ORAL at 14:05

## 2018-12-25 RX ADMIN — OXYCODONE HYDROCHLORIDE 10 MILLIGRAM(S): 5 TABLET ORAL at 13:25

## 2018-12-25 RX ADMIN — OXYCODONE HYDROCHLORIDE 10 MILLIGRAM(S): 5 TABLET ORAL at 18:00

## 2018-12-25 RX ADMIN — Medication 1 PATCH: at 19:52

## 2018-12-25 RX ADMIN — Medication 100 MILLIGRAM(S): at 13:26

## 2018-12-25 RX ADMIN — OXYCODONE HYDROCHLORIDE 10 MILLIGRAM(S): 5 TABLET ORAL at 22:04

## 2018-12-25 RX ADMIN — Medication 5 MILLIGRAM(S): at 11:51

## 2018-12-26 LAB — SURGICAL PATHOLOGY STUDY: SIGNIFICANT CHANGE UP

## 2018-12-26 PROCEDURE — 99232 SBSQ HOSP IP/OBS MODERATE 35: CPT

## 2018-12-26 RX ORDER — LACTULOSE 10 G/15ML
20 SOLUTION ORAL ONCE
Qty: 0 | Refills: 0 | Status: COMPLETED | OUTPATIENT
Start: 2018-12-26 | End: 2018-12-26

## 2018-12-26 RX ADMIN — Medication 100 MILLIGRAM(S): at 05:55

## 2018-12-26 RX ADMIN — Medication 100 MILLIGRAM(S): at 14:46

## 2018-12-26 RX ADMIN — Medication 1 PATCH: at 18:00

## 2018-12-26 RX ADMIN — Medication 1 PATCH: at 17:48

## 2018-12-26 RX ADMIN — Medication 1 PATCH: at 18:16

## 2018-12-26 RX ADMIN — OXYCODONE HYDROCHLORIDE 10 MILLIGRAM(S): 5 TABLET ORAL at 12:50

## 2018-12-26 RX ADMIN — Medication 5 MILLIGRAM(S): at 21:09

## 2018-12-26 RX ADMIN — Medication 1 PATCH: at 07:11

## 2018-12-26 RX ADMIN — LACTULOSE 20 GRAM(S): 10 SOLUTION ORAL at 17:48

## 2018-12-26 RX ADMIN — OXYCODONE HYDROCHLORIDE 10 MILLIGRAM(S): 5 TABLET ORAL at 12:03

## 2018-12-26 RX ADMIN — PANTOPRAZOLE SODIUM 40 MILLIGRAM(S): 20 TABLET, DELAYED RELEASE ORAL at 05:55

## 2018-12-27 VITALS
RESPIRATION RATE: 17 BRPM | DIASTOLIC BLOOD PRESSURE: 74 MMHG | SYSTOLIC BLOOD PRESSURE: 118 MMHG | HEART RATE: 89 BPM | TEMPERATURE: 98 F | OXYGEN SATURATION: 99 %

## 2018-12-27 PROCEDURE — 99232 SBSQ HOSP IP/OBS MODERATE 35: CPT

## 2018-12-27 RX ORDER — PANTOPRAZOLE SODIUM 20 MG/1
1 TABLET, DELAYED RELEASE ORAL
Qty: 0 | Refills: 0 | COMMUNITY
Start: 2018-12-27

## 2018-12-27 RX ORDER — DOCUSATE SODIUM 100 MG
1 CAPSULE ORAL
Qty: 0 | Refills: 0 | COMMUNITY
Start: 2018-12-27

## 2018-12-27 RX ORDER — NICOTINE POLACRILEX 2 MG
1 GUM BUCCAL
Qty: 0 | Refills: 0 | COMMUNITY
Start: 2018-12-27

## 2018-12-27 RX ORDER — LANOLIN ALCOHOL/MO/W.PET/CERES
3 CREAM (GRAM) TOPICAL AT BEDTIME
Qty: 0 | Refills: 0 | Status: DISCONTINUED | OUTPATIENT
Start: 2018-12-27 | End: 2018-12-27

## 2018-12-27 RX ADMIN — OXYCODONE HYDROCHLORIDE 10 MILLIGRAM(S): 5 TABLET ORAL at 11:38

## 2018-12-27 RX ADMIN — OXYCODONE HYDROCHLORIDE 10 MILLIGRAM(S): 5 TABLET ORAL at 12:15

## 2018-12-27 RX ADMIN — PANTOPRAZOLE SODIUM 40 MILLIGRAM(S): 20 TABLET, DELAYED RELEASE ORAL at 07:42

## 2018-12-27 RX ADMIN — OXYCODONE HYDROCHLORIDE 10 MILLIGRAM(S): 5 TABLET ORAL at 15:28

## 2018-12-27 RX ADMIN — OXYCODONE HYDROCHLORIDE 10 MILLIGRAM(S): 5 TABLET ORAL at 08:30

## 2018-12-27 RX ADMIN — Medication 1 PATCH: at 07:05

## 2018-12-27 RX ADMIN — OXYCODONE HYDROCHLORIDE 10 MILLIGRAM(S): 5 TABLET ORAL at 07:42

## 2018-12-27 RX ADMIN — Medication 5 MILLIGRAM(S): at 12:48

## 2018-12-27 NOTE — PROGRESS NOTE ADULT - PROBLEM SELECTOR PLAN 1
s/p C4-T1 fusion, T9-12 lami on 12/21. Pain control with flexeril/valium PRN, oxyIR PRN.  post op surgical care per Ortho.
s/p C4-T1 fusion, T9-12 lami on 12/21  post op surgical care per Ortho
s/p C4-T1 fusion, T9-12 lami on 12/21  post op surgical care per Ortho
s/p C4-T1 fusion, T9-12 lami on 12/21. Pain control with flexeril/valium PRN, oxyIR PRN.  post op surgical care per Ortho.

## 2018-12-27 NOTE — PROGRESS NOTE ADULT - SUBJECTIVE AND OBJECTIVE BOX
CC: F/U for spinal lami/fusion    SUBJECTIVE / OVERNIGHT EVENTS:  Pain is improved compared to yesterday.  No F/C, N/V, CP, SOB, Cough, lightheadedness, dizziness, abdominal pain, diarrhea, dysuria.    MEDICATIONS  (STANDING):  docusate sodium 100 milliGRAM(s) Oral three times a day  lactated ringers. 1000 milliLiter(s) (125 mL/Hr) IV Continuous <Continuous>  melatonin 3 milliGRAM(s) Oral at bedtime  nicotine -  14 mG/24Hr(s) Patch 1 patch Transdermal daily  pantoprazole    Tablet 40 milliGRAM(s) Oral before breakfast  senna 2 Tablet(s) Oral at bedtime    MEDICATIONS  (PRN):  acetaminophen   Tablet .. 650 milliGRAM(s) Oral every 6 hours PRN Temp greater or equal to 38C (100.4F)  diazepam    Tablet 5 milliGRAM(s) Oral every 12 hours PRN Anxiety  magnesium hydroxide Suspension 30 milliLiter(s) Oral every 12 hours PRN Constipation  oxyCODONE    IR 5 milliGRAM(s) Oral every 3 hours PRN Mild Pain (1 - 3)  oxyCODONE    IR 10 milliGRAM(s) Oral every 3 hours PRN moderate to severe pain      Vital Signs Last 24 Hrs  T(C): 37.2 (27 Dec 2018 09:28), Max: 37.2 (27 Dec 2018 01:08)  T(F): 99 (27 Dec 2018 09:28), Max: 99 (27 Dec 2018 09:28)  HR: 93 (27 Dec 2018 09:28) (75 - 94)  BP: 126/65 (27 Dec 2018 09:28) (113/70 - 128/72)  BP(mean): --  RR: 17 (27 Dec 2018 09:28) (17 - 18)  SpO2: 99% (27 Dec 2018 09:28) (98% - 99%)  CAPILLARY BLOOD GLUCOSE        I&O's Summary    26 Dec 2018 07:01  -  27 Dec 2018 07:00  --------------------------------------------------------  IN: 0 mL / OUT: 600 mL / NET: -600 mL        PHYSICAL EXAM:  GENERAL: NAD, well-developed  HEAD:  Atraumatic, Normocephalic  EYES: EOMI, PERRLA, conjunctiva and sclera clear  Neck: dressing c/d/i  CHEST/LUNG: Clear to auscultation bilaterally; No wheeze  HEART: s1 s2, regular rhythm and rate   ABDOMEN: Soft, Nontender, Nondistended; Bowel sounds present  EXTREMITIES:  2+ Peripheral Pulses, No clubbing, cyanosis, or edema  PSYCH: AAOx3, calm   NEUROLOGY: non-focal    LABS:                    RADIOLOGY & ADDITIONAL TESTS:    Imaging Personally Reviewed:    Care Discussed with Consultants/Other Providers:    Care Discussed with primary team.
Anesthesia Pain Management Service    SUBJECTIVE: Patient complaining of improving pins and needles on bilateral feet, and stiff pain on the back of his neck and shoulders 3/10.    Pain Scale Score	At rest: __3/10_ 	With Activity: ___ 	[X ] Refer to charted pain scores    THERAPY:    [ ] IV PCA Morphine		[ ] 5 mg/mL	[ ] 1 mg/mL  [X ] IV PCA Hydromorphone	[ ] 5 mg/mL	[X ] 1 mg/mL  [ ] IV PCA Fentanyl		[ ] 50 micrograms/mL    Demand dose __0.3_ lockout __6_ (minutes) Continuous Rate _0__ Total: 3.5___  mg used (in past 24 hours)      MEDICATIONS  (STANDING):  cyclobenzaprine 10 milliGRAM(s) Oral every 8 hours  HYDROmorphone PCA (1 mG/mL) 30 milliLiter(s) PCA Continuous PCA Continuous  lactated ringers. 1000 milliLiter(s) (125 mL/Hr) IV Continuous <Continuous>  senna 2 Tablet(s) Oral at bedtime    MEDICATIONS  (PRN):  butorphanol Injectable 0.125 milliGRAM(s) IV Push every 6 hours PRN Pruritus  diazepam    Tablet 5 milliGRAM(s) Oral every 12 hours PRN Anxiety  HYDROmorphone PCA (1 mG/mL) Rescue Clinician Bolus 0.5 milliGRAM(s) IV Push every 15 minutes PRN for Pain Scale GREATER THAN 6  magnesium hydroxide Suspension 30 milliLiter(s) Oral every 12 hours PRN Constipation  naloxone Injectable 0.1 milliGRAM(s) IV Push every 3 minutes PRN For ANY of the following changes in patient status:  A. RR LESS THAN 10 breaths per minute, B. Oxygen saturation LESS THAN 90%, C. Sedation score of 6  ondansetron Injectable 4 milliGRAM(s) IV Push every 6 hours PRN Nausea      OBJECTIVE: Patient lying in bed with cervical collar.    Sedation Score:	[ X] Alert	[ ] Drowsy 	[ ] Arousable	[ ] Asleep	[ ] Unresponsive    Side Effects:	[X ] None	[ ] Nausea	[ ] Vomiting	[ ] Pruritus  		[ ] Other:    Vital Signs Last 24 Hrs  T(C): 37.3 (21 Dec 2018 09:15), Max: 37.5 (20 Dec 2018 20:00)  T(F): 99.2 (21 Dec 2018 09:15), Max: 99.5 (20 Dec 2018 20:00)  HR: 80 (21 Dec 2018 09:15) (80 - 116)  BP: 143/72 (21 Dec 2018 09:15) (128/98 - 162/106)  BP(mean): 103 (20 Dec 2018 21:30) (102 - 117)  RR: 18 (21 Dec 2018 09:15) (14 - 24)  SpO2: 97% (21 Dec 2018 09:15) (95% - 100%)    ASSESSMENT/ PLAN    Therapy to  be:	[ X] Continue   [ ] Discontinued   [ ] Change to prn Analgesics    Documentation and Verification of current medications:   [X] Done	[ ] Not done, not elligible    Comments:  Continue current pain regimen. Added continuous pulse oximetry.
CC: F/U for laminectomy and fusion    SUBJECTIVE / OVERNIGHT EVENTS:  Pain under better control.  Still requiring pain meds.  Offers no new complaints.  No F/C, N/V, CP, SOB, Cough, lightheadedness, dizziness, abdominal pain, diarrhea, dysuria.    MEDICATIONS  (STANDING):  cyclobenzaprine 10 milliGRAM(s) Oral every 8 hours  lactated ringers. 1000 milliLiter(s) (125 mL/Hr) IV Continuous <Continuous>  senna 2 Tablet(s) Oral at bedtime    MEDICATIONS  (PRN):  acetaminophen   Tablet .. 650 milliGRAM(s) Oral every 6 hours PRN Temp greater or equal to 38C (100.4F)  diazepam    Tablet 5 milliGRAM(s) Oral every 12 hours PRN Anxiety  magnesium hydroxide Suspension 30 milliLiter(s) Oral every 12 hours PRN Constipation  oxyCODONE    IR 5 milliGRAM(s) Oral every 3 hours PRN Mild Pain (1 - 3)  oxyCODONE    IR 10 milliGRAM(s) Oral every 3 hours PRN Moderate Pain (4 - 6)      Vital Signs Last 24 Hrs  T(C): 36.9 (24 Dec 2018 05:28), Max: 37.4 (23 Dec 2018 14:01)  T(F): 98.4 (24 Dec 2018 05:28), Max: 99.4 (23 Dec 2018 14:01)  HR: 98 (24 Dec 2018 05:28) (85 - 98)  BP: 121/77 (24 Dec 2018 05:28) (113/74 - 121/77)  BP(mean): --  RR: 18 (24 Dec 2018 05:28) (17 - 18)  SpO2: 96% (24 Dec 2018 05:28) (96% - 98%)  CAPILLARY BLOOD GLUCOSE        I&O's Summary    23 Dec 2018 07:01  -  24 Dec 2018 07:00  --------------------------------------------------------  IN: 0 mL / OUT: 2202.5 mL / NET: -2202.5 mL        PHYSICAL EXAM:  GENERAL: NAD, well-developed  HEAD:  Atraumatic, Normocephalic  EYES: EOMI, PERRLA, conjunctiva and sclera clear  Neck: dressing c/d/i  CHEST/LUNG: Clear to auscultation bilaterally; No wheeze  HEART: s1 s2, regular rhythm and rate   ABDOMEN: Soft, Nontender, Nondistended; Bowel sounds present  EXTREMITIES:  2+ Peripheral Pulses, No clubbing, cyanosis, or edema  PSYCH: AAOx3, calm   NEUROLOGY: non-focal  SKIN: No rashes or lesions    LABS:                        9.5    8.49  )-----------( 142      ( 23 Dec 2018 05:56 )             28.8     12-23    137  |  101  |  9   ----------------------------<  126<H>  3.6   |  25  |  0.83    Ca    9.0      23 Dec 2018 05:56                RADIOLOGY & ADDITIONAL TESTS:    Imaging Personally Reviewed:    Care Discussed with Consultants/Other Providers:    Care Discussed with primary team.
CC: F/U for spinal lami/fusion    SUBJECTIVE / OVERNIGHT EVENTS:  Offers no new complaints.  Agreeable for rehab.  No F/C, N/V, CP, SOB, Cough, lightheadedness, dizziness, abdominal pain, diarrhea, dysuria.    MEDICATIONS  (STANDING):  docusate sodium 100 milliGRAM(s) Oral three times a day  lactated ringers. 1000 milliLiter(s) (125 mL/Hr) IV Continuous <Continuous>  nicotine -  14 mG/24Hr(s) Patch 1 patch Transdermal daily  pantoprazole    Tablet 40 milliGRAM(s) Oral before breakfast  senna 2 Tablet(s) Oral at bedtime    MEDICATIONS  (PRN):  acetaminophen   Tablet .. 650 milliGRAM(s) Oral every 6 hours PRN Temp greater or equal to 38C (100.4F)  diazepam    Tablet 5 milliGRAM(s) Oral every 12 hours PRN Anxiety  magnesium hydroxide Suspension 30 milliLiter(s) Oral every 12 hours PRN Constipation  oxyCODONE    IR 5 milliGRAM(s) Oral every 3 hours PRN Mild Pain (1 - 3)  oxyCODONE    IR 10 milliGRAM(s) Oral every 3 hours PRN moderate to severe pain      Vital Signs Last 24 Hrs  T(C): 36.9 (26 Dec 2018 09:14), Max: 36.9 (26 Dec 2018 09:14)  T(F): 98.4 (26 Dec 2018 09:14), Max: 98.4 (26 Dec 2018 09:14)  HR: 81 (26 Dec 2018 09:14) (77 - 94)  BP: 124/70 (26 Dec 2018 09:14) (106/68 - 128/77)  BP(mean): --  RR: 18 (26 Dec 2018 09:14) (16 - 18)  SpO2: 98% (26 Dec 2018 09:14) (97% - 98%)  CAPILLARY BLOOD GLUCOSE        I&O's Summary    25 Dec 2018 07:01  -  26 Dec 2018 07:00  --------------------------------------------------------  IN: 0 mL / OUT: 500 mL / NET: -500 mL        PHYSICAL EXAM:  GENERAL: NAD, well-developed  HEAD:  Atraumatic, Normocephalic  EYES: EOMI, PERRLA, conjunctiva and sclera clear  Neck: dressing c/d/i  CHEST/LUNG: Clear to auscultation bilaterally; No wheeze  HEART: s1 s2, regular rhythm and rate   ABDOMEN: Soft, Nontender, Nondistended; Bowel sounds present  EXTREMITIES:  2+ Peripheral Pulses, No clubbing, cyanosis, or edema  PSYCH: AAOx3, calm   NEUROLOGY: non-focal    LABS:                        9.1    3.69  )-----------( 196      ( 25 Dec 2018 06:29 )             27.5     12-25    135  |  96<L>  |  17  ----------------------------<  184<H>  4.3   |  26  |  0.72    Ca    9.4      25 Dec 2018 07:58                RADIOLOGY & ADDITIONAL TESTS:    Imaging Personally Reviewed:    Care Discussed with Consultants/Other Providers:    Care Discussed with primary team.
HPI:  Patient tolerating therapies- min A transfers and gait.  Pain controlled with medications    MEDICATIONS  (STANDING):  docusate sodium 100 milliGRAM(s) Oral three times a day  lactated ringers. 1000 milliLiter(s) (125 mL/Hr) IV Continuous <Continuous>  nicotine -  14 mG/24Hr(s) Patch 1 patch Transdermal daily  pantoprazole    Tablet 40 milliGRAM(s) Oral before breakfast  senna 2 Tablet(s) Oral at bedtime    MEDICATIONS  (PRN):  acetaminophen   Tablet .. 650 milliGRAM(s) Oral every 6 hours PRN Temp greater or equal to 38C (100.4F)  diazepam    Tablet 5 milliGRAM(s) Oral every 12 hours PRN Anxiety  magnesium hydroxide Suspension 30 milliLiter(s) Oral every 12 hours PRN Constipation  oxyCODONE    IR 5 milliGRAM(s) Oral every 3 hours PRN Mild Pain (1 - 3)  oxyCODONE    IR 10 milliGRAM(s) Oral every 3 hours PRN moderate to severe pain    Vital Signs Last 24 Hrs  T(C): 36.9 (26 Dec 2018 09:14), Max: 36.9 (26 Dec 2018 09:14)  T(F): 98.4 (26 Dec 2018 09:14), Max: 98.4 (26 Dec 2018 09:14)  HR: 81 (26 Dec 2018 09:14) (77 - 89)  BP: 124/70 (26 Dec 2018 09:14) (106/68 - 125/76)  BP(mean): --  RR: 18 (26 Dec 2018 09:14) (16 - 18)  SpO2: 98% (26 Dec 2018 09:14) (97% - 98%)      PHYSICAL EXAM  Constitutional - NAD, Comfortable  HEENT - NCAT, EOMI  Neck - Supple, No limited ROM  Chest - CTA bilaterally, No wheeze, No rhonchi, No crackles  Cardiovascular - RRR, S1S2, No murmurs  Abdomen - BS+, Soft, NTND  Extremities - No C/C/E, No calf tenderness   Neurologic Exam -                    Cognitive - Awake, Alert, AAO to self, place, date, year, situation     Communication - Fluent, No dysarthria, no aphasia     Cranial Nerves - CN 2-12 intact     Motor - 4-/5 b/l LE                        Sensory - Intact to LT     Reflexes - DTR Intact, No primitive reflexive     Balance - WNL Static  Psychiatric - Mood stable, Affect WNL                          9.1    3.69  )-----------( 196      ( 25 Dec 2018 06:29 )             27.5     12-25    135  |  96<L>  |  17  ----------------------------<  184<H>  4.3   |  26  |  0.72    Ca    9.4      25 Dec 2018 07:58
Ortho Progress Note    Patient seen and examined. No acute events overnight.   Pain well controlled with current regimen.  C/O of paresthesias in BL fingertips he states have been present and stable since surgery, and prior paresthesias in BL lower extremities which have improved slightly since surgery.        Gen: NAD  Back:   C collar next to patient  dressings C/D/I, dressings changed, incisions C/D/I    BUE: delt 5/5, biceps 5/5, triceps 5/5, wrist ext 5/5, wrist flex 5/5,  5/5	  sensation grossly intact to light touch throughout brisk cap refill 	      BL LE: IP 5/5  GS 5/5 TA 5/5 EHL 5/5   sensation grossly intact to light touch throughout  feet warm                 T(C): 36.4 (12-25-18 @ 05:43), Max: 37.4 (12-24-18 @ 17:11)  HR: 71 (12-25-18 @ 05:43) (71 - 98)  BP: 113/74 (12-25-18 @ 05:43) (113/74 - 136/84)  RR: 18 (12-25-18 @ 05:43) (16 - 18)  SpO2: 95% (12-25-18 @ 05:43) (95% - 100%)  Wt(kg): --      58y Male s/p C3-T2 PSIF, T10-12 lami POD 36    - Pain control  - WBAT  - c-collar  - PT/OT/OOB  - I/S  - SCDs  - Dispo planning    Ortho [id number]
Ortho Progress Note    S: Patient seen and examined. No acute events overnight.   Pain well controlled with current regimen. Denies lightheadedness/dizziness, CP/SOB. Tolerating diet.       O:  NAD  Back:   C collar in place  dressings C/D/I, mild appropriate stephanie-incisional ttp  HMV x1: 0/2.5  Neuro:  BUE delt 5/5, biceps 5/5, triceps 5/5, EPL 5/5, FDP 5/5, IO 5/5	  SILT C5-T1  Vital Signs Last 24 Hrs T(C): 36.9 (24 Dec 2018 05:28), Max: 37.8 (23 Dec 2018 09:52) T(F): 98.4 (24 Dec 2018 05:28), Max: 100 (23 Dec 2018 09:52) HR: 98 (24 Dec 2018 05:28) (85 - 103) BP: 121/77 (24 Dec 2018 05:28) (113/74 - 127/84) BP(mean): -- RR: 18 (24 Dec 2018 05:28) (17 - 18) SpO2: 96% (24 Dec 2018 05:28) (96% - 98%)	    WWP distally  RLE IP 5/5 HS 5/5 Q 5/5 GS 5/5 TA 5/5 EHL 5/5   SILT L2-S1  LLE IP 5/5 HS 5/5 Q 5/5 GS 5/5 TA 5/5 EHL 5/5                   -------
Ortho Progress Note    S: Patient seen and examined. No acute events overnight.   Pain well controlled with current regimen. Denies lightheadedness/dizziness, CP/SOB. Tolerating diet.       O:  NAD  Back:   C collar in place  dressings C/D/I, mild appropriate stephanie-incisional ttp  Neuro:  BUE delt 5/5, biceps 5/5, triceps 5/5, EPL 5/5, FDP 5/5, IO 5/5	  SILT C5-T1 	    WWP distally  RLE IP 5/5 HS 5/5 Q 5/5 GS 5/5 TA 5/5 EHL 5/5   SILT L2-S1  LLE IP 5/5 HS 5/5 Q 5/5 GS 5/5 TA 5/5 EHL 5/5               T(C): 36.4 (12-25-18 @ 05:43), Max: 37.4 (12-24-18 @ 17:11)  HR: 71 (12-25-18 @ 05:43) (71 - 98)  BP: 113/74 (12-25-18 @ 05:43) (113/74 - 136/84)  RR: 18 (12-25-18 @ 05:43) (16 - 18)  SpO2: 95% (12-25-18 @ 05:43) (95% - 100%)  Wt(kg): --      58y Male POD5 s/p C3-T2 PSIF, T10-12 lami    - Pain control  - FU labs  - WBAT  - c-collar  - PT/OT/OOB  - I/S  - SCDs  - Dispo planning    Ortho [id number]
Ortho Progress Note    S: Patient seen and examined. No acute events overnight.  Pain well controlled.   Bilateral upper extremity paraesthesias improving.  Bilateral lower extremity paraesthesias present in feet.        O:  Spine PE:  Dressing clean dry intact      Gen: NAD    Spine PE:  Skin intact  No gross deformity  No bony step offs  No paraspinal muscle ttp/hypertonicity   Negative babinski  Negative willson  No saddle anesthesia      Motor:                   C5                C6              C7               C8           T1   R            5/5                5/5            5/5             5/5          5/5  L             5/5               5/5             5/5             5/5          5/5                L2             L3             L4               L5            S1  R         5/5           5/5          5/5             5/5           5/5  L          5/5          5/5           5/5             5/5           5/5    Sensory:            C5         C6         C7      C8       T1        (0=absent, 1=impaired, 2=normal, NT=not testable)  R         2            2           2        2         2  L          2            2           2        2         2               L2          L3         L4      L5       S1         (0=absent, 1=impaired, 2=normal, NT=not testable)  R         2            2            2        2        2  L          2            2           2        2         2      Vital Signs Last 24 Hrs  T(C): 37.1 (27 Dec 2018 05:47), Max: 37.2 (27 Dec 2018 01:08)  T(F): 98.8 (27 Dec 2018 05:47), Max: 98.9 (27 Dec 2018 01:08)  HR: 88 (27 Dec 2018 05:47) (75 - 94)  BP: 117/74 (27 Dec 2018 05:47) (113/70 - 128/72)  BP(mean): --  RR: 17 (27 Dec 2018 05:47) (17 - 18)  SpO2: 99% (27 Dec 2018 05:47) (98% - 99%)      58y Male POD7 s/p C3-T2 PSIF, T10-12 lami, doing well approaching discharge.     - Pain control  - FU labs  - WBAT  - c-collar  - PT/OT/OOB  - I/S  - SCDs  - Dispo planning    Ortho [id number]
Ortho Progress Note    S: Patient seen and examined. No acute events overnight. Pain well controlled with current regimen. Denies lightheadedness/dizziness, CP/SOB. Tolerating diet.       O:  NAD  Back:   c collar in place  dressings C/D/I, mild appropriate stephanie-incisional ttp  HMV x2 in place w/ serosanguinous output  Neuro:  BUE delt 5/5, biceps 5/5, triceps 5/5, EPL 5/5, FDP 5/5, IO 5/5	  SILT C5-T1	    WWP distally  RLE IP 5/5 HS 5/5 Q 5/5 GS 5/5 TA 5/5 EHL 5/5   SILT L2-S1  LLE IP 5/5 HS 5/5 Q 5/5 GS 5/5 TA 5/5 EHL 5/5    Vital Signs Last 24 Hrs  T(C): 37.4 (23 Dec 2018 05:02), Max: 37.9 (22 Dec 2018 17:29)  T(F): 99.3 (23 Dec 2018 05:02), Max: 100.3 (22 Dec 2018 17:29)  HR: 96 (23 Dec 2018 05:02) (96 - 100)  BP: 130/77 (23 Dec 2018 05:02) (124/79 - 142/72)  BP(mean): --  RR: 17 (23 Dec 2018 05:02) (16 - 17)  SpO2: 96% (23 Dec 2018 05:02) (94% - 97%)                          9.5    8.49  )-----------( 142      ( 23 Dec 2018 05:56 )             28.8                         9.3    9.16  )-----------( 123      ( 22 Dec 2018 05:30 )             27.5       12-22    136  |  99  |  11  ----------------------------<  160<H>  3.8   |  27  |  0.85
Orthopaedic Surgery POC    Subjective:   Patient seen and examined  No acute events postop  Pain well controlled  b/l UE/LE paresthesias unchanged from preop  Denies cp/sob/f/c    Objective:  ICU Vital Signs Last 24 Hrs  T(C): 37.3 (21 Dec 2018 05:47), Max: 37.5 (20 Dec 2018 20:00)  T(F): 99.2 (21 Dec 2018 05:47), Max: 99.5 (20 Dec 2018 20:00)  HR: 81 (21 Dec 2018 05:47) (57 - 116)  BP: 137/72 (21 Dec 2018 05:47) (123/72 - 162/106)  BP(mean): 103 (20 Dec 2018 21:30) (102 - 117)  ABP: 134/85 (20 Dec 2018 21:30) (125/90 - 173/94)  ABP(mean): 127 (20 Dec 2018 21:30) (103 - 229)  RR: 18 (21 Dec 2018 05:47) (14 - 24)  SpO2: 99% (21 Dec 2018 05:47) (95% - 100%)      PE    NAD  Neck: dressing c/d/o, c-collar in place, HV with ss output  Back:   dressing C/D/I, mild appropriate stephanie-incisional ttp  HMV in place w/ serosanguinous output  Neuro:  RLE IP 5/5 HS 5/5 Q 5/5 GS 5/5 TA 5/5 EHL 5/5   dec sensation L2-S1  LLE IP 5/5 HS 5/5 Q 5/5 GS 5/5 TA 5/5 EHL 5/5  dec sensation L2-S1  WWP BLE    RUE Delt 5/5 Bi 5/5 Tri 5/5 Wrist ext 5/5  5/5  dec sensation C5-T1  LUE Delt 5/5 Bi 5/5 Tri 5/5 Wrist ext 5/5  5/5  dec sensation C5-T1  WWP BUE      58y Male s/p C3-T2 PSIF, T10-12 laminectomy  - Pain control, PCA  - C collar  - WBAT  - PT/OT/OOB  - I/S  - Monitor HMV output  - SCDs
Orthopaedic Surgery POC    Subjective:   Patient seen and examined  No acute events postop  Pain well controlled  b/l UE/LE paresthesias unchanged from preop  Denies cp/sob/f/c    Objective:  T(C): 37.3 (12-20-18 @ 19:00), Max: 37.3 (12-20-18 @ 19:00)  HR: 104 (12-20-18 @ 19:30) (57 - 104)  BP: 159/98 (12-20-18 @ 18:15) (123/72 - 159/98)  RR: 17 (12-20-18 @ 19:30) (16 - 24)  SpO2: 95% (12-20-18 @ 19:30) (95% - 96%)  Wt(kg): --    12-20 @ 07:01  -  12-20 @ 19:40  --------------------------------------------------------  IN: 125 mL / OUT: 170 mL / NET: -45 mL        PE    NAD  Neck: dressing c/d/o, c-collar in place, HV with ss output  Back:   dressing C/D/I, mild appropriate stephanie-incisional ttp  HMV in place w/ serosanguinous output  Neuro:  RLE IP 5/5 HS 5/5 Q 5/5 GS 5/5 TA 5/5 EHL 5/5   dec sensation L2-S1  LLE IP 5/5 HS 5/5 Q 5/5 GS 5/5 TA 5/5 EHL 5/5  dec sensation L2-S1  WWP BLE    RUE Delt 5/5 Bi 5/5 Tri 5/5 Wrist ext 5/5  5/5  dec sensation C5-T1  LUE Delt 5/5 Bi 5/5 Tri 5/5 Wrist ext 5/5  5/5  dec sensation C5-T1  WWP BUE                          13.2   8.40  )-----------( 178      ( 20 Dec 2018 18:30 )             40.2     12-20    142  |  106  |  8   ----------------------------<  171<H>  4.5   |  23  |  0.98    Ca    9.2      20 Dec 2018 18:30      58y Male s/p C3-T2 PSIF, T10-12 laminectomy  - Pain control, PCA  - C collar  - WBAT  - PT/OT/OOB  - I/S  - Monitor HMV output  - SCDs
Orthopaedic Surgery Progress Note    Subjective:   Patient seen and examined  No acute events overnight  Pain controlled with PCA  Ambulated a few feet with PT yesterday    Objective:  T(C): 37.4 (12-22-18 @ 05:06), Max: 38.4 (12-22-18 @ 01:10)  HR: 98 (12-22-18 @ 05:06) (80 - 110)  BP: 136/64 (12-22-18 @ 05:06) (136/64 - 155/82)  RR: 15 (12-22-18 @ 05:06) (15 - 18)  SpO2: 98% (12-22-18 @ 05:06) (95% - 98%)    12-20 @ 07:01  -  12-21 @ 07:00  --------------------------------------------------------  IN: 500 mL / OUT: 807.5 mL / NET: -307.5 mL    12-21 @ 07:01  -  12-22 @ 05:47  --------------------------------------------------------  IN: 875 mL / OUT: 1070 mL / NET: -195 mL    PE  NAD  Back:   c collar in place  dressings C/D/I, mild appropriate stephanie-incisional ttp  HMV x2 in place w/ serosanguinous output  Neuro:  BUE delt 5/5, biceps 5/5, triceps 5/5, EPL 5/5, FDP 5/5, IO 5/5	  SILT C5-T1	  WWP distally  RLE IP 5/5 HS 5/5 Q 5/5 GS 5/5 TA 5/5 EHL 5/5   SILT L2-S1  LLE IP 5/5 HS 5/5 Q 5/5 GS 5/5 TA 5/5 EHL 5/5  SILT L2-S1  WWP BLE                        11.1   9.05  )-----------( 158      ( 21 Dec 2018 06:05 )             32.7     12-21    138  |  102  |  13  ----------------------------<  157<H>  4.3   |  24  |  0.94    Ca    8.9      21 Dec 2018 06:05    58y Male POD2 s/p C3-T2 PSIF, T10-12 lami    - Pain control: wean PCA as tolerated  - FU labs  - WBAT  - c-collar  - PT/OT/OOB  - I/S  - Monitor HMV outputs  - SCDs  - Dispo planning    Aravind Ramirez MD
Patient is a 58y old  Male who presents with a chief complaint of Spinal stenosis (21 Dec 2018 13:11)      SUBJECTIVE / OVERNIGHT EVENTS:  Pt was seen and examined at noon. Pt was sitting up. nontoxic appearance. He said numbness in feet improved. denied cp/sob/palpitation. Noted to have fever over night     MEDICATIONS  (STANDING):  cyclobenzaprine 10 milliGRAM(s) Oral every 8 hours  lactated ringers. 1000 milliLiter(s) (125 mL/Hr) IV Continuous <Continuous>  senna 2 Tablet(s) Oral at bedtime    MEDICATIONS  (PRN):  acetaminophen   Tablet .. 650 milliGRAM(s) Oral every 6 hours PRN Temp greater or equal to 38C (100.4F)  diazepam    Tablet 5 milliGRAM(s) Oral every 12 hours PRN Anxiety  HYDROmorphone  Injectable 0.5 milliGRAM(s) IV Push every 3 hours PRN Severe Pain (7 - 10) or Breakthrough Pain  magnesium hydroxide Suspension 30 milliLiter(s) Oral every 12 hours PRN Constipation  oxyCODONE    IR 5 milliGRAM(s) Oral every 3 hours PRN Mild Pain (1 - 3)  oxyCODONE    IR 10 milliGRAM(s) Oral every 3 hours PRN Moderate Pain (4 - 6)      T(C): 37.6 (12-22-18 @ 12:54), Max: 38.4 (12-22-18 @ 01:10)  HR: 100 (12-22-18 @ 12:54) (98 - 110)  BP: 140/70 (12-22-18 @ 12:54) (136/64 - 155/82)  RR: 16 (12-22-18 @ 12:54) (15 - 17)  SpO2: 97% (12-22-18 @ 12:54) (95% - 98%)  CAPILLARY BLOOD GLUCOSE        I&O's Summary    21 Dec 2018 07:01  -  22 Dec 2018 07:00  --------------------------------------------------------  IN: 875 mL / OUT: 1075 mL / NET: -200 mL    22 Dec 2018 07:01  -  22 Dec 2018 16:58  --------------------------------------------------------  IN: 0 mL / OUT: 422.5 mL / NET: -422.5 mL        PHYSICAL EXAM:  GENERAL: NAD, well-developed  HEAD:  Atraumatic, Normocephalic  EYES: EOMI, PERRLA, conjunctiva and sclera clear  Neck: dressing c/d/i  CHEST/LUNG: Clear to auscultation bilaterally; No wheeze  HEART: s1 s2, regular rhythm and rate   ABDOMEN: Soft, Nontender, Nondistended; Bowel sounds present  EXTREMITIES:  2+ Peripheral Pulses, No clubbing, cyanosis, or edema  PSYCH: AAOx3, calm   NEUROLOGY: non-focal  SKIN: No rashes or lesions    LABS:                        9.3    9.16  )-----------( 123      ( 22 Dec 2018 05:30 )             27.5     12-22    136  |  99  |  11  ----------------------------<  160<H>  3.8   |  27  |  0.85    Ca    8.6      22 Dec 2018 05:30                RADIOLOGY & ADDITIONAL TESTS:    Imaging Personally Reviewed:    Consultant(s) Notes Reviewed:      Care Discussed with Consultants/Other Providers:
Patient is a 58y old  Male who presents with a chief complaint of s/p C4-T1 Posterior Spinal Decompression Fusion,  T9-T11 Laminectomy (24 Dec 2018 12:35)      SUBJECTIVE / OVERNIGHT EVENTS: patient seen and examined by bedside at 10;45 Am, no acute distress noted  no acute events over night        MEDICATIONS  (STANDING):  docusate sodium 100 milliGRAM(s) Oral three times a day  lactated ringers. 1000 milliLiter(s) (125 mL/Hr) IV Continuous <Continuous>  pantoprazole    Tablet 40 milliGRAM(s) Oral before breakfast  senna 2 Tablet(s) Oral at bedtime    MEDICATIONS  (PRN):  acetaminophen   Tablet .. 650 milliGRAM(s) Oral every 6 hours PRN Temp greater or equal to 38C (100.4F)  diazepam    Tablet 5 milliGRAM(s) Oral every 12 hours PRN Anxiety  magnesium hydroxide Suspension 30 milliLiter(s) Oral every 12 hours PRN Constipation  oxyCODONE    IR 10 milliGRAM(s) Oral every 3 hours PRN moderate to severe pain  oxyCODONE    IR 5 milliGRAM(s) Oral every 3 hours PRN Mild Pain (1 - 3)      Vital Signs Last 24 Hrs  T(C): 36.8 (25 Dec 2018 13:24), Max: 37.4 (24 Dec 2018 17:11)  T(F): 98.3 (25 Dec 2018 13:24), Max: 99.3 (24 Dec 2018 17:11)  HR: 94 (25 Dec 2018 13:24) (71 - 98)  BP: 128/77 (25 Dec 2018 13:24) (113/74 - 136/84)  BP(mean): --  RR: 16 (25 Dec 2018 13:24) (16 - 18)  SpO2: 98% (25 Dec 2018 13:24) (95% - 100%)  CAPILLARY BLOOD GLUCOSE        I&O's Summary    24 Dec 2018 07:01  -  25 Dec 2018 07:00  --------------------------------------------------------  IN: 0 mL / OUT: 1020 mL / NET: -1020 mL    25 Dec 2018 07:01  -  25 Dec 2018 14:08  --------------------------------------------------------  IN: 0 mL / OUT: 300 mL / NET: -300 mL      PHYSICAL EXAM:  GENERAL: NAD, well-developed  HEAD:  Atraumatic, Normocephalic  EYES: EOMI, PERRLA, conjunctiva and sclera clear  Neck: dressing c/d/i  CHEST/LUNG: Clear to auscultation bilaterally; No wheeze  HEART: s1 s2, regular rhythm and rate   ABDOMEN: Soft, Nontender, Nondistended; Bowel sounds present  EXTREMITIES:  2+ Peripheral Pulses, No clubbing, cyanosis, or edema  PSYCH: AAOx3, calm   NEUROLOGY: non-focal          LABS:                        9.1    3.69  )-----------( 196      ( 25 Dec 2018 06:29 )             27.5     12-25    135  |  96<L>  |  17  ----------------------------<  184<H>  4.3   |  26  |  0.72    Ca    9.4      25 Dec 2018 07:58                Consultant(s) Notes Reviewed:  ortho    Care Discussed with Consultants/Other Providers: ortho
Patient is a 58y old  Male who presents with a chief complaint of spinal stenosis (22 Dec 2018 16:58)      SUBJECTIVE / OVERNIGHT EVENTS:  Pt was seen and examined at noon. Pt reports some pain at base of the neck and radiating to the shoulders. otherwise feels well, no cp/sob/cough/n/v/d/rash/dysuria.     MEDICATIONS  (STANDING):  cyclobenzaprine 10 milliGRAM(s) Oral every 8 hours  lactated ringers. 1000 milliLiter(s) (125 mL/Hr) IV Continuous <Continuous>  senna 2 Tablet(s) Oral at bedtime    MEDICATIONS  (PRN):  acetaminophen   Tablet .. 650 milliGRAM(s) Oral every 6 hours PRN Temp greater or equal to 38C (100.4F)  diazepam    Tablet 5 milliGRAM(s) Oral every 12 hours PRN Anxiety  magnesium hydroxide Suspension 30 milliLiter(s) Oral every 12 hours PRN Constipation  oxyCODONE    IR 5 milliGRAM(s) Oral every 3 hours PRN Mild Pain (1 - 3)  oxyCODONE    IR 10 milliGRAM(s) Oral every 3 hours PRN Moderate Pain (4 - 6)      T(C): 37.4 (12-23-18 @ 14:01), Max: 37.9 (12-22-18 @ 17:29)  HR: 93 (12-23-18 @ 14:01) (93 - 103)  BP: 121/71 (12-23-18 @ 14:01) (121/71 - 138/81)  RR: 17 (12-23-18 @ 14:01) (16 - 17)  SpO2: 98% (12-23-18 @ 14:01) (94% - 98%)  CAPILLARY BLOOD GLUCOSE        I&O's Summary    22 Dec 2018 07:01  -  23 Dec 2018 07:00  --------------------------------------------------------  IN: 0 mL / OUT: 2372.5 mL / NET: -2372.5 mL    23 Dec 2018 07:01  -  23 Dec 2018 16:43  --------------------------------------------------------  IN: 0 mL / OUT: 1302.5 mL / NET: -1302.5 mL        PHYSICAL EXAM:  GENERAL: NAD, well-developed  HEAD:  Atraumatic, Normocephalic  EYES: EOMI, PERRLA, conjunctiva and sclera clear  Neck: dressing c/d/i  CHEST/LUNG: Clear to auscultation bilaterally; No wheeze  HEART: s1 s2, regular rhythm and rate   ABDOMEN: Soft, Nontender, Nondistended; Bowel sounds present  EXTREMITIES:  2+ Peripheral Pulses, No clubbing, cyanosis, or edema  PSYCH: AAOx3, calm   NEUROLOGY: non-focal  SKIN: No rashes or lesions    LABS:                        9.5    8.49  )-----------( 142      ( 23 Dec 2018 05:56 )             28.8     12-23    137  |  101  |  9   ----------------------------<  126<H>  3.6   |  25  |  0.83    Ca    9.0      23 Dec 2018 05:56                RADIOLOGY & ADDITIONAL TESTS:    Imaging Personally Reviewed:    Consultant(s) Notes Reviewed:      Care Discussed with Consultants/Other Providers:
Patient seen and examined at bedside.  No overnight events.  The patient attempted to stand for physical therapy session and lost balance, reporting feeling unsteady on his feet.  Denies dizzyness, confusion, chest pain, palpitations, shortness of breath, bowel or bladder incontinence.      Physical Exam:    Motor Strength 5/5 of the bilateral upper/lower extremities  Sensation: decreased L4-S1     Incision sites: well-approximated. Mild swelling. No drainage.    HV drain removed without issues.      Gauze and tegaderm dressings applied      Vital Signs Last 24 Hrs  T(C): 37 (24 Dec 2018 10:16), Max: 37.4 (23 Dec 2018 14:01)  T(F): 98.6 (24 Dec 2018 10:16), Max: 99.4 (23 Dec 2018 14:01)  HR: 93 (24 Dec 2018 10:16) (85 - 98)  BP: 117/70 (24 Dec 2018 10:16) (113/74 - 121/77)  BP(mean): --  RR: 18 (24 Dec 2018 10:16) (17 - 18)  SpO2: 100% (24 Dec 2018 10:16) (96% - 100%)  12-23    137  |  101  |  9   ----------------------------<  126<H>  3.6   |  25  |  0.83    Ca    9.0      23 Dec 2018 05:56                            9.5    8.49  )-----------( 142      ( 23 Dec 2018 05:56 )             28.8     MEDICATIONS  (STANDING):  cyclobenzaprine 10 milliGRAM(s) Oral every 8 hours  lactated ringers. 1000 milliLiter(s) (125 mL/Hr) IV Continuous <Continuous>  senna 2 Tablet(s) Oral at bedtime    MEDICATIONS  (PRN):  acetaminophen   Tablet .. 650 milliGRAM(s) Oral every 6 hours PRN Temp greater or equal to 38C (100.4F)  diazepam    Tablet 5 milliGRAM(s) Oral every 12 hours PRN Anxiety  magnesium hydroxide Suspension 30 milliLiter(s) Oral every 12 hours PRN Constipation  oxyCODONE    IR 5 milliGRAM(s) Oral every 3 hours PRN Mild Pain (1 - 3)  oxyCODONE    IR 10 milliGRAM(s) Oral every 3 hours PRN Moderate Pain (4 - 6)      Assessment/Plan:  Mr. Brandon is a 59 y/o M status post C4-T1 Posterior Spinal Decompression Fusion,  T9-T11 Laminectomy,  POD #4,  secondary to cervical and thoracic stenosis with myeloradiculopathy.  The patient is making daily progress post-operatively.  Pain is well-controlled with current PO regimen.  Continued physical therapy in an acute rehabilitation setting is recommended at this time for additional gait training, stabilization exercises, as well as other modalities, in order to optimize overall functional status.       -Consult PM&R : Dr. Momin for dispo plan  -pain control  -PT/OT  -Incentive spirometry  -PPI  -Venodynes  -Bowel Regimen

## 2018-12-27 NOTE — PROGRESS NOTE ADULT - ATTENDING COMMENTS
Patient seen and examined.  Ambulation improving.  Neuro exam stable.  PT, pain control, dvt ppx, dispo planning
Neftali Medellin MD  pager# [phone]
Dispo : rehab     Dariusz Blanton MD   Pager  # [phone]
Neftail Medellin MD  pager# [phone]
Neftali Medellin MD  pager# [phone]

## 2018-12-27 NOTE — PROGRESS NOTE ADULT - ASSESSMENT
58M with spinal stenosis s/p C4-T1 fusion, T9-12 laminectomy on 12/20 complicated by post-op anemia.
58y Male POD3 s/p C3-T2 PSIF, T10-12 lami    - Pain control  - FU labs  - WBAT  - c-collar  - PT/OT/OOB  - I/S  - Monitor HMV outputs  - SCDs  - Dispo planning    Ortho [id number]
58y Male POD3 s/p C3-T2 PSIF, T10-12 lami    - Pain control: wean PCA as tolerated  - FU labs  - WBAT  - c-collar  - PT/OT/OOB  - I/S  - Monitor HMV outputs  - SCDs  - Dispo planning    Ortho [id number]
Gait dysfunction    1. PT- bed mobility,transfers, gait and balance training  2. OT- ADL'S  3. s/p decompression- continue pain meds, bowel regimen.   4. Patient would benefit from acute rehab, needs a multidisciplinary team including PT, OT . Can tolerate 3 hours of therapy a day.  Will follow.

## 2018-12-27 NOTE — PROGRESS NOTE ADULT - REASON FOR ADMISSION
s/p C4-T1 Posterior Spinal Decompression Fusion,  T9-T11 Laminectomy
spinal stenosis
spinal stenosis
stenosis
F/U for spinal stenosis
spinal stenosis
laminectomy

## 2018-12-27 NOTE — PROGRESS NOTE ADULT - PROBLEM SELECTOR PLAN 2
h/h stable. no sign of active bleeding. No indication for PRBC transfusion.
h/h stable. no sign of active bleeding
h/h stable. no sign of active bleeding
h/h stable. no sign of active bleeding. No indication for PRBC transfusion.

## 2018-12-28 ENCOUNTER — OTHER (OUTPATIENT)
Age: 58
End: 2018-12-28

## 2018-12-28 ENCOUNTER — INBOUND DOCUMENT (OUTPATIENT)
Age: 58
End: 2018-12-28

## 2019-01-02 ENCOUNTER — CHART COPY (OUTPATIENT)
Age: 59
End: 2019-01-02

## 2019-01-02 ENCOUNTER — OTHER (OUTPATIENT)
Age: 59
End: 2019-01-02

## 2019-01-08 ENCOUNTER — APPOINTMENT (OUTPATIENT)
Dept: INTERNAL MEDICINE | Facility: CLINIC | Age: 59
End: 2019-01-08

## 2019-01-09 PROBLEM — E55.9 VITAMIN D DEFICIENCY, UNSPECIFIED: Chronic | Status: ACTIVE | Noted: 2018-11-29

## 2019-01-09 PROBLEM — M48.00 SPINAL STENOSIS, SITE UNSPECIFIED: Chronic | Status: ACTIVE | Noted: 2018-11-29

## 2019-01-16 ENCOUNTER — APPOINTMENT (OUTPATIENT)
Dept: ORTHOPEDIC SURGERY | Facility: CLINIC | Age: 59
End: 2019-01-16
Payer: COMMERCIAL

## 2019-01-16 PROCEDURE — 99024 POSTOP FOLLOW-UP VISIT: CPT

## 2019-01-16 PROCEDURE — 72040 X-RAY EXAM NECK SPINE 2-3 VW: CPT

## 2019-01-16 NOTE — HISTORY OF PRESENT ILLNESS
[All Other ROS Normal] : All other review of systems are negative except as noted [All Hx] : past medical, family, and social [All] : medication and allergy [FreeTextEntry1] : s/p C4-T1 fusion T9-11 laminectomy [FreeTextEntry2] : Mr. Brandon presents to the office s/p C4-T1 fusion on 12/20/18.  Overall he is doing well.  He has minimal neck pain and the numbness in his legs is significantly better.

## 2019-01-16 NOTE — PHYSICAL EXAM
[Walker] : ambulates with walker [FreeTextEntry2] : His incisions are healing well. Stable strength and sensation bilateral upper and lower extremities [de-identified] : AP lateral cervical x-rays demonstrates instrument at C4-T1 fusion

## 2019-01-16 NOTE — DISCUSSION/SUMMARY
[de-identified] : He will continue  with a cervical collar for another 2 weeks. He will continue with therapy. Followup in 4 weeks.

## 2019-02-05 ENCOUNTER — APPOINTMENT (OUTPATIENT)
Dept: NEUROLOGY | Facility: CLINIC | Age: 59
End: 2019-02-05

## 2019-02-06 ENCOUNTER — APPOINTMENT (OUTPATIENT)
Dept: NEUROLOGY | Facility: CLINIC | Age: 59
End: 2019-02-06

## 2019-02-06 ENCOUNTER — CHART COPY (OUTPATIENT)
Age: 59
End: 2019-02-06

## 2019-02-13 ENCOUNTER — APPOINTMENT (OUTPATIENT)
Dept: ORTHOPEDIC SURGERY | Facility: CLINIC | Age: 59
End: 2019-02-13
Payer: COMMERCIAL

## 2019-02-13 PROCEDURE — 99024 POSTOP FOLLOW-UP VISIT: CPT

## 2019-02-13 PROCEDURE — 72040 X-RAY EXAM NECK SPINE 2-3 VW: CPT

## 2019-02-13 NOTE — PHYSICAL EXAM
[Cane] : ambulates with cane [FreeTextEntry2] : His incisions are healled. Stable strength and sensation bilateral upper and lower extremities [de-identified] : AP lateral cervical x-rays demonstrates instrument at C4-T1 fusion

## 2019-02-13 NOTE — DISCUSSION/SUMMARY
[de-identified] : Overall he is recovering. She will continue with home exercises. Followup in 6 weeks

## 2019-03-20 ENCOUNTER — FORM ENCOUNTER (OUTPATIENT)
Age: 59
End: 2019-03-20

## 2019-03-29 ENCOUNTER — APPOINTMENT (OUTPATIENT)
Dept: ORTHOPEDIC SURGERY | Facility: CLINIC | Age: 59
End: 2019-03-29
Payer: SELF-PAY

## 2019-03-29 PROCEDURE — 99214 OFFICE O/P EST MOD 30 MIN: CPT

## 2019-03-29 PROCEDURE — 72040 X-RAY EXAM NECK SPINE 2-3 VW: CPT

## 2019-03-29 NOTE — PHYSICAL EXAM
[Cane] : ambulates with cane [FreeTextEntry2] : His incisions are healed. Stable strength and sensation bilateral upper and lower extremities [de-identified] : AP lateral cervical x-rays demonstrates instrument at C4-T1 fusion

## 2019-03-29 NOTE — HISTORY OF PRESENT ILLNESS
[All Other ROS Normal] : All other review of systems are negative except as noted [All Hx] : past medical, family, and social [All] : medication and allergy [FreeTextEntry1] : s/p C4-T1 fusion T9-11 laminectomy [FreeTextEntry2] : Mr. Brandon presents to the office s/p C4-T1 fusion on 12/20/18.  He complains of a leg heaviness/knee pain with walking.  He feels like  there is an awkwardness with walking.

## 2019-03-29 NOTE — DISCUSSION/SUMMARY
[de-identified] : Discussed further treatment options. She will continue with exercises. He just returned from posterior reach and was doing a lot of walking. I've advised him on some lower extremity stretches. Followup in 2-3 months.

## 2019-04-02 ENCOUNTER — APPOINTMENT (OUTPATIENT)
Dept: UROLOGY | Facility: CLINIC | Age: 59
End: 2019-04-02

## 2019-05-07 NOTE — H&P PST ADULT - MALLAMPATI CLASS
Class I (easy) - visualization of the soft palate, fauces, uvula, and both anterior and posterior pillars
none

## 2019-05-16 ENCOUNTER — APPOINTMENT (OUTPATIENT)
Dept: INTERNAL MEDICINE | Facility: CLINIC | Age: 59
End: 2019-05-16
Payer: COMMERCIAL

## 2019-05-16 ENCOUNTER — LABORATORY RESULT (OUTPATIENT)
Age: 59
End: 2019-05-16

## 2019-05-16 VITALS
DIASTOLIC BLOOD PRESSURE: 79 MMHG | HEART RATE: 65 BPM | OXYGEN SATURATION: 98 % | HEIGHT: 68 IN | TEMPERATURE: 97.7 F | SYSTOLIC BLOOD PRESSURE: 135 MMHG | WEIGHT: 157 LBS | BODY MASS INDEX: 23.79 KG/M2

## 2019-05-16 DIAGNOSIS — G62.9 POLYNEUROPATHY, UNSPECIFIED: ICD-10-CM

## 2019-05-16 DIAGNOSIS — N40.2 NODULAR PROSTATE W/OUT LOWER URINARY TRACT SYMPTOMS: ICD-10-CM

## 2019-05-16 PROCEDURE — 99214 OFFICE O/P EST MOD 30 MIN: CPT

## 2019-05-16 RX ORDER — PANTOPRAZOLE 20 MG/1
20 TABLET, DELAYED RELEASE ORAL
Qty: 30 | Refills: 2 | Status: COMPLETED | COMMUNITY
Start: 2018-12-06 | End: 2019-05-16

## 2019-05-16 RX ORDER — PHENYLEPHRINE HCL 10 MG
7 TABLET ORAL DAILY
Qty: 14 | Refills: 0 | Status: COMPLETED | COMMUNITY
Start: 2018-11-20 | End: 2019-05-16

## 2019-05-16 NOTE — HISTORY OF PRESENT ILLNESS
[FreeTextEntry1] : neck pain lower back pain [de-identified] : Pt is sp c4 and t4 fusions  and has stiffness in his knees and balance is still off but has improved.  he has herniated discs in his lumbar-sacral spine.   He has started to exercise and not going to [employer] due to high co payment.  he is continuing it at home.   His numbness is from thighs to toes and pins and needles in his toes .  He has lumbo sacral stenosis.  he was also told he cant go back to work.  He will be seeing  spine specialist tomorrow.

## 2019-05-16 NOTE — HEALTH RISK ASSESSMENT
[No falls in past year] : Patient reported no falls in the past year [0] : 2) Feeling down, depressed, or hopeless: Not at all (0) [] : No [de-identified] : Dr Campo [de-identified] : starting to exercise  [de-identified] : low salt  [[id number]] : 0

## 2019-05-16 NOTE — ASSESSMENT
[FreeTextEntry1] : Pt asked about hiv prevention since his partner has cheated he has be negative and will need to be retested and has hep B  positive and states eh had vaccines . I will test for core ab if positive I will advise him against this due to flares of hep B when stopping truvada.  He also needs hiv testing every 3 months. I will discuss risks of medication.   paresthesia he will take b complex  50 qr102gh a day.  .  he will see Dr Campo  .  Prostate nodule he will have psa and also take us of prostate and see urologist in fu.  .  He continues to have pain and imbalance with improvement of some of his paresthesia  but now states he has lower extremities having kparesthesia.

## 2019-05-17 ENCOUNTER — APPOINTMENT (OUTPATIENT)
Dept: ORTHOPEDIC SURGERY | Facility: CLINIC | Age: 59
End: 2019-05-17
Payer: COMMERCIAL

## 2019-05-17 LAB
25(OH)D3 SERPL-MCNC: 22.9 NG/ML
ALBUMIN SERPL ELPH-MCNC: 4.8 G/DL
ALP BLD-CCNC: 115 U/L
ALT SERPL-CCNC: 26 U/L
ANION GAP SERPL CALC-SCNC: 12 MMOL/L
APPEARANCE: CLEAR
AST SERPL-CCNC: 23 U/L
BACTERIA: NEGATIVE
BASOPHILS # BLD AUTO: 0.02 K/UL
BASOPHILS NFR BLD AUTO: 0.3 %
BILIRUB SERPL-MCNC: 0.4 MG/DL
BILIRUBIN URINE: NEGATIVE
BLOOD URINE: NEGATIVE
BUN SERPL-MCNC: 13 MG/DL
CALCIUM SERPL-MCNC: 10.2 MG/DL
CHLORIDE SERPL-SCNC: 108 MMOL/L
CO2 SERPL-SCNC: 22 MMOL/L
COLOR: YELLOW
CREAT SERPL-MCNC: 0.94 MG/DL
EOSINOPHIL # BLD AUTO: 0.07 K/UL
EOSINOPHIL NFR BLD AUTO: 1.2 %
FOLATE SERPL-MCNC: 9.6 NG/ML
GLUCOSE QUALITATIVE U: NEGATIVE
GLUCOSE SERPL-MCNC: 91 MG/DL
HBV CORE IGG+IGM SER QL: REACTIVE
HCT VFR BLD CALC: 45.5 %
HCV AB SER QL: NONREACTIVE
HCV S/CO RATIO: 0.12 S/CO
HEPATITIS A IGG ANTIBODY: NONREACTIVE
HGB BLD-MCNC: 15.2 G/DL
HIV1+2 AB SPEC QL IA.RAPID: NONREACTIVE
HYALINE CASTS: 1 /LPF
IMM GRANULOCYTES NFR BLD AUTO: 0.3 %
KETONES URINE: NEGATIVE
LEUKOCYTE ESTERASE URINE: NEGATIVE
LYMPHOCYTES # BLD AUTO: 2.87 K/UL
LYMPHOCYTES NFR BLD AUTO: 49.3 %
MAN DIFF?: NORMAL
MCHC RBC-ENTMCNC: 30.4 PG
MCHC RBC-ENTMCNC: 33.4 GM/DL
MCV RBC AUTO: 91 FL
MICROSCOPIC-UA: NORMAL
MONOCYTES # BLD AUTO: 0.4 K/UL
MONOCYTES NFR BLD AUTO: 6.9 %
NEUTROPHILS # BLD AUTO: 2.44 K/UL
NEUTROPHILS NFR BLD AUTO: 42 %
NITRITE URINE: NEGATIVE
PH URINE: 8
PLATELET # BLD AUTO: 175 K/UL
POTASSIUM SERPL-SCNC: 4.3 MMOL/L
PROT SERPL-MCNC: 6.9 G/DL
PROTEIN URINE: NEGATIVE
PSA SERPL-MCNC: 4.77 NG/ML
RBC # BLD: 5 M/UL
RBC # FLD: 15.9 %
RED BLOOD CELLS URINE: 2 /HPF
SODIUM SERPL-SCNC: 142 MMOL/L
SPECIFIC GRAVITY URINE: 1.02
SQUAMOUS EPITHELIAL CELLS: 0 /HPF
T PALLIDUM AB SER QL IA: NEGATIVE
UROBILINOGEN URINE: NORMAL
VIT B12 SERPL-MCNC: 668 PG/ML
WBC # FLD AUTO: 5.82 K/UL
WHITE BLOOD CELLS URINE: 0 /HPF

## 2019-05-17 PROCEDURE — 99214 OFFICE O/P EST MOD 30 MIN: CPT

## 2019-05-17 PROCEDURE — 72040 X-RAY EXAM NECK SPINE 2-3 VW: CPT

## 2019-05-17 PROCEDURE — 72100 X-RAY EXAM L-S SPINE 2/3 VWS: CPT

## 2019-05-17 NOTE — PHYSICAL EXAM
[Cane] : ambulates with cane [FreeTextEntry2] : His incisions are healed. Stable strength and sensation bilateral upper and lower extremities [de-identified] : AP lateral cervical x-rays demonstrates instrument at C4-T1 fusion\par \par AP lateral lumbar x-rays reveals evidence of a thoracic decompression

## 2019-05-17 NOTE — DISCUSSION/SUMMARY
[de-identified] : He does report his numbness is improving. We will also try some gabapentin. She will continue with his exercises. He has cut down on smoking a significant amount. Followup in 6 weeks.

## 2019-05-17 NOTE — HISTORY OF PRESENT ILLNESS
[All Other ROS Normal] : All other review of systems are negative except as noted [All Hx] : past medical, family, and social [All] : medication and allergy [FreeTextEntry1] : s/p C4-T1 fusion T9-11 laminectomy [FreeTextEntry2] : Mr. Brandon presents to the office s/p C4-T1 fusion on 12/20/18.  He reports numbness and tingling in both legs but improved since surgery.  He has been working out his legs.

## 2019-05-20 LAB
C TRACH RRNA SPEC QL NAA+PROBE: NOT DETECTED
N GONORRHOEA RRNA SPEC QL NAA+PROBE: NOT DETECTED
SOURCE AMPLIFICATION: NORMAL

## 2019-05-22 ENCOUNTER — APPOINTMENT (OUTPATIENT)
Dept: ULTRASOUND IMAGING | Facility: HOSPITAL | Age: 59
End: 2019-05-22

## 2019-05-24 ENCOUNTER — APPOINTMENT (OUTPATIENT)
Dept: UROLOGY | Facility: CLINIC | Age: 59
End: 2019-05-24
Payer: COMMERCIAL

## 2019-05-24 VITALS
WEIGHT: 157 LBS | HEART RATE: 67 BPM | BODY MASS INDEX: 23.79 KG/M2 | SYSTOLIC BLOOD PRESSURE: 135 MMHG | RESPIRATION RATE: 16 BRPM | HEIGHT: 68 IN | OXYGEN SATURATION: 98 % | DIASTOLIC BLOOD PRESSURE: 80 MMHG

## 2019-05-24 PROCEDURE — 99213 OFFICE O/P EST LOW 20 MIN: CPT

## 2019-05-24 NOTE — PHYSICAL EXAM
[General Appearance - Well Developed] : well developed [General Appearance - Well Nourished] : well nourished [Normal Appearance] : normal appearance [General Appearance - In No Acute Distress] : no acute distress [Well Groomed] : well groomed [Abdomen Soft] : soft [Abdomen Tenderness] : non-tender [Costovertebral Angle Tenderness] : no ~M costovertebral angle tenderness [Urethral Meatus] : meatus normal [Scrotum] : the scrotum was normal [Urinary Bladder Findings] : the bladder was normal on palpation [Testes Mass (___cm)] : there were no testicular masses [No Prostate Nodules] : no prostate nodules [Edema] : no peripheral edema [Exaggerated Use Of Accessory Muscles For Inspiration] : no accessory muscle use [Respiration, Rhythm And Depth] : normal respiratory rhythm and effort [] : no respiratory distress [Oriented To Time, Place, And Person] : oriented to person, place, and time [Mood] : the mood was normal [Affect] : the affect was normal [Not Anxious] : not anxious [Normal Station and Gait] : the gait and station were normal for the patient's age [No Focal Deficits] : no focal deficits [No Palpable Adenopathy] : no palpable adenopathy

## 2019-05-24 NOTE — ASSESSMENT
[FreeTextEntry1] : We had a long discussion regarding PSA level. Different parameters including PSA velocity, age-adjusted PSA, free PSA, etc reviewed. Some studies have indicated that the absolute PSA level may be more accurate than the other parameters listed above. We discussed observation and repeat PSA, prostate biopsy and prostate or pelvic MRI. The false negative rate of MRI was discussed. Risks of biopsy discussed included but were not limited to bleeding, sepsis, bacteremia, urinary tract infection, erectile dysfunction, etc. How the procedure is performed and preparation with antibiotics and enema were discussed. Risks of observation and not proceeding with biopsy were reviewed. Patient was made aware that prostate cancer can exist at any PSA level. Potential complications of delayed prostate cancer diagnosis were discussed.\par pt believes psa increased again due to sexual activity \par will repeat with abstention \par f/u after prostate sono repeat psa

## 2019-05-24 NOTE — HISTORY OF PRESENT ILLNESS
[FreeTextEntry1] : Elevated PSA \par Patient is here with an elevated PSA. He has no personal history and no family history of prostate cancer.He has no prior genitourinary history of hematuria, hematospermia, prostatitis, UTI, erectile dysfunction, urolithiasis, epididymal orchitis. \par No dysuria or hematuria\par psawas 7 repeat  3.89 now 4.77\par \par

## 2019-06-07 LAB
PSA FREE FLD-MCNC: 17 %
PSA FREE SERPL-MCNC: 0.72 NG/ML
PSA SERPL-MCNC: 4.34 NG/ML

## 2019-06-19 NOTE — DISCHARGE NOTE ADULT - ADDITIONAL INSTRUCTIONS
Hide Additional Notes?: No Detail Level: Detailed Follow-up Dr. Campo in 2 weeks' time for routine post-operative visit

## 2019-06-21 ENCOUNTER — APPOINTMENT (OUTPATIENT)
Dept: ORTHOPEDIC SURGERY | Facility: CLINIC | Age: 59
End: 2019-06-21
Payer: COMMERCIAL

## 2019-06-21 VITALS
HEIGHT: 68 IN | SYSTOLIC BLOOD PRESSURE: 134 MMHG | WEIGHT: 158 LBS | BODY MASS INDEX: 23.95 KG/M2 | HEART RATE: 59 BPM | DIASTOLIC BLOOD PRESSURE: 86 MMHG | OXYGEN SATURATION: 95 %

## 2019-06-21 PROCEDURE — 99214 OFFICE O/P EST MOD 30 MIN: CPT

## 2019-06-21 NOTE — PHYSICAL EXAM
[Cane] : ambulates with cane [FreeTextEntry2] : His incisions are healed. Stable strength and sensation bilateral upper and lower extremities [de-identified] : AP lateral cervical x-rays demonstrates instrument at C4-T1 fusion\par \par AP lateral lumbar x-rays reveals evidence of a thoracic decompression

## 2019-06-26 ENCOUNTER — CHART COPY (OUTPATIENT)
Age: 59
End: 2019-06-26

## 2019-06-26 ENCOUNTER — APPOINTMENT (OUTPATIENT)
Dept: INTERNAL MEDICINE | Facility: CLINIC | Age: 59
End: 2019-06-26
Payer: COMMERCIAL

## 2019-06-26 VITALS
SYSTOLIC BLOOD PRESSURE: 126 MMHG | TEMPERATURE: 98 F | WEIGHT: 157 LBS | BODY MASS INDEX: 23.79 KG/M2 | HEART RATE: 60 BPM | DIASTOLIC BLOOD PRESSURE: 78 MMHG | HEIGHT: 68 IN | OXYGEN SATURATION: 96 %

## 2019-06-26 PROCEDURE — 99214 OFFICE O/P EST MOD 30 MIN: CPT

## 2019-06-26 NOTE — ASSESSMENT
[FreeTextEntry1] : pt has lower back pain and cant afford pt right now and is feeling well.  He will continue gabapentin and try to exercise at home. He will be losing his insurance till disability kicks in.  he is putting in for permanent disability.  He is applying for ssd.  he will try to get his pension.  He has also decided not to take Truvada for  hiv prevention after he read the risks of the medications.    He is still smoking.  He wants to try the patches  .  He will restart with step two.

## 2019-06-26 NOTE — HISTORY OF PRESENT ILLNESS
[FreeTextEntry1] : fu  [de-identified] : Pt has fu with Dr Campo and still has some numbness .  He has been having lower back pain.  He has herniated ls disc an d thoracic spinal stenosis as well and will try to exercise Pt.  is too expensive for him right now.  he wanted a cpe but is not due till Sept.  He is otherwise feeling well.

## 2019-06-26 NOTE — PHYSICAL EXAM
[No Acute Distress] : no acute distress [Well Nourished] : well nourished [Well Developed] : well developed [Well-Appearing] : well-appearing [Normal Sclera/Conjunctiva] : normal sclera/conjunctiva [Normal Oropharynx] : the oropharynx was normal [PERRL] : pupils equal round and reactive to light [Supple] : supple [No Respiratory Distress] : no respiratory distress  [No Accessory Muscle Use] : no accessory muscle use [Clear to Auscultation] : lungs were clear to auscultation bilaterally [Normal Rate] : normal rate  [Normal Percussion] : the chest was normal to percussion [Regular Rhythm] : with a regular rhythm [No Edema] : there was no peripheral edema [No Extremity Clubbing/Cyanosis] : no extremity clubbing/cyanosis [Normal S1, S2] : normal S1 and S2 [Soft] : abdomen soft [Non Tender] : non-tender [Non-distended] : non-distended [Normal Bowel Sounds] : normal bowel sounds [No Masses] : no abdominal mass palpated [No HSM] : no HSM [No CVA Tenderness] : no CVA  tenderness [No Joint Swelling] : no joint swelling [No Rash] : no rash [Grossly Normal Strength/Tone] : grossly normal strength/tone [No Skin Lesions] : no skin lesions [Normal Insight/Judgement] : insight and judgment were intact [Normal Affect] : the affect was normal [de-identified] : walks with can

## 2019-08-01 ENCOUNTER — APPOINTMENT (OUTPATIENT)
Dept: INTERNAL MEDICINE | Facility: CLINIC | Age: 59
End: 2019-08-01

## 2020-01-10 NOTE — ASU PATIENT PROFILE, ADULT - NS PRO TALK SOMEONE YN
Presents for follow-up after his encounter with bronchitis. He does have a history of longer standing reactive airways disease.   He feels much better he is completed a Z-Brian and an injection of Rocephin and is still taking his inhalers today's exam vital
no

## 2021-10-11 NOTE — OCCUPATIONAL THERAPY INITIAL EVALUATION ADULT - UPPER BODY DRESSING, PREVIOUS LEVEL OF FUNCTION, OT EVAL
ED Attending Physician Note - Resident Supervision        Patient : Cherelle Liu Age: 21 year old Sex: female  MRN: [id number] Encounter Date: 10/10/2021    Patient was seen in conjunction with the resident physician.  I performed an independent evaluation including history and physical with the resident physician or after the patient was seen by the resident physician. For further details about elements of the patient encounter including PMH, Social History, Family History, and ROS, see the resident note, and I agree with his/her documentation and care except as noted.       Note - any recorded times are estimates and are as accurate as possible    HPI:  20-year-old woman presents for nausea vomiting diarrhea and abdominal pain.  Symptoms started this morning.  Nonbloody nonbilious vomiting.  No blood in the stool.  Generalized crampy migratory abdominal pain.  Yesterday she reports having some hives after eating pasta with seafood.  This resolved without intervention.    No known sick contacts.  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  Does have a history of GI upset after eating large amounts of seafood but this is not what she had last night.    PMH:  Denies    ROS: all systems reviewed and otherwise negative     PMH/Soc Hx/Fam Hx: see HPI    Physical Exam:   General:  Alert, appears comfortable.    Skin:  Normal for ethnicity.   Eye:  Normal conjunctiva.   Ears, nose, mouth and throat:  deferred 2/2 COVID pandemic  Cardiovascular:  Regular  rate and rhythm, 2+ radial pulses  Respiratory:   respirations are non-labored. No conversational dyspnea  Chest wall: there is no tenderness and no deformity  Gastrointestinal: abdomen is soft, nontender, nondistended, there are no hernias palpable  Musculoskeletal:  No deformity, normal tone  Neurological:  speech is clear, no focal weakness or numbness.      Psychiatric:  Cooperative, appropriate mood & affect.       Notable Work up/Results:                   -Labs -mild elevation in transaminases and bilirubin labs otherwise unremarkable                  -Imaging -   XR CHEST PA OR AP 1 VIEW   Final Result       No acute pulmonary process.         Electronically Signed by: OLAMIDE HAAS M.D.    Signed on: 10/10/2021 7:29 PM          US GALLBLADDER   Final Result      Negative abdominal ultrasound      Electronically Signed by: OLAMIDE HAAS M.D.    Signed on: 10/10/2021 7:08 PM                                             MDM and ED Course:    ED Medication Orders (From admission, onward)    Ordered Start     Status Ordering Provider    10/10/21 1657 10/10/21 1700  diphenhydrAMINE (BENADRYL) injection 25 mg  ONCE         Last MAR action: Given MARGY JAMES    10/10/21 1657 10/10/21 1700  famotidine (PEPCID) injection 20 mg  ONCE         Last MAR action: Given MARGY JAMES    10/10/21 1657 10/10/21 1700  ondansetron (ZOFRAN) injection 4 mg  ONCE         Last MAR action: Given MARGY JAMES    10/10/21 1657 10/10/21 1700  lactated ringers bolus 1,000 mL  ONCE         Last MAR action: Completed MARGY JAMES          Patient's presentation is most consistent with a viral gastroenteritis however given her history of what sounds like GI related allergic reaction to seafood treated her with famotidine and Benadryl as well.  Symptoms essentially resolved with treatment here.  Transaminases slightly elevated, and temperature slightly elevated although not febrile here therefore considered cholecystitis, right upper quadrant ultrasound is negative.  Patient does not have urinary symptoms that would prompt UA to be obtained. Her abd exam and reexam are benign and not c/w appendicitis, diverticulitis, or other dangerous intraabd pathology.  She tolerated p.o. and is safe for discharge home.    (PPE worn: Gloves, surgical cap, goggles, n95)    Diagnosis:   1. Infectious gastroenteritis    Disposition:   discharge    Plan of care discussed with the  patient who is in agreement and feels safe to be discharged. Symptoms that should prompt a return visit to the ED discussed with the patient who verbalized understanding. In addition, written discharge instructions have been provided to the patient. The patient was also encouraged to return for any new or concerning symptoms. All questions were answered and the patient was safely discharged.       Gilberto Larsen MD  10/10/21 1948     independent

## 2021-11-18 NOTE — PATIENT PROFILE ADULT - NUMBER OF YRS
Decollete Filler Volume In Cc: 0 Price (Use Numbers Only, No Special Characters Or $): 750.00 Consent: Written consent obtained. Risks include but not limited to bruising, beading, irregular texture, ulceration, infection, allergic reaction, scar formation, incomplete augmentation, temporary nature, procedural pain. Anesthesia Volume In Cc: 0.5 Include Cannula Information In Note?: No Vermilion Lips Filler Volume In Cc: 1 Additional Anesthesia Volume In Cc: 6 Map Statment: See Attach Map for Complete Details Anesthesia Type: 1% lidocaine with epinephrine Filler: RHA 4 Detail Level: Detailed Post-Care Instructions: Patient instructed to apply ice to reduce swelling. 40

## 2022-04-22 NOTE — ASU PATIENT PROFILE, ADULT - SURGERY TIME
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 About 30% of the anterior sphincter involved in the fistula. There rectovaginal septum feels normal with out obvious fistula, brown drainage in the vagina and palpable irregularity of the vaginal cuff. Office biopsy by GYN/ONC yesterday .  Complications: None.  Implants: * No implants in log *         09:00

## 2022-05-05 NOTE — PATIENT PROFILE ADULT - ANY IMPAIRED UPPER EXTREMITY FUNCTION WITHIN A WEEK PRIOR TO ADMISSION RELATED TO?
upper extremity orthopedic trauma/surgery Full Thickness Lip Wedge Repair (Flap) Text: Given the location of the defect and the proximity to free margins a full thickness wedge repair was deemed most appropriate.  Using a sterile surgical marker, the appropriate repair was drawn incorporating the defect and placing the expected incisions perpendicular to the vermilion border.  The vermilion border was also meticulously outlined to ensure appropriate reapproximation during the repair.  The area thus outlined was incised through and through with a #15 scalpel blade.  The muscularis and dermis were reaproximated with deep sutures following hemostasis. Care was taken to realign the vermilion border before proceeding with the superficial closure.  Once the vermilion was realigned the superfical and mucosal closure was finished.

## 2022-07-22 NOTE — END OF VISIT
[>50% of Time Spent on Counseling and Coordination of Care for  ___] : Greater than 50% of the encounter time was spent on counseling and coordination of care for [unfilled]
dislodge j-tube

## 2023-01-10 NOTE — OCCUPATIONAL THERAPY INITIAL EVALUATION ADULT - GENERAL OBSERVATIONS, REHAB EVAL
Pt received in semisupine position. + Cervical collar. + IV fluids. + Hemovac x 2. pending medical and cardiac clearance and lab reviews

## 2023-07-04 NOTE — CONSULT NOTE ADULT - PROBLEM SELECTOR RECOMMENDATION 9
s/p C4-T1 fusion, T9-12 laminectomy on 12/20.  Pain control on Dilaudid PCA.  SCD for VTE prophylaxis.  Surgical site management as per ortho.
no

## 2023-07-27 ENCOUNTER — LABORATORY RESULT (OUTPATIENT)
Age: 63
End: 2023-07-27

## 2023-07-27 ENCOUNTER — APPOINTMENT (OUTPATIENT)
Dept: INTERNAL MEDICINE | Facility: CLINIC | Age: 63
End: 2023-07-27
Payer: MEDICARE

## 2023-07-27 VITALS
SYSTOLIC BLOOD PRESSURE: 145 MMHG | WEIGHT: 156 LBS | OXYGEN SATURATION: 98 % | HEIGHT: 68 IN | HEART RATE: 86 BPM | BODY MASS INDEX: 23.64 KG/M2 | TEMPERATURE: 98 F | DIASTOLIC BLOOD PRESSURE: 77 MMHG

## 2023-07-27 DIAGNOSIS — M51.24 OTHER INTERVERTEBRAL DISC DISPLACEMENT, THORACIC REGION: ICD-10-CM

## 2023-07-27 DIAGNOSIS — Z91.89 OTHER SPECIFIED PERSONAL RISK FACTORS, NOT ELSEWHERE CLASSIFIED: ICD-10-CM

## 2023-07-27 DIAGNOSIS — Z00.00 ENCOUNTER FOR GENERAL ADULT MEDICAL EXAMINATION W/OUT ABNORMAL FINDINGS: ICD-10-CM

## 2023-07-27 DIAGNOSIS — R20.2 PARESTHESIA OF SKIN: ICD-10-CM

## 2023-07-27 DIAGNOSIS — H91.93 UNSPECIFIED HEARING LOSS, BILATERAL: ICD-10-CM

## 2023-07-27 PROCEDURE — G0402 INITIAL PREVENTIVE EXAM: CPT

## 2023-07-27 PROCEDURE — 93000 ELECTROCARDIOGRAM COMPLETE: CPT | Mod: 59

## 2023-07-27 PROCEDURE — G0438: CPT

## 2023-07-27 PROCEDURE — 82270 OCCULT BLOOD FECES: CPT

## 2023-07-27 PROCEDURE — 83014 H PYLORI DRUG ADMIN: CPT

## 2023-07-27 PROCEDURE — 99214 OFFICE O/P EST MOD 30 MIN: CPT | Mod: 25

## 2023-07-27 RX ORDER — VITAMIN K2 90 MCG
125 MCG CAPSULE ORAL
Qty: 1 | Refills: 2 | Status: COMPLETED | COMMUNITY
Start: 2019-05-17 | End: 2023-07-27

## 2023-07-27 RX ORDER — GABAPENTIN 300 MG/1
300 CAPSULE ORAL 3 TIMES DAILY
Qty: 270 | Refills: 0 | Status: COMPLETED | COMMUNITY
Start: 2019-05-17 | End: 2023-07-27

## 2023-07-27 RX ORDER — NICOTINE TRANSDERMAL SYSTEM 14 MG/24H
14 PATCH, EXTENDED RELEASE TRANSDERMAL DAILY
Qty: 45 | Refills: 0 | Status: COMPLETED | COMMUNITY
Start: 2018-11-20 | End: 2023-07-27

## 2023-07-27 NOTE — ASSESSMENT
[FreeTextEntry1] : health  he needs eye dental  bone density,  psa,  colon cancer screening,   \par 2  bmi  23  Weight loss, exercise, and diet control were discussed and are highly encouraged. Treatment options were given such as, aqua therapy, and contacting a nutritionist. Recommended to use the elliptical, stationary bike, less use of treadmill. Mindful eating was explained to the patient Obesity is associated with worsening asthma, shortness of breath, and potential for cardiac disease, diabetes, and other underlying medical conditions.\par 3  smoking   The immune system is the body’s way of protecting itself from infection and disease. Smoking compromises the immune system, making smokers more likely to have respiratory infections.\par \par Smoking also causes several autoimmune diseases, including Crohn’s disease and rheumatoid arthritis. It may also play a role in periodic flare-ups of signs and symptoms of autoimmune diseases. Smoking doubles your risk of developing rheumatoid arthritis.\par \par Smoking has recently been linked to type 2 diabetes, also known as adult-onset diabetes. Smokers are 30% to 40% more likely to develop type 2 diabetes than nonsmokers. Additionally, the more cigarettes an individual smokes, the higher the risk for diabetes.\par \par Back to top\par \par \par How does smoking affect my bones? \par \par Recent studies show a direct relationship between tobacco use and decreased bone density. Smoking is one of many factors—including weight, alcohol consumption, and activity level—that increase your risk for osteoporosis, a condition in which bones weaken and become more likely to fracture.\par \par Significant bone loss has been found in older women and men who smoke. Quitting smoking appears to reduce the risk for low bone mass and fractures. However, it may take several years to lower a former smoker’s risk.\par \par In addition, smoking from an early age puts women at even higher risk for osteoporosis. Smoking lowers the level of the hormone estrogen in your body, which can cause you to go through menopause earlier, boosting your risk for osteoporosis.\par \par Back to top\par \par \par How does smoking affect my heart and blood vessels? \par \par The chemicals in tobacco smoke harm your blood cells and damage the function of your heart. This damage increases your risk for:\par •Atherosclerosis, a disease in which a waxy substance called plaque builds up in your arteries\par •Aneurysms, which are bulging blood vessels that can burst and cause death\par •Cardiovascular disease (CVD), which includes: ?Coronary heart disease (CHD), narrow or blocked arteries around the heart\par ?Heart attack and damage to your arteries\par ?Heart-related chest pain\par ?High blood pressure\par \par •Coronary Heart disease, where platelets—components in the blood—stick together along with proteins for form clots which can then get stuck in the plaque in the walls of arteries and cause heart attacks\par •Peripheral arterial disease (PAD), a condition in which plaque builds up in the arteries that carry blood to the head, organs, and limbs\par •Stroke, which is sudden death of brain cells caused by blood clots or bleeding\par \par Breathing tobacco smoke can even change your blood chemistry and damage your blood vessels. As you inhale smoke, cells that line your body’s blood vessels react to its chemicals. Your heart rate and blood pressure go up and your blood vessels thicken and narrow.\par \par Back to top\par \par \par How does smoking affect my lungs and breathing? \par \par Every cigarette you smoke damages your breathing and scars your lungs. Smoking causes:\par •Chronic obstructive pulmonary disease (COPD), a disease that gets worse over time and causes wheezing, shortness of breath, chest tightness, and other symptoms\par •Emphysema, a condition in which the walls between the air sacs in your lungs lose their ability to stretch and shrink back. Your lung tissue is destroyed, making it difficult or impossible to breathe.\par •Chronic bronchitis, which causes swelling of the lining of your bronchial tubes. When this happens, less air flows to and from your lungs.\par •Pneumonia\par •Asthma\par •Tuberculosis \par \par People with asthma can suffer severe attacks when around cigarette smoke.\par \par Back to top\par \par \par Can smoking affect my vision? \par \par Smoking is as bad for your eyes as it is for the rest of your body. Research has linked smoking to an increased risk of developing age-related macular degeneration, cataract, and optic nerve damage, all of which can lead to blindness.\par \par Back to top\par \par \par Do cigarettes cause cancer? \par \par Tobacco smoke contains more than 7,000 chemicals. About 70 of them are known to cause cancer. Smoking cigarettes is the number-one risk factor for lung cancer. But, smoking can affect your entire body, and is known to cause cancer in the:\par •Lungs\par •Trachea\par •Bronchus\par •Esophagus\par •Oral Cavity\par •Lip\par •Nasopharynx\par •Nasal Cavity\par •Larynx\par •Stomach\par •Bladder\par •Pancreas\par •Kidney\par •Liver\par •Uterine Cervix\par •Colon\par •Rectum\par \par  In addition, smoking is known to cause leukemia.\par \par Learn more about cigarettes.\par \par Back to top\par \par \par Do light cigarettes cause cancer? \par \par There is no such thing as a safe cigarette. People who smoke any kind of cigarette are at an increased risk for smoking-related diseases. Although it is no longer legal to sell light cigarettes, people who smoked light cigarettes in the past are likely to have inhaled the same amount of toxic chemicals as those who smoked regular cigarettes. They remain at high risk of developing smoking-related cancers and other diseases. Learn more about light cigarettes.\par \par Back to top\par \par \par Do menthol cigarettes cause cancer? \par \par All cigarettes are harmful, including menthol cigarettes. Many smokers think menthol cigarettes are less harmful, but there is no evidence that menthol cigarettes are safer than other cigarettes. Like other cigarettes, menthol cigarettes harm nearly every organ in the body and cause many diseases, including cancer, cardiovascular diseases, and respiratory diseases. Menthol cigarettes, like other cigarettes, also negatively impact male and female fertility and are harmful to pregnant women and their unborn babies.\par \par Some research shows that menthol cigarettes may be more addictive than non-menthol cigarettes. More research is needed to understand how addiction differs between menthol and non-menthol cigarette use. Learn more about menthol cigarettes.\par \par Back to top\par \par \par Can smoking cigars and pipes cause cancer? \par \par Cigar and pipe smoke, like cigarette smoke, contains toxic and cancer-causing chemicals that are harmful to both smokers and non-smokers. Cigar and pipe smoking causes:\par •Bladder cancer\par •Esophageal cancer\par •Laryngeal (voice box) cancer\par •Lip cancer\par •Lung cancer\par •Mouth cancer\par •Throat cancer\par •Tongue cancer  rfer for ct screning  pulmonary evaluation with pft \par 4. hearing loss refert to ent   \par 5.  leg pain claudication reduced  pulsation  refer to vascular  \par 6.  elevated psa enlarged  prostate and luts   refer to urologist  and psa  ordered ua.   has luts    prostateenlarge\par \par 7.  abn ecg   ekg  51  sinus bradycardia  with pvc  t wave ab  inferior  ischemia    qrs  90 ms  qt  426/ 392ms pr  150 ms p  118  refer to   cardiologist  \par 8. lumbar radiculopathy  refer back to Dr Campo  for  evaluation  Unless acute low back pain is caused by a serious medical condition (which is uncommon), it typically resolves fairly quickly, even if there is a bulging or herniated disc.\par Still, low back pain can make it hard to do your usual activities, and it can be frustrating to feel like you just have to wait for it to get better. Below are some simple things you can do that may help relieve your pain.\par Remaining active — Many people are afraid that they will hurt their back further or delay recovery by remaining active. However, remaining active, to the extent that you are able, is one of the best things you can do for your back.\par If you have severe pain, you may need to rest your back for a day or so. It may be most comfortable to lie on your back with a pillow under your knees and your head and shoulders elevated. For sleeping, you may want to lie on your side with your upper knee bent and a pillow between your knees. However, prolonged bed rest is not recommended. Studies have shown that people with low back pain recover faster when they remain active. Movement helps to relieve muscle spasms and prevents loss of muscle strength.\par While you should avoid strenuous activities and sports while you are in pain, it is fine to continue doing regular day-to-day activities and light exercises, such as walking. If certain activities cause your back to hurt too much, try something else instead.\par Heat — Using a heating pad or heated wrap can help with low back pain during the first few weeks. It is not clear if cold helps as well, but some people may find that it relieves pain temporarily.\par Modifications at work — Most experts recommend that people with low back pain continue to work so long as it is possible to avoid prolonged standing or sitting and heavy lifting. If your job does not allow you to sit or stand comfortably, you may need to take some time off work while you recover. While standing at work, stepping on a block of wood with one foot (and periodically alternating the foot on the block) may be helpful.\par Pain medications — You can try taking an over-the-counter medication to help relieve pain. Nonsteroidal antiinflammatory drugs (NSAIDs), such as ibuprofen (sample brand names: Advil, Motrin) and naproxen (brand name: Aleve), may work better than acetaminophen (brand name: Tylenol) for low back pain.\par If you do take pain medication, it may be more effective to take a dose on a regular basis for three to five days, rather than using the medication only when your pain becomes unbearable. Muscle relaxants (eg, cyclobenzaprine [brand name: Flexeril]) are prescription medications; while these may help relieve back pain, they can cause drowsiness and are probably no better than ibuprofen in relieving pain. Your health care provider can talk to you about whether muscle relaxants might help in your situation. They may be helpful before bedtime when used for a short time, ie, a week or two. People who need to be alert, such as while driving or operating machinery, should not use muscle relaxants.\par Opioids (drugs derived from morphine) are not recommended for most people with back pain. In rare situations, a health care provider might prescribe them for a few days if a person has severe pain that is not responding to other treatments, but they are generally not much more effective than NSAIDs. In addition, opioids have a relatively high risk of side effects and the potential to cause harm, including the risk of dependency and abuse.\par Exercise — Starting a new exercise program immediately after a new episode of low back pain will not speed recovery from the acute episode. However, there is evidence that exercise is beneficial in people with chronic back pain. Spinal manipulation — "Spinal manipulation" is a technique sometimes used by chiropractors, physical therapists, osteopaths, massage therapists, and others to relieve back pain. It involves moving the joints of the spine beyond the normal range of motion. Studies suggest that spinal manipulation may provide modest pain relief and improved function, and it generally appears to be safe if performed by an experienced professional. If you are interested in trying this approach, talk with your health care provider about how to integrate it into your treatment plan.\par Acupuncture — Acupuncture involves inserting very fine needles into specific points, as determined by traditional Chinese maps of the body's flow of energy. There is not consistent evidence that acupuncture is effective for people with acute low back pain; however, some people find it helpful.\par Massage — There is no evidence that massage is effective in treating acute low back pain. However, you may find that it is generally relaxing and helps you feel better temporarily.\par Psychological therapy — In some cases, mental health issues can contribute to low back pain. Psychological therapy has mostly been studied in the context of treating longer-term (chronic) back pain; however, it may be beneficial for some people with acute pain as well. Other treatments — You may have heard of other treatments that claim to relieve back pain. Unfortunately, most of these have not been proven to work or are only effective in specific situations. Examples include:\par ?Injections – Some clinicians recommend injections of a local anesthetic (numbing medication) into the soft tissues of the back, although it is not clear if these injections are effective. The areas targeted by these injections are called "trigger points." Trigger-point injections may be of benefit in some people with chronic back pain, but they are typically not recommended for treating acute pain.\par Injections of a glucocorticoid (steroid) medication are sometimes recommended for people with chronic low back pain with sciatica or radiculopathy The injection is given into the epidural space, which is located below the spinal cord. Epidural glucocorticoid injections do appear to improve pain slightly at two and six weeks after the injection, but not at 3, 6, or 12 months after the injection. There is no evidence that epidural steroid injections are helpful for people with back pain without sciatica.\par ?Corsets and braces – While wearable supportive garments may claim to help relieve or prevent low back pain, these are typically not effective.\par ?Traction – Traction involves the use of weights to realign or pull the spinal column into alignment. Clinical studies have shown no benefit from traction in the treatment of acute back pain.\par ?Switching to a firmer mattress – People often wonder if sleeping on a firmer mattress can help prevent or treat low back pain. Small studies have suggested that using a less firm mattress may actually be more likely to relieve pain; however, there is not enough evidence to support switching to a specific type of sleeping surface for this reason.\par ?Methods involving energy or electricity – Other interventions include ultrasound, interferential therapy, shortwave diathermy, transcutaneous electrical nerve stimulation, and low-level laser therapy, all of which involve applying energy to the skin's surface. None of these interventions have been proven to be effective, particularly during the first four to six weeks of an episode of back pain.\par CHRONIC LOW BACK PAIN TREATMENTWhile most people recover completely from an episode of acute low back pain, some people do go on to have longer-term pain. Chronic pain is typica\par \par \par \par

## 2023-07-27 NOTE — HEALTH RISK ASSESSMENT
[Fair] :  ~his/her~ mood as fair [Yes] : Yes [2 - 4 times a month (2 pts)] : 2-4 times a month (2 points) [1 or 2 (0 pts)] : 1 or 2 (0 points) [Never (0 pts)] : Never (0 points) [No] : In the past 12 months have you used drugs other than those required for medical reasons? No [No falls in past year] : Patient reported no falls in the past year [0] : 2) Feeling down, depressed, or hopeless: Not at all (0) [PHQ-2 Negative - No further assessment needed] : PHQ-2 Negative - No further assessment needed [Patient reported bone density results were abnormal] : Patient reported bone density results were abnormal [Patient reported colonoscopy was abnormal] : Patient reported colonoscopy was abnormal [HIV Test offered] : HIV Test offered [Hepatitis C test offered] : Hepatitis C test offered [None] : None [With Significant Other] : lives with significant other [# of Members in Household ___] :  household currently consist of [unfilled] member(s) [Retired] : retired [College] : College [Single] : single [# Of Children ___] : has [unfilled] children [Sexually Active] : sexually active [Feels Safe at Home] : Feels safe at home [Fully functional (bathing, dressing, toileting, transferring, walking, feeding)] : Fully functional (bathing, dressing, toileting, transferring, walking, feeding) [Fully functional (using the telephone, shopping, preparing meals, housekeeping, doing laundry, using] : Fully functional and needs no help or supervision to perform IADLs (using the telephone, shopping, preparing meals, housekeeping, doing laundry, using transportation, managing medications and managing finances) [Reports changes in hearing] : Reports changes in hearing [Smoke Detector] : smoke detector [Carbon Monoxide Detector] : carbon monoxide detector [Safety elements used in home] : safety elements used in home [Seat Belt] :  uses seat belt [Name: ___] : Health Care Proxy's Name: [unfilled]  [Relationship: ___] : Relationship: [unfilled] [Current] : Current [20 or more] : 20 or more [FreeTextEntry1] : see above  [Audit-CScore] : 2 [de-identified] : smokes  marijuana daily  [de-identified] : none   [de-identified] : none  [[id number]] : 0 [Change in mental status noted] : No change in mental status noted [Language] : denies difficulty with language [Behavior] : denies difficulty with behavior [Learning/Retaining New Information] : denies difficulty learning/retaining new information [Handling Complex Tasks] : denies difficulty handling complex tasks [Reasoning] : denies difficulty with reasoning [Spatial Ability and Orientation] : denies difficulty with spatial ability and orientation [Reports changes in vision] : Reports no changes in vision [Reports changes in dental health] : Reports no changes in dental health [Guns at Home] : no guns at home [Sunscreen] : does not use sunscreen [Travel to Developing Areas] : does not  travel to developing areas [TB Exposure] : is not being exposed to tuberculosis [BoneDensityDate] : 10/26/18 [Caregiver Concerns] : does not have caregiver concerns [BoneDensityComments] : osteopenia   [ColonoscopyDate] : 11/16/18 [de-identified] : decreased [ColonoscopyComments] : hyperplastic  colon polyps  [de-identified] : last  eye exam 2017 [de-identified] : last dental  2017 [AdvancecareDate] : 07/27/23

## 2023-07-27 NOTE — HISTORY OF PRESENT ILLNESS
[FreeTextEntry1] :  reestablishing medical care  [de-identified] : Pt is a  63 yr oild  man  who smokes cigarettes  since age  15  and smoked 1ppd  until two yrs ago and reduced to 1pk per week.  He  has cervical stenosis  enlarged prostate, sciatica , spondylosis with myelopathy  thoracic  spinal stenosis    and  claudication  who is  here  for   problems and  examination.  pt has been living  in [facility]   .  he states he has had tightness in his  mid section  constant   no abd pain , no change  in his bowl habits,  or  nausea or vomiting  or  rectal bleeding  or weight changes  or reflux.  he states he has  to urinate during the night  and  after urinating during day he has to go back to urinate a gain.    He has hx of  prior enlarged prostate and elevated psa  and saw urologist and never fu  .     He has a pause  when urinating  at times.       he  has  pain in his legs   and knee pain,   and feels  his  calfs have pressure  and in upper thighs which is constant.      He is  sp  s/p C4-T1 fusion and was seeing Dr Campo.    .

## 2023-07-27 NOTE — COUNSELING
[Fall prevention counseling provided] : Fall prevention counseling provided [Adequate lighting] : Adequate lighting [No throw rugs] : No throw rugs [Use proper foot wear] : Use proper foot wear [Use recommended devices] : Use recommended devices [Sleep ___ hours/day] : Sleep [unfilled] hours/day [Engage in a relaxing activity] : Engage in a relaxing activity [Plan in advance] : Plan in advance [____ min/wk Activity] : [unfilled] min/wk activity [FreeTextEntry2] : bmi 23  ideal  143- 164 he is  156

## 2023-07-27 NOTE — PHYSICAL EXAM
[Well Developed] : well developed [Well Nourished] : well nourished [Normal Voice Quality] : was normal [Normal Verbal Skills] : the patient had normal verbal communication skills [Normal Nonverbal Skills] : normal nonverbal communication skills were demonstrated [Conjunctiva] : the conjunctiva were normal in both eyes [PERRL] : pupils were equal in size, round, and reactive to light [EOM Intact] : extraocular movements were intact [Cataract Right Eye] : a cataract was noted in the right eye [Cataract Left Eye] : a cataract was noted in the left eye [Normal Oropharynx] : the oropharynx was normal [Normal TMs] : both tympanic membranes were normal [Normal Nasal Mucosa] : the nasal mucosa was normal [Normal Appearance] : was normal in appearance [Neck Supple] : was supple [Rate ___] : at [unfilled] breaths per minute [Normal Rhythm/Effort] : normal respiratory rhythm and effort [Clear Bilaterally] : the lungs were clear to auscultation bilaterally [Normal to Percussion] : the lungs were normal to percussion [5th Left ICS - MCL] : palpated at the 5th LICS in the midclavicular line [Normal Rate] : normal [Heart Rate ___] : [unfilled] bpm [Bradycardia] : bradycardic [Normal S1] : normal S1 [Normal S2] : normal S2 [No Murmur] : no murmurs heard [No Pitting Edema] : no pitting edema present [2+] : right 2+ [1+] : left 1+ [No Abnormalities] : the abdominal aorta was not enlarged and no bruit was heard [Examination Of The Breasts] : a normal appearance [No Discharge] : no discharge [Flat] : flat [Soft, Nontender] : the abdomen was soft and nontender [No HSM] : no hepatosplenomegaly noted [Abdomen Hernia Umbilical] : an umbilical hernia was present [] : which was reducible [None] : no CVA tenderness [Normal rectal exam] : was normal [Stool Sample Taken] : a stool sample was obtained [Penis Abnormality] : normal circumcised penis [Testes Tenderness] : no tenderness of the testes [Prostate Enlarged] : was enlarged [No Lymphangitis] : no lymphangitis observed [No Visual Abnormalities] : no visible abnormalities [Normal Lordosis] : normal lordosis [No Scoliosis] : no scoliosis [No Tenderness to Palpation] : no spine tenderness on palpation [No Masses] : no masses [Full ROM] : full ROM [No Pain with ROM] : no pain with motion in any direction [Intact] : all reflexes within normal limits bilaterally [Normal Station and Gait] : the gait and station were normal [Normal Motor Tone] : the muscle tone was normal [Involuntary Movements] : no involuntary movements were seen [Normal Scalp] : inspection of the scalp showed no abnormalities [Examination Of The Hair] : texture and distribution of hair was normal [Complexion Dark] : dark complexion [Normal] : the deep tendon reflexes were normal [Normal Mental Status] : the patient's orientation, memory, attention, language and fund of knowledge were normal [Appropriate] : appropriate [Enlarged Diffusely] : was not enlarged [JVP Elevated ___cm] : the JVP was not elevated [S3] : no S3 [S4] : no S4 [Rt] : no varicose veins of the right leg [Lt] : no varicose veins of the left leg [Right Carotid Bruit] : no bruit heard over the right carotid [Left Carotid Bruit] : no bruit heard over the left carotid [Right Femoral Bruit] : no bruit heard over the right femoral artery [Left Femoral Bruit] : no bruit heard over the left femoral artery [Bruit] : no bruit heard [Occult Blood Positive] : was negative for occult blood [Gross Blood] : no gross blood [Fecal Impaction] : no fecal impaction was present [Prostate Tenderness] : was not tender [Prostate Fluctuant] : was not fluctuant [Postauricular Lymph Nodes Enlarged Bilaterally] : nodes not enlarged [Preauricular Lymph Nodes Enlarged Bilaterally] : nodes not enlarged [Submandibular Lymph Nodes Enlarged Bilaterally] : nodes not enlarged [Suboccipital Lymph Nodes Enlarged Bilaterally] : nodes not enlarged [Submental Lymph Nodes Enlarged] : nodes not enlarged [Cervical Lymph Nodes Enlarged Posterior Bilaterally] : nodes not enlarged [Cervical Lymph Nodes Enlarged Anterior Bilaterally] : nodes not enlarged [Supraclavicular Lymph Nodes Enlarged Bilaterally] : nodes not enlarged [Axillary Lymph Nodes Enlarged Bilaterally] : nodes not enlarged [Epitrochlear Lymph Nodes Enlarged Bilaterally] : nodes not enlarged [Femoral Lymph Nodes Enlarged Bilaterally] : nodes not enlarged [Inguinal Lymph Nodes Enlarged Bilaterally] : nodes not enlarged [Abnormal Color] : normal color and pigmentation [Skin Lesions 1] : no skin lesions were observed [Skin Turgor Decreased] : normal skin turgor [Impaired judgment] : intact judgment [Impaired Insight] : intact insight [de-identified] : tongue normal  teeth  gum disease   tar build up

## 2023-07-27 NOTE — REVIEW OF SYSTEMS
[Hearing Loss] : hearing loss [Abdominal Pain] : abdominal pain [Hesitancy] : hesitancy [Nocturia] : nocturia [Frequency] : frequency [Back Pain] : back pain [Unsteady Walk] : ataxia [Negative] : Heme/Lymph [Headache] : no headache [Dizziness] : no dizziness [Fainting] : no fainting [Memory Loss] : no memory loss

## 2023-07-31 DIAGNOSIS — R20.0 ANESTHESIA OF SKIN: ICD-10-CM

## 2023-07-31 LAB
25(OH)D3 SERPL-MCNC: 29.3 NG/ML
ALBUMIN SERPL ELPH-MCNC: 5.1 G/DL
ALP BLD-CCNC: 130 U/L
ALT SERPL-CCNC: 23 U/L
ANION GAP SERPL CALC-SCNC: 13 MMOL/L
APPEARANCE: CLEAR
AST SERPL-CCNC: 21 U/L
BILIRUB SERPL-MCNC: 0.4 MG/DL
BILIRUBIN URINE: NEGATIVE
BLOOD URINE: NEGATIVE
BUN SERPL-MCNC: 15 MG/DL
C TRACH RRNA SPEC QL NAA+PROBE: NOT DETECTED
CALCIUM SERPL-MCNC: 10.3 MG/DL
CHLORIDE SERPL-SCNC: 106 MMOL/L
CHOLEST SERPL-MCNC: 219 MG/DL
CO2 SERPL-SCNC: 23 MMOL/L
COLOR: YELLOW
CREAT SERPL-MCNC: 0.95 MG/DL
EGFR: 89 ML/MIN/1.73M2
GLUCOSE QUALITATIVE U: NEGATIVE MG/DL
GLUCOSE SERPL-MCNC: 91 MG/DL
HBV CORE IGG+IGM SER QL: REACTIVE
HBV SURFACE AB SER QL: REACTIVE
HBV SURFACE AG SER QL: NONREACTIVE
HCV AB SER QL: NONREACTIVE
HCV S/CO RATIO: 0.09 S/CO
HDLC SERPL-MCNC: 66 MG/DL
HEPATITIS A IGG ANTIBODY: NONREACTIVE
HIV1+2 AB SPEC QL IA.RAPID: NONREACTIVE
KETONES URINE: NEGATIVE MG/DL
LDLC SERPL CALC-MCNC: 127 MG/DL
LEUKOCYTE ESTERASE URINE: ABNORMAL
N GONORRHOEA RRNA SPEC QL NAA+PROBE: NOT DETECTED
NITRITE URINE: NEGATIVE
NONHDLC SERPL-MCNC: 154 MG/DL
PH URINE: 5.5
POTASSIUM SERPL-SCNC: 4.6 MMOL/L
PROT SERPL-MCNC: 7.2 G/DL
PROTEIN URINE: NEGATIVE MG/DL
PSA SERPL-MCNC: 4.2 NG/ML
SODIUM SERPL-SCNC: 142 MMOL/L
SOURCE AMPLIFICATION: NORMAL
SPECIFIC GRAVITY URINE: 1.02
T PALLIDUM AB SER QL IA: NEGATIVE
TRIGL SERPL-MCNC: 154 MG/DL
UREA BREATH TEST QL: POSITIVE
UROBILINOGEN URINE: 0.2 MG/DL

## 2023-08-02 ENCOUNTER — APPOINTMENT (OUTPATIENT)
Dept: INTERNAL MEDICINE | Facility: CLINIC | Age: 63
End: 2023-08-02
Payer: MEDICARE

## 2023-08-02 VITALS
DIASTOLIC BLOOD PRESSURE: 80 MMHG | HEART RATE: 63 BPM | TEMPERATURE: 97.9 F | SYSTOLIC BLOOD PRESSURE: 147 MMHG | HEIGHT: 68 IN | OXYGEN SATURATION: 97 %

## 2023-08-02 VITALS — WEIGHT: 158 LBS | BODY MASS INDEX: 24.02 KG/M2

## 2023-08-02 VITALS — DIASTOLIC BLOOD PRESSURE: 81 MMHG | SYSTOLIC BLOOD PRESSURE: 135 MMHG

## 2023-08-02 PROCEDURE — 99203 OFFICE O/P NEW LOW 30 MIN: CPT

## 2023-08-02 PROCEDURE — 99213 OFFICE O/P EST LOW 20 MIN: CPT

## 2023-08-02 RX ORDER — FAMOTIDINE 20 MG/1
20 TABLET ORAL
Qty: 180 | Refills: 0 | Status: COMPLETED | COMMUNITY
Start: 2023-07-27 | End: 2023-08-02

## 2023-08-02 NOTE — PHYSICAL EXAM
[Well Developed] : well developed [Well Nourished] : well nourished [Normal Lips/Gums] : the lips and gums were normal [Normal Rate] : the respiratory rate was normal [Rate ___] : at [unfilled] breaths per minute [Normal Rhythm/Effort] : normal respiratory rhythm and effort [Clear Bilaterally] : the lungs were clear to auscultation bilaterally [Normal to Percussion] : the lungs were normal to percussion [Heart Rate And Rhythm] : heart rate was normal and rhythm regular [Normal S1, S2] : normal S1 and S2 [Soft, Nontender] : the abdomen was soft and nontender [No Mass] : no masses were palpated [No HSM] : no hepatosplenomegaly noted [None] : no CVA tenderness [Cervical Lymph Nodes Enlarged Posterior Bilaterally] : no posterior cervical lymphadenopathy [Cervical Lymph Nodes Enlarged Anterior Bilaterally] : no anterior cervical lymphadenopathy [No CVA Tenderness] : no CVA  tenderness [No Spinal Tenderness] : no spinal tenderness [Normal Station and Gait] : the gait and station were normal [Normal Motor Tone] : the muscle tone was normal [Involuntary Movements] : no involuntary movements were seen [Normal Scalp] : inspection of the scalp showed no abnormalities [Examination Of The Hair] : texture and distribution of hair was normal [Normal] : the deep tendon reflexes were normal [Normal Mental Status] : the patient's orientation, memory, attention, language and fund of knowledge were normal [Appropriate] : appropriate [Impaired judgment] : intact judgment [Impaired Insight] : intact insight

## 2023-08-02 NOTE — ASSESSMENT
[FreeTextEntry1] : 1 hpfracisco  She will be treated  with new guidelines using 4 drugs for eradication and also probiotic.  I discussed risks of treatment such as pseudomembranous colitis and to take probiotic for 6 months.  she also understands allergic reactions can occur and she is to stop the medication immediately and go to er.Most individuals with chronic gastritis or duodenitis have no symptoms. However, some people develop more serious problems, including stomach or duodenal ulcers.  Ulcers can cause a variety of symptoms or no symptoms at all, with the most common ulcer symptoms including:  ?Pain or discomfort (usually in the upper abdomen)  ?Bloating  ?Feeling full after eating a small amount of food  ?Lack of appetite  ?Nausea or vomiting  ?Dark or tar-colored stools  ?Ulcers that bleed can cause a low blood count and fatigue (see "Patient education: Peptic ulcer disease (Beyond the Basics)")   Less commonly, chronic gastritis causes abnormal changes in the stomach lining, which can lead to certain forms of cancer. It is uncommon to develop cancer as a result of H. pylori infection. Nevertheless, because so many people in the world are infected with H. pylori, it is considered to be an important cause of stomach cancer. People who live in countries in which H. pylori infection occurs at an early age are at greatest risk of stomach cancer.   H. PYLORI DIAGNOSIS There are several ways to diagnose H. pylori. The most commonly used tests include the following:  Breath tests  Breath tests (known as urea breath tests) require that you drink a specialized solution containing a substance that is broken down by the H. pylori bacterium. The breakdown products can be detected in your breath.  Stool tests  Tests are available that detect H. pylori proteins in stool.  Blood tests  Blood tests can detect specific antibodies (proteins) that the body's immune system develops in response to the H. pylori bacterium. However, concerns over its accuracy have limited its use.   WHO SHOULD BE TESTED FOR H. PYLORI?  If you have symptoms  Diagnostic testing for H. pylori infection is recommended if you have active gastric or duodenal ulcers or if you have a past history of ulcers.  Although H. pylori infection is the most common cause of ulcers, not all patients with ulcers have H. pylori. Certain medications (eg, aspirin, ibuprofen [Motrin, Advil], naproxen [Aleve]) can also cause peptic ulcers. (See "Patient education: Peptic ulcer disease (Beyond the Basics)".)  If you do not have symptoms  H. pylori testing is usually not recommended if you have no symptoms and no past history of peptic ulcer disease. However, it may be considered for selected people, such as those with a family history or concern about stomach cancer, particularly individuals of Chinese, Chinese, [place], or Central American descent; these groups have a higher incidence of stomach cancer.   H. PYLORI TREATMENT People with a history of peptic ulcer disease, active gastric ulcer, or active duodenal ulcer associated with H. pylori infection should be treated. Successful treatment of H. pylori can help the ulcer to heal, prevent ulcers from coming back, and reduce the risk of ulcer complications (like bleeding). Guidelines in the [place] and other countries recommend that patients who require long-term anti-inflammatory medications such as aspirin, ibuprofen, naproxen, and similar drugs treatment for arthritis and other medical conditions should be tested for H. pylori and if infected undergo treatment to eradicate the H. pylori infection [1,2].  Medications  No single drug cures H. pylori infection. Most treatment regimens involve taking several medications for 14 days.  ?Most of the treatment regimens include a medication called a proton pump inhibitor. This medication decreases the stomach's production of acid, which allows the tissues damaged by the infection to heal. Examples of proton pump inhibitors include lansoprazole (Prevacid), omeprazole (Prilosec), pantoprazole (Protonix), rabeprazole (AcipHex), dexlansoprazole (Dexilant), and esomeprazole (Nexium).    ?Two antibiotics are also generally recommended; this reduces the risk of treatment failure and antibiotic resistance.   ?There are increasing numbers of patients with H. pylori infection that is resistant to antibiotics, so it is important to take all the medications prescribed and to have a test that confirms that the infection has been cleared.   For H. pylori treatment to be effective, it is important to take the entire course of all medications.  Side effects  Up to 50 percent of patients have side effects while taking H. pylori treatment. Side effects are usually mild, and fewer than 10 percent of patients stop treatment because of side effects. For those who do experience side effects, it may be possible to make adjustments in the dose or timing of medication. Some of the most common side effects are described below.  ?Some of the treatment regimens use a medication called metronidazole (Flagyl) or clarithromycin (Biaxin). These medications can cause a metallic taste in the mouth.   ?Alcoholic beverages (eg, beer, wine) should be avoided while taking metronidazole; the combination can cause skin flushing, headache, nausea, vomiting, sweating, and a rapid heart rate.   ?Bismuth, which is contained in some of the regimens, causes the stool to become black and may cause constipation.   ?Many of the regimens cause diarrhea and stomach cramps.   Treatment failure  Up to 20 percent of patients with H. pylori infection are not cured after completing their first course of treatment. A second treatment regimen is usually recommended in this case. Retreatment usually requires that the patient take 14 days of a proton pump inhibitor and two antibiotics. At least one of the antibiotics is different from those used in the first treatment course.  Follow-up  After completing H. pylori treatment, repeat testing is usually performed to ensure that the infection has resolved. This is typically done with a breath or stool test (see 'Breath tests' above). Blood tests are not recommended for follow up testing; the antibody detected by the blood test often remains in the blood for four or more months after treatment, even after the infection is eliminated.   SUMMARY  ?Helicobacter pylori, also known as H. pylori, is a bacterium that is commonly found in the stomach. Most people infected with H. pylori have no problems. However, some people develop problems, such as stomach ulcers.   ?Ulcers may cause no symptoms, or may cause pain or discomfort (usually in the upper abdomen), bloating, feeling full after eating a small amount of food, lack of appetite, nausea, vomiting, and dark or tar-colored stools. Ulcers that bleed can cause a low blood count.   ?H. pylori can be diagnosed with a test of the blood, breath, or stool.   ?H. pylori testing is recommended for anyone with a peptic (stomach or duodenal) ulcer.   ?Anyone diagnosed with H. pylori should be treated. H. pylori treatment helps to heal the ulcer, lowers the risk that the ulcer will return, and lowers the risk of bleeding from the ulcer.   ?H. pylori treatment usually includes several medicines. At least two of the medicines are antibiotics that help to kill the bacteria. The other medication causes the stomach to make less acid; lower acid levels help the ulcer to heal.   ?Most people are cured after finishing two weeks of medicine. Some people need to take another two weeks of medicine. It is important to finish all of the medicine to ensure that the bacteria are killed.   ?Guidelines recommend that all patients treated for H. pylori undergo a breath or stool test two weeks after finishing the medication [1-3].This is done to be sure that the bacteria were killed. It is recommended that the test is performed 30 days after the treatment is completed and off proton pump medication for 1 to 2 weeks before eradication testing number of factors can increase a person's risk of becoming infected with C. difficile:  ?Current or recent antibiotic use  Certain antibiotics increase the risk of becoming infected with C. difficile more than others (table 1).   ?Current or recent hospitalization  Up to 20 percent of people who are hospitalized and up to 50 percent of people in long-term care facilities (eg, nursing homes) carry C. difficile in their feces, but many do not have diarrhea or other symptoms. Exposure to these carriers significantly increases a person's risk of becoming infected.   ?Older age  The risk of becoming infected with C. difficile is 10 times greater in people who are 65 years or older.   ?Severe illness  People who have a weakened immune system as a result of an underlying medical condition or a treatment (eg, chemotherapy) are at increased risk of becoming infected with C. difficile, especially during a hospital stay.   ?Recent infection with C. difficile  People who have been recently infected with C. difficile and treated have an increased risk of becoming infected again soon after stopping the treatment.   ?Inflammatory bowel disease (ulcerative colitis or Crohn's disease with colitis)  People with colitis from inflammatory bowel disease have an increased risk of developing C. difficile infection. In this circumstance, C. difficile infection may develop in the absence of prior antibiotic treatment.    C. DIFFICILE SYMPTOMS Symptoms of C. difficile may begin during antibiotic therapy or 5 to 10 days after the antibiotic is stopped; less commonly, symptoms do not develop until as late as 10 weeks later.  The symptoms of C. difficile infection can vary in severity:  ?Some people (called carriers) shed the bacteria in their feces but have no signs or symptoms of the infection and can spread the infection to others.   ?The most common symptoms include diarrhea (three or more unformed bowel movements per day, or diarrhea associated with abdominal cramping).   ?In more severe cases, patients may develop profuse watery diarrhea (up to 10 to 15 times per day), blood or pus in the stool, dehydration, abdominal tenderness and cramping, fever, nausea, loss of appetite, and weight loss. Anyone who develops one or more of these symptoms should seek medical care as soon as possible.   ?Life-threatening complications of C. difficile infection develop in a small number of people. Signs and symptoms of severe infection may include abdominal distension, severe abdominal pain, fever (greater than 101F, or 38.3C), and profuse diarrhea. In rare cases, the bowels can rupture, potentially leading to a body-wide infection (sepsis), organ failure, or even death.    C. DIFFICILE DIAGNOSIS The diagnosis of C. difficile is based upon laboratory analysis of a stool sample to identify toxin-producing C. difficile. (See "Clostridioides (formerly Clostridium) difficile infection in adults: Clinical manifestations and diagnosis".)   C. DIFFICILE TREATMENT The most important step in treatment of C. difficile is to stop the antibiotic that allowed the infection to develop. If an antibiotic is necessary to treat an ongoing infection, the health care provider may choose an antibiotic that is less likely to allow further growth of C. difficile, when possible.  Antibiotic treatment  Usually an oral antibiotic (most often vancomycin or fidaxomicin) is used to treat people who are infected with C. difficile. It is important to take each dose of the antibiotic on time and to finish the entire course of treatment (usually 10 to 14 days).  Treatment of severe disease  People who become severely ill as a result of C. difficile are treated in the hospital with both oral and intravenous antibiotic and intravenous fluids. The person is monitored closely for signs of worsening disease. (See "Clostridioides (formerly Clostridium) difficile infection in adults: Treatment and prevention".)  Surgery  If a person fails to improve with antibiotics and supportive care and the infection worsens, surgery (removal of the colon) may be warranted; this is generally limited to people with life-threatening illness.  Supportive treatments for diarrhea  Diarrhea can cause a person to become dehydrated quickly, especially if it is severe. To avoid becoming dehydrated, several strategies are recommended.  Drink adequate fluids  It is important to drink an adequate amount of fluids to counteract the loss of fluids from diarrhea. The fluids should contain water, salt, and sugar. The fluids used for sweat replacement (eg, Gatorade) are not optimal, although they may be sufficient for an adult with diarrhea who is not dehydrated and is otherwise healthy. Diluted fruit juices and flavored soft drinks along with saltine crackers and broths or soups may also be acceptable. Fluid replacement in children should be handled differently. (See "Patient education: Acute diarrhea in children (Beyond the Basics)".)  One way to  hydration is by observing the color of the urine and how frequently the person urinates. Normally, urine should be light yellow to nearly colorless. A person who is well hydrated normally passes urine every three to five hours. A person who urinates infrequently or has urine that is dark yellow should drink more fluids.  If a person becomes dehydrated and is unable to take fluids by mouth, a rehydration solution can be given into a vein (intravenous fluids) in a health care provider's office or in the emergency department.  Diet  There is no particular food or group of foods that is best for a person with diarrhea. However, adequate nutrition is important during an episode of acute diarrhea. For patients without an appetite, it is acceptable to consume only liquids for a short period of time. Boiled starches and cereals (eg, potatoes, noodles, rice, wheat, and oats) with salt are recommended for people with watery diarrhea; crackers, bananas, soup, and boiled vegetables may also be eaten. It is advisable to avoid fresh fruits and vegetables and milk products until the diarrhea is gone.   C. DIFFICILE PREVENTION It is uncommon for people who are not taking antibiotics to become infected with C. difficile. However, it is still important to avoid spreading the bacteria. A person can spread the bacteria for as long as the diarrhea continues.  Hand washing  Hand washing is an effective way to prevent the spread of C. difficile. Hands should be washed after using the bathroom and before eating. Hands should ideally be wet with water and plain or antibacterial soap and rubbed together for 15 to 30 seconds. Special attention should be paid to the fingernails, between the fingers, and the wrists. Hands should be rinsed thoroughly and dried with a single-use towel. Patients and family members are encouraged to remind health care providers to wash their hands as well.  Alcohol-based hand rubs may be less effective against C. difficile; using soap and running water is recommended if there is an outbreak of C. difficile infection.  Fingernails that are artificial (eg, acrylic) are impossible to clean adequately, even after vigorous scrubbing or use of an antimicrobial soap. For this reason, health care workers are not allowed to wear artificial nails.  Contact precautions  People who are hospitalized with C. difficile are placed on contact precautions, which mean that anyone who enters the patient's room must wash their hands before entering and after leaving. The person must also wear a clean gown (over their clothes) and clean gloves. These measures can help to prevent the spread of infection to other people in the hospital. Contact precautions and gowns and gloves are not recommended after patients are discharged. Person-to-person spread to family, work, or social contacts is very rare. Handwashing with soap and water after using the bathroom is recommended for all patients being treated for C. difficile. It is not necessary for a person who is being treated for C. difficile infection at home to avoid 2   colonoscopy   WE discussed procedure and will return prior for blood testing and instruction.  Colon and rectal cancer screening is a way in which doctors check the colon and rectum for signs of cancer or growths (called polyps) that might become cancer. It is done in people who have no symptoms and no reason to think they have cancer. The goal is to find and remove polyps before they become cancer, or to find cancer early, before it grows, spreads, or causes problems.  The colon and rectum are the last part of the digestive tract (figure 1). When doctors talk about colon and rectal cancer screening, they use the term "colorectal." That is just a shorter way of saying "colon and rectal." It's also possible to say just colon cancer screening.  Studies show that having colon cancer screening lowers the chance of dying from colon cancer. There are several different types of screening test that can do this.  What are the different screening tests for colon cancer?  They include:  ?Colonoscopy  Colonoscopy allows the doctor to see directly inside the entire colon. Before you can have a colonoscopy, you must clean out your colon. You do this at home by drinking a special liquid that causes watery diarrhea for several hours. On the day of the test, you get medicine to help you relax. Then a doctor puts a thin tube into your anus and advances it into your colon (figure 2). The tube has a tiny camera attached to it, so the doctor can see inside your colon. The tube also has tiny tools on the end, so the doctor can remove pieces of tissue or polyps if they are there. After polyps or pieces of tissue are removed, they are sent to a lab to be checked for cancer.   Advantages of this test  Colonoscopy finds most small polyps and almost all large polyps and cancers. If found, polyps can be removed right away. This test gives the most accurate results. If any other screening tests are done first and come back positive, a colonoscopy will need to be done for follow-up. If you have a colonoscopy as your first test, you will probably not need a second follow-up test soon after.   Drawbacks to this test  Colonoscopy has some small risks. It can cause bleeding or tear the inside of the colon, but this only happens in 1 out of 1,000 people. Also, cleaning out the bowel beforehand can be unpleasant. Plus, people usually cannot work or drive for the rest of the day after the test, because of the relaxation medicine they must take during the test.   Sigmoidoscopy  A sigmoidoscopy is similar to a colonoscopy. The difference is that this test looks only at the last part of the colon, and a colonoscopy looks at the whole colon. Before you have a sigmoidoscopy, you must clean out the lower part of your colon using an enema. This bowel cleaning is not as thorough or unpleasant as the one for colonoscopy. For this test, you do not need to take medicines to help you relax, so you can drive and work afterward if you want.   Advantages of this test  Sigmoidoscopy can find polyps and cancers in the rectum and the last part of the colon. If polyps are found, they can be removed right away.   Drawbacks to this test  In about 2 out of 10,000 people, sigmoidoscopy tears the inside of the colon. The test also can't find polyps or cancers that are in the part of the colon the test does not view (figure 3). If doctors find polyps or cancer during a sigmoidoscopy, they usually follow up with a colonoscopy.   CT colonography (also known as virtual colonoscopy or CTC)  CTC looks for cancer and polyps using a special X-ray called a "CT scan." For most CTC tests, the preparation is the same as it is for colonoscopy.   Advantages of this test  CTC can find polyps and cancers in the whole colon without the need for medicines to relax.   Drawbacks to this test  If doctors find polyps or cancer with CTC, they usually follow up with a colonoscopy. CTC sometimes finds areas that look abnormal but that turn out to be healthy. This means that CTC can lead to tests and procedures you did not need. Plus, CTC exposes you to radiation. In most cases, the preparation needed to clean the bowel is the same as the one needed for a colonoscopy. The test is expensive, and some insurance companies might not cover this test for screening.   Stool test for blood  "Stool" is another word for bowel movements. Stool tests most commonly check for blood in samples of stool. Cancers and polyps can bleed, and if they bleed around the time you do the stool test, then blood will show up on the test. The test can find even small amounts of blood that you can't see in your stool. Other less serious conditions can also cause small amounts of blood in the stool, and that will show up in this test. You will have to collect small samples from your bowel movements, which you will put in a special container you get from your doctor or nurse. Then you follow the instructions to mail the container out for the testing.   Advantages of this test  This test does not involve cleaning out the colon or having any procedures.   Drawbacks to this test  Stool tests are less likely to find polyps than other screening tests. These tests also often come up abnormal even in people who do not have cancer. If a stool test shows something abnormal, doctors usually follow up with a colonoscopy.   Stool DNA test  The stool DNA test checks for genetic markers of cancer, as well as for signs of blood. For this test, you get a special kit in order to collect a whole bowel movement. Then you follow the instructions about how and where to ship it.   Advantages of this test  This test does not involve cleaning out the colon or having any procedures. When cancer is not present, it is less likely to be falsely abnormal than a stool test for blood. That means it leads to fewer unnecessary colonoscopies.   Drawbacks to this test  It might be unpleasant to collect and ship a whole bowel movement. If a DNA test shows something abnormal, doctors usually follow up with a colonoscopy.   There is no blood test that most experts think is accurate enough to use for screening.  How do I choose which test to have?  Work with your doctor or nurse to decide which test is best for you. Some doctors might choose to combine screening tests, for example, sigmoidoscopy plus stool testing for blood. Being screenedno matter howis more important than which test you choose.  Who should be screened for colon cancer?  Doctors recommend that most people begin having colon cancer screening at age 50. People who have an increased risk of getting colon cancer sometimes begin screening at a younger age. That might include people with a strong family history of colon cancer, and people with diseases of the colon called "Crohn's disease" and "ulcerative colitis."  Most people can stop being screened around the age of 75, or at the latest 85.  How often should I be screened?  That depends on your risk of colon cancer and which test you have. People who have a high risk of colon cancer often need to be tested more often and should have a colonoscopy.  Most people are not at high risk, so they can choose one of these schedules:  Colonoscopy every 10 years  CT colonography (CTC) every 5 years  Sigmoidoscopy every 5 to 10 years  Stool testing for blood once a year  Stool DNA testing every 3 years (but doctors are not yet sure of the best time frame) 3.  elevated psa  he is to see urologist   4.  smoking The immune system is the bodys way of protecting itself from infection and disease. Smoking compromises the immune system, making smokers more likely to have respiratory infections.  Smoking also causes several autoimmune diseases, including Crohns disease and rheumatoid arthritis. It may also play a role in periodic flare-ups of signs and symptoms of autoimmune diseases. Smoking doubles your risk of developing rheumatoid arthritis.  Smoking has recently been linked to type 2 diabetes, also known as adult-onset diabetes. Smokers are 30% to 40% more likely to develop type 2 diabetes than nonsmokers. Additionally, the more cigarettes an individual smokes, the higher the risk for diabetes.  Back to top   How does smoking affect my bones?   Recent studies show a direct relationship between tobacco use and decreased bone density. Smoking is one of many factorsincluding weight, alcohol consumption, and activity levelthat increase your risk for osteoporosis, a condition in which bones weaken and become more likely to fracture.  Significant bone loss has been found in older women and men who smoke. Quitting smoking appears to reduce the risk for low bone mass and fractures. However, it may take several years to lower a former smokers risk.  In addition, smoking from an early age puts women at even higher risk for osteoporosis. Smoking lowers the level of the hormone estrogen in your body, which can cause you to go through menopause earlier, boosting your risk for osteoporosis.  Back to top   How does smoking affect my heart and blood vessels?   The chemicals in tobacco smoke harm your blood cells and damage the function of your heart. This damage increases your risk for: Atherosclerosis, a disease in which a waxy substance called plaque builds up in your arteries Aneurysms, which are bulging blood vessels that can burst and cause death Cardiovascular disease (CVD), which includes: ?Coronary heart disease (CHD), narrow or blocked arteries around the heart ?Heart attack and damage to your arteries ?Heart-related chest pain ?High blood pressure  Coronary Heart disease, where plateletscomponents in the bloodstick together along with proteins for form clots which can then get stuck in the plaque in the walls of arteries and cause heart attacks Peripheral arterial disease (PAD), a condition in which plaque builds up in the arteries that carry blood to the head, organs, and limbs Stroke, which is sudden death of brain cells caused by blood clots or bleeding  Breathing tobacco smoke can even change your blood chemistry and damage your blood vessels. As you inhale smoke, cells that line your bodys blood vessels react to its chemicals. Your heart rate and blood pressure go up and your blood vessels thicken and narrow.  Back to top   How does smoking affect my lungs and breathing?   Every cigarette you smoke damages your breathing and scars your lungs. Smoking causes: Chronic obstructive pulmonary disease (COPD), a disease that gets worse over time and causes wheezing, shortness of breath, chest tightness, and other symptoms Emphysema, a condition in which the walls between the air sacs in your lungs lose their ability to stretch and shrink back. Your lung tissue is destroyed, making it difficult or impossible to breathe. Chronic bronchitis, which causes swelling of the lining of your bronchial tubes. When this happens, less air flows to and from your lungs. Pneumonia Asthma Tuberculosis   People with asthma can suffer severe attacks when around cigarette smoke.  Back to top   Can smoking affect my vision?   Smoking is as bad for your eyes as it is for the rest of your body. Research has linked smoking to an increased risk of developing age-related macular degeneration, cataract, and optic nerve damage, all of which can lead to blindness.  Back to top   Do cigarettes cause cancer?   Tobacco smoke contains more than 7,000 chemicals. About 70 of them are known to cause cancer. Smoking cigarettes is the number-one risk factor for lung cancer. But, smoking can affect your entire body, and is known to cause cancer in the: Lungs Trachea Bronchus Esophagus Oral Cavity Lip Nasopharynx Nasal Cavity Larynx Stomach Bladder Pancreas Kidney Liver Uterine Cervix Colon Rectum   In addition, smoking is known to cause leukemia.  Learn more about cigarettes.  Back to top   Do light cigarettes cause cancer?   There is no such thing as a safe cigarette. People who smoke any kind of cigarette are at an increased risk for smoking-related diseases. Although it is no longer legal to sell light cigarettes, people who smoked light cigarettes in the past are likely to have inhaled the same amount of toxic chemicals as those who smoked regular cigarettes. They remain at high risk of developing smoking-related cancers and other diseases. Learn more about light cigarettes.  Back to top   Do menthol cigarettes cause cancer?   All cigarettes are harmful, including menthol cigarettes. Many smokers think menthol cigarettes are less harmful, but there is no evidence that menthol cigarettes are safer than other cigarettes. Like other cigarettes, menthol cigarettes harm nearly every organ in the body and cause many diseases, including cancer, cardiovascular diseases, and respiratory diseases. Menthol cigarettes, like other cigarettes, also negatively impact male and female fertility and are harmful to pregnant women and their unborn babies.  Some research shows that menthol cigarettes may be more addictive than non-menthol cigarettes. More research is needed to understand how addiction differs between menthol and non-menthol cigarette use. Learn more about menthol cigarettes.  Back to top   Can smoking cigars and pipes cause cancer?   Cigar and pipe smoke, like cigarette smoke, contains toxic and cancer-causing chemicals that are harmful to both smokers and non-smokers. Cigar and pipe smoking causes: Bladder cancer Esophageal cancer Laryngeal (voice box) cancer Lip cancer Lung cancer Mouth cancer Throat cancer Tongue cancer

## 2023-08-02 NOTE — END OF VISIT
[FreeTextEntry3] : resdient present  [Time Spent: ___ minutes] : I have spent [unfilled] minutes of time on the encounter. [>50% of the face to face encounter time was spent on counseling and/or coordination of care for ___] : Greater than 50% of the face to face encounter time was spent on counseling and/or coordination of care for [unfilled]

## 2023-08-03 RX ORDER — PANTOPRAZOLE 40 MG/1
40 TABLET, DELAYED RELEASE ORAL TWICE DAILY
Qty: 28 | Refills: 0 | Status: DISCONTINUED | COMMUNITY
Start: 2023-08-02 | End: 2023-08-03

## 2023-08-07 ENCOUNTER — APPOINTMENT (OUTPATIENT)
Dept: PULMONOLOGY | Facility: CLINIC | Age: 63
End: 2023-08-07
Payer: MEDICARE

## 2023-08-07 VITALS
WEIGHT: 158 LBS | OXYGEN SATURATION: 99 % | TEMPERATURE: 98.4 F | SYSTOLIC BLOOD PRESSURE: 128 MMHG | HEIGHT: 67 IN | DIASTOLIC BLOOD PRESSURE: 84 MMHG | BODY MASS INDEX: 24.8 KG/M2 | HEART RATE: 84 BPM

## 2023-08-07 DIAGNOSIS — Z72.0 TOBACCO USE: ICD-10-CM

## 2023-08-07 PROCEDURE — 99203 OFFICE O/P NEW LOW 30 MIN: CPT | Mod: 25

## 2023-08-07 PROCEDURE — 94727 GAS DIL/WSHOT DETER LNG VOL: CPT

## 2023-08-07 PROCEDURE — 94729 DIFFUSING CAPACITY: CPT

## 2023-08-07 PROCEDURE — 94060 EVALUATION OF WHEEZING: CPT

## 2023-08-07 NOTE — PROCEDURE
[FreeTextEntry1] : PFT   normal FEV1,  FVC are diminished and there is diminished flow at level of small airways Diminished lung volumes and normal diffusion  The FEV/FVC is 71%  Elijah Hobson MD [facility]   
No

## 2023-08-07 NOTE — DISCUSSION/SUMMARY
[FreeTextEntry1] : 63 year old man who has tray a smoker.  he recently stopped  he has slight wheeze on exam  His PFT demonstrated obstructive pattern, mild as well as diminished TLC suggesting restriction  He has mild dyspnea on exertion he believes is due to knee problem  Will observe off inhaled bronchodilator   I have discussed LDSCT and the patient wishes to proceed  LD CT chest ordered   Elijah Hobson MD

## 2023-08-07 NOTE — HISTORY OF PRESENT ILLNESS
[Current] : current [Snoring] : snoring [TextBox_4] : 63 year old patient presents for evaluation of respiratory status. He has been a cigarette smoker.  The patient has no active pulmonary problems.  The patient denies history of COPD, asthma, pneumonia, chest pain, postnasal drip,  sinusitis, wheeze, cough, sputum production or shortness of breath.   Primary doctor is    PSH:  cervical spine   PMH:  H. pylori on antibiotics   gastritis  SH:  active smoker  ETOH:  occasional  Occupation: retired, worked as construction  No exposure to chemicals, dust, asbestos, mold  ALLERGY:  NKDA   environmental/seasonal allergy:  none    Review of Systems:   no sinusitis, sinus infections, nasal obstruction no dysphagia no dry mouth   no pneumonia no wheeze no lung cancer  no CAD no MI no chest pain no murmur no CHF no HTN no edema   no liver disease  no Diabetes no thyroid disease no hyperlipidemia   no bleeding  no DVT or PE  no kidney disease  no stroke no seizure  history of COVID infection.             [TextBox_11] : 1 [TextBox_13] : 30 [TextBox_22] : stopped 2 weeks ago [Unintentional Sleep while Active] : no unintentional sleep while active [Witnessed Apneas] : no witnessed apneas

## 2023-08-07 NOTE — PHYSICAL EXAM
[No Acute Distress] : no acute distress [Well Nourished] : well nourished [Well Developed] : well developed [Normal Oropharynx] : normal oropharynx [I] : Mallampati Class: I [Normal Appearance] : normal appearance [No Neck Mass] : no neck mass [Normal Rate/Rhythm] : normal rate/rhythm [Normal S1, S2] : normal s1, s2 [No Murmurs] : no murmurs [No Resp Distress] : no resp distress [Clear to Auscultation Bilaterally] : clear to auscultation bilaterally [Benign] : benign [Soft] : soft [No Clubbing] : no clubbing [No Edema] : no edema [Oriented x3] : oriented x3

## 2023-08-14 ENCOUNTER — APPOINTMENT (OUTPATIENT)
Dept: CARDIOLOGY | Facility: CLINIC | Age: 63
End: 2023-08-14
Payer: MEDICARE

## 2023-08-14 VITALS
SYSTOLIC BLOOD PRESSURE: 134 MMHG | HEART RATE: 86 BPM | TEMPERATURE: 98.6 F | OXYGEN SATURATION: 96 % | DIASTOLIC BLOOD PRESSURE: 77 MMHG | HEIGHT: 67 IN | BODY MASS INDEX: 24.33 KG/M2 | WEIGHT: 155 LBS

## 2023-08-14 DIAGNOSIS — R07.9 CHEST PAIN, UNSPECIFIED: ICD-10-CM

## 2023-08-14 DIAGNOSIS — F17.200 NICOTINE DEPENDENCE, UNSPECIFIED, UNCOMPLICATED: ICD-10-CM

## 2023-08-14 DIAGNOSIS — R94.31 ABNORMAL ELECTROCARDIOGRAM [ECG] [EKG]: ICD-10-CM

## 2023-08-14 DIAGNOSIS — M79.605 PAIN IN RIGHT LEG: ICD-10-CM

## 2023-08-14 DIAGNOSIS — M79.604 PAIN IN RIGHT LEG: ICD-10-CM

## 2023-08-14 PROCEDURE — 99204 OFFICE O/P NEW MOD 45 MIN: CPT

## 2023-08-24 ENCOUNTER — APPOINTMENT (OUTPATIENT)
Dept: UROLOGY | Facility: CLINIC | Age: 63
End: 2023-08-24
Payer: MEDICARE

## 2023-08-24 VITALS
SYSTOLIC BLOOD PRESSURE: 137 MMHG | HEIGHT: 67 IN | BODY MASS INDEX: 24.33 KG/M2 | WEIGHT: 155 LBS | HEART RATE: 55 BPM | DIASTOLIC BLOOD PRESSURE: 72 MMHG | OXYGEN SATURATION: 98 % | TEMPERATURE: 98.2 F

## 2023-08-24 DIAGNOSIS — M54.17 RADICULOPATHY, LUMBOSACRAL REGION: ICD-10-CM

## 2023-08-24 DIAGNOSIS — M54.50 LOW BACK PAIN, UNSPECIFIED: ICD-10-CM

## 2023-08-24 DIAGNOSIS — N52.9 MALE ERECTILE DYSFUNCTION, UNSPECIFIED: ICD-10-CM

## 2023-08-24 DIAGNOSIS — I70.213 ATHEROSCLEROSIS OF NATIVE ARTERIES OF EXTREMITIES WITH INTERMITTENT CLAUDICATION, BILATERAL LEGS: ICD-10-CM

## 2023-08-24 PROCEDURE — 99214 OFFICE O/P EST MOD 30 MIN: CPT

## 2023-08-24 RX ORDER — ZOSTER VACCINE RECOMBINANT, ADJUVANTED 50 MCG/0.5
50 KIT INTRAMUSCULAR
Qty: 2 | Refills: 0 | Status: ACTIVE | COMMUNITY
Start: 2023-08-24 | End: 1900-01-01

## 2023-08-24 RX ORDER — ZOSTER VACCINE RECOMBINANT, ADJUVANTED 50 MCG/0.5
50 KIT INTRAMUSCULAR
Qty: 1 | Refills: 2 | Status: DISCONTINUED | COMMUNITY
Start: 2023-07-27 | End: 2023-08-24

## 2023-08-25 ENCOUNTER — APPOINTMENT (OUTPATIENT)
Age: 63
End: 2023-08-25
Payer: MEDICARE

## 2023-08-25 VITALS
DIASTOLIC BLOOD PRESSURE: 87 MMHG | SYSTOLIC BLOOD PRESSURE: 138 MMHG | WEIGHT: 156 LBS | BODY MASS INDEX: 24.48 KG/M2 | HEIGHT: 67 IN | HEART RATE: 54 BPM

## 2023-08-25 PROCEDURE — 99204 OFFICE O/P NEW MOD 45 MIN: CPT

## 2023-08-25 PROCEDURE — 93880 EXTRACRANIAL BILAT STUDY: CPT

## 2023-08-25 NOTE — CONSULT LETTER
[Dear  ___] : Dear  [unfilled], [Consult Letter:] : I had the pleasure of evaluating your patient, [unfilled]. [Please see my note below.] : Please see my note below. [Consult Closing:] : Thank you very much for allowing me to participate in the care of this patient.  If you have any questions, please do not hesitate to contact me. [Sincerely,] : Sincerely, [FreeTextEntry3] : Lonnie Mascorro M.D., F.GREY., R.P.WARNERI.  of Vascular Surgery Assistant Professor of Radiology Director of Endovascular Program/ [facility]

## 2023-08-25 NOTE — HISTORY OF PRESENT ILLNESS
[FreeTextEntry1] : Patient is a 63-year-old gentleman with past medical history significant for spinal disease status post thoracic and cervical spine repair in 2019, history of smoking 1 pack/day for 30 to 40 years who quit about a month ago presenting to us for evaluation of bilateral lower extremity discomfort.  Patient reports having 4-5 block claudication with weakness and difficulty with balance.  These are similar symptoms to when the patient had his spinal surgery.  Symptoms improved significantly after the surgery for the next few years but recently he has had recurrent symptoms.  Patient has not had follow-up with neurology service.  No history of tissue loss.  No complaint of rest pain.

## 2023-08-25 NOTE — ASSESSMENT
[FreeTextEntry1] : Patient with complaints of bilateral lower extremity weakness, tingling, and cramping.  Based on clinical examination, history, and strongly palpable pedal pulses, there is no evidence of significant arterial insufficiency.  Patient with no significant carotid disease.  Continue smoking cessation.  Follow-up as needed.

## 2023-08-28 ENCOUNTER — NON-APPOINTMENT (OUTPATIENT)
Age: 63
End: 2023-08-28

## 2023-08-28 ENCOUNTER — OUTPATIENT (OUTPATIENT)
Dept: OUTPATIENT SERVICES | Facility: HOSPITAL | Age: 63
LOS: 1 days | End: 2023-08-28
Payer: MEDICARE

## 2023-08-28 DIAGNOSIS — R94.31 ABNORMAL ELECTROCARDIOGRAM [ECG] [EKG]: ICD-10-CM

## 2023-08-28 DIAGNOSIS — S99.929A UNSPECIFIED INJURY OF UNSPECIFIED FOOT, INITIAL ENCOUNTER: Chronic | ICD-10-CM

## 2023-08-28 PROCEDURE — 93017 CV STRESS TEST TRACING ONLY: CPT

## 2023-08-28 PROCEDURE — 93306 TTE W/DOPPLER COMPLETE: CPT | Mod: 26

## 2023-08-28 PROCEDURE — A9502: CPT

## 2023-08-28 PROCEDURE — 78452 HT MUSCLE IMAGE SPECT MULT: CPT | Mod: MH

## 2023-08-28 PROCEDURE — 93018 CV STRESS TEST I&R ONLY: CPT

## 2023-08-28 PROCEDURE — 78452 HT MUSCLE IMAGE SPECT MULT: CPT | Mod: 26,MH

## 2023-08-28 PROCEDURE — 93016 CV STRESS TEST SUPVJ ONLY: CPT

## 2023-08-28 PROCEDURE — 93306 TTE W/DOPPLER COMPLETE: CPT

## 2023-08-29 ENCOUNTER — NON-APPOINTMENT (OUTPATIENT)
Age: 63
End: 2023-08-29

## 2023-08-29 ENCOUNTER — APPOINTMENT (OUTPATIENT)
Dept: OPHTHALMOLOGY | Facility: CLINIC | Age: 63
End: 2023-08-29
Payer: MEDICARE

## 2023-08-29 PROCEDURE — 92004 COMPRE OPH EXAM NEW PT 1/>: CPT

## 2023-08-29 PROCEDURE — 92202 OPSCPY EXTND ON/MAC DRAW: CPT

## 2023-08-30 NOTE — ASSESSMENT
[FreeTextEntry1] : The natural history of prostate cancer and ongoing controversy regarding screening and potential treatment outcomes of prostate cancer has been discussed with the patient. The meaning of a false positive PSA and a false negative PSA has been discussed. He indicates understanding of the limitations of this screening test  REviewed ptions continue  surveillance , biopsy or mri  Risks and benefits reviewed pt elcts to cont to monitor psa   Will try sildenafil side effects reviewed More common reviewed- redness or warmth in your face, neck, or chest; cold symptoms such as stuffy nose, sneezing, or sore throat; headache; memory problems; diarrhea, upset stomach; or muscle pain, back pain. Stop immediately and got to ER for changes in vision or sudden vision loss; ringing in your ears, or sudden hearing loss; chest pain or heavy feeling, pain spreading to the arm or shoulder, nausea, sweating, general ill feeling; irregular heartbeat; shortness of breath, swelling in your hands or feet; seizure (convulsions); feeling light-headed, fainting; or penis erection that is painful or lasts 4 hours or longer

## 2023-08-30 NOTE — HISTORY OF PRESENT ILLNESS
[FreeTextEntry1] : Elevated PSA  Patient is here with an elevated PSA. He has no personal history and no family history of prostate cancer.He has no prior genitourinary history of hematuria, hematospermia, prostatitis, UTI, erectile dysfunction, urolithiasis, epididymal orchitis.  No dysuria or hematuria psawas 7 repeat  3.89 now 4.2 poor erections last few years  now nocturia x1 occasional intermittent flow no dysuria or hematuria

## 2023-09-07 ENCOUNTER — NON-APPOINTMENT (OUTPATIENT)
Age: 63
End: 2023-09-07

## 2023-09-07 ENCOUNTER — APPOINTMENT (OUTPATIENT)
Dept: RADIOLOGY | Facility: IMAGING CENTER | Age: 63
End: 2023-09-07
Payer: MEDICARE

## 2023-09-07 ENCOUNTER — OUTPATIENT (OUTPATIENT)
Dept: OUTPATIENT SERVICES | Facility: HOSPITAL | Age: 63
LOS: 1 days | End: 2023-09-07
Payer: MEDICARE

## 2023-09-07 ENCOUNTER — APPOINTMENT (OUTPATIENT)
Dept: CT IMAGING | Facility: IMAGING CENTER | Age: 63
End: 2023-09-07
Payer: MEDICARE

## 2023-09-07 VITALS — HEIGHT: 67 IN | WEIGHT: 157 LBS | BODY MASS INDEX: 24.64 KG/M2

## 2023-09-07 DIAGNOSIS — R94.31 ABNORMAL ELECTROCARDIOGRAM [ECG] [EKG]: ICD-10-CM

## 2023-09-07 DIAGNOSIS — I73.9 PERIPHERAL VASCULAR DISEASE, UNSPECIFIED: ICD-10-CM

## 2023-09-07 DIAGNOSIS — Z00.00 ENCOUNTER FOR GENERAL ADULT MEDICAL EXAMINATION WITHOUT ABNORMAL FINDINGS: ICD-10-CM

## 2023-09-07 DIAGNOSIS — M79.604 PAIN IN RIGHT LEG: ICD-10-CM

## 2023-09-07 DIAGNOSIS — F17.210 NICOTINE DEPENDENCE, CIGARETTES, UNCOMPLICATED: ICD-10-CM

## 2023-09-07 DIAGNOSIS — S99.929A UNSPECIFIED INJURY OF UNSPECIFIED FOOT, INITIAL ENCOUNTER: Chronic | ICD-10-CM

## 2023-09-07 PROCEDURE — 77080 DXA BONE DENSITY AXIAL: CPT

## 2023-09-07 PROCEDURE — 77080 DXA BONE DENSITY AXIAL: CPT | Mod: 26

## 2023-09-07 PROCEDURE — 71271 CT THORAX LUNG CANCER SCR C-: CPT

## 2023-09-07 PROCEDURE — 71271 CT THORAX LUNG CANCER SCR C-: CPT | Mod: 26

## 2023-09-07 NOTE — REASON FOR VISIT
[Other Location: e.g. School (Enter Location, City,State)___] : at [unfilled], at the time of the visit. [Medical Office: ([facility])___] : at the medical office located in  [Verbal consent obtained from patient] : the patient, [unfilled] [Initial Evaluation] : an initial evaluation visit [Review of Eligibility] : review of eligibility [Low-Dose CT Screening Discussion] : low-dose CT lung cancer screening discussion [Virtual Visit] : virtual visit

## 2023-09-07 NOTE — HISTORY OF PRESENT ILLNESS
[Current] : Current [TextBox_13] :  Mr. ASHOK MORLEY is a 63 year old man with a history of nicotine dependence   Over the telephone today we reviewed and confirmed that the patient meets screening eligibility criteria:  -Age: 63 years old  Smoking status:  -Former smoker  -Number of pack(s) per day:1  -Number of years smoked:30  -Number of pack years smokin  -Number of years since quitting smoking:quit 1 month ago  -Quit year:   Mr. MORLEY denies any personal history of lung cancer. Denies any s/s of lung cancer. No lung cancer in a 1st degree relative. Denies any history of lung disease. Denies any history of occupational exposures [PacksperDay] : 1 [N_Years] : 30 [PacksperYear] : 30

## 2023-09-10 ENCOUNTER — TRANSCRIPTION ENCOUNTER (OUTPATIENT)
Age: 63
End: 2023-09-10

## 2023-09-15 ENCOUNTER — APPOINTMENT (OUTPATIENT)
Dept: ORTHOPEDIC SURGERY | Facility: CLINIC | Age: 63
End: 2023-09-15
Payer: MEDICARE

## 2023-09-15 VITALS
BODY MASS INDEX: 24.64 KG/M2 | WEIGHT: 157 LBS | DIASTOLIC BLOOD PRESSURE: 89 MMHG | SYSTOLIC BLOOD PRESSURE: 152 MMHG | HEART RATE: 53 BPM | OXYGEN SATURATION: 97 % | HEIGHT: 67 IN

## 2023-09-15 DIAGNOSIS — M48.04 SPINAL STENOSIS, THORACIC REGION: ICD-10-CM

## 2023-09-15 PROCEDURE — 99214 OFFICE O/P EST MOD 30 MIN: CPT

## 2023-09-18 ENCOUNTER — APPOINTMENT (OUTPATIENT)
Dept: ULTRASOUND IMAGING | Facility: HOSPITAL | Age: 63
End: 2023-09-18
Payer: MEDICARE

## 2023-09-18 ENCOUNTER — OUTPATIENT (OUTPATIENT)
Dept: OUTPATIENT SERVICES | Facility: HOSPITAL | Age: 63
LOS: 1 days | End: 2023-09-18
Payer: MEDICARE

## 2023-09-18 ENCOUNTER — RESULT REVIEW (OUTPATIENT)
Age: 63
End: 2023-09-18

## 2023-09-18 DIAGNOSIS — R07.9 CHEST PAIN, UNSPECIFIED: ICD-10-CM

## 2023-09-18 DIAGNOSIS — F17.200 NICOTINE DEPENDENCE, UNSPECIFIED, UNCOMPLICATED: ICD-10-CM

## 2023-09-18 DIAGNOSIS — R10.9 UNSPECIFIED ABDOMINAL PAIN: ICD-10-CM

## 2023-09-18 PROBLEM — M48.04 THORACIC SPINAL STENOSIS: Status: ACTIVE | Noted: 2018-11-02

## 2023-09-18 PROCEDURE — 76770 US EXAM ABDO BACK WALL COMP: CPT | Mod: 26,XS

## 2023-09-18 PROCEDURE — 93923 UPR/LXTR ART STDY 3+ LVLS: CPT | Mod: 26

## 2023-09-18 PROCEDURE — 93923 UPR/LXTR ART STDY 3+ LVLS: CPT

## 2023-09-18 PROCEDURE — 76700 US EXAM ABDOM COMPLETE: CPT | Mod: 26

## 2023-09-18 PROCEDURE — 76775 US EXAM ABDO BACK WALL LIM: CPT

## 2023-09-18 PROCEDURE — 76700 US EXAM ABDOM COMPLETE: CPT

## 2023-09-23 ENCOUNTER — APPOINTMENT (OUTPATIENT)
Dept: MRI IMAGING | Facility: HOSPITAL | Age: 63
End: 2023-09-23
Payer: MEDICARE

## 2023-09-23 ENCOUNTER — OUTPATIENT (OUTPATIENT)
Dept: OUTPATIENT SERVICES | Facility: HOSPITAL | Age: 63
LOS: 1 days | End: 2023-09-23
Payer: MEDICARE

## 2023-09-23 DIAGNOSIS — M48.02 SPINAL STENOSIS, CERVICAL REGION: ICD-10-CM

## 2023-09-23 DIAGNOSIS — S99.929A UNSPECIFIED INJURY OF UNSPECIFIED FOOT, INITIAL ENCOUNTER: Chronic | ICD-10-CM

## 2023-09-23 PROCEDURE — 72141 MRI NECK SPINE W/O DYE: CPT | Mod: 26,MH

## 2023-09-23 PROCEDURE — 72148 MRI LUMBAR SPINE W/O DYE: CPT | Mod: 26,MH

## 2023-09-23 PROCEDURE — 72141 MRI NECK SPINE W/O DYE: CPT

## 2023-09-23 PROCEDURE — 72146 MRI CHEST SPINE W/O DYE: CPT | Mod: 26,MH

## 2023-09-23 PROCEDURE — 72146 MRI CHEST SPINE W/O DYE: CPT

## 2023-09-23 PROCEDURE — 72148 MRI LUMBAR SPINE W/O DYE: CPT

## 2023-10-02 ENCOUNTER — LABORATORY RESULT (OUTPATIENT)
Age: 63
End: 2023-10-02

## 2023-10-02 ENCOUNTER — APPOINTMENT (OUTPATIENT)
Dept: INTERNAL MEDICINE | Facility: CLINIC | Age: 63
End: 2023-10-02
Payer: MEDICARE

## 2023-10-02 VITALS
HEART RATE: 56 BPM | BODY MASS INDEX: 24.8 KG/M2 | HEIGHT: 67 IN | TEMPERATURE: 97.6 F | WEIGHT: 158 LBS | OXYGEN SATURATION: 96 % | DIASTOLIC BLOOD PRESSURE: 77 MMHG | SYSTOLIC BLOOD PRESSURE: 149 MMHG

## 2023-10-02 DIAGNOSIS — I73.9 PERIPHERAL VASCULAR DISEASE, UNSPECIFIED: ICD-10-CM

## 2023-10-02 DIAGNOSIS — Z12.11 ENCOUNTER FOR SCREENING FOR MALIGNANT NEOPLASM OF COLON: ICD-10-CM

## 2023-10-02 DIAGNOSIS — R97.20 ELEVATED PROSTATE, SPECIFIC ANTIGEN [PSA]: ICD-10-CM

## 2023-10-02 PROCEDURE — 99215 OFFICE O/P EST HI 40 MIN: CPT

## 2023-10-02 RX ORDER — TEA TREE OIL 100 %
1-250 OIL (ML) TOPICAL
Qty: 90 | Refills: 2 | Status: COMPLETED | COMMUNITY
Start: 2023-08-02 | End: 2023-10-02

## 2023-10-02 RX ORDER — PNEUMOCOCCAL 20-VALENT CONJUGATE VACCINE 2.2; 2.2; 2.2; 2.2; 2.2; 2.2; 2.2; 2.2; 2.2; 2.2; 2.2; 2.2; 2.2; 2.2; 2.2; 2.2; 4.4; 2.2; 2.2; 2.2 UG/.5ML; UG/.5ML; UG/.5ML; UG/.5ML; UG/.5ML; UG/.5ML; UG/.5ML; UG/.5ML; UG/.5ML; UG/.5ML; UG/.5ML; UG/.5ML; UG/.5ML; UG/.5ML; UG/.5ML; UG/.5ML; UG/.5ML; UG/.5ML; UG/.5ML; UG/.5ML
INJECTION, SUSPENSION INTRAMUSCULAR
Qty: 1 | Refills: 0 | Status: COMPLETED | COMMUNITY
Start: 2023-07-27 | End: 2023-10-02

## 2023-10-02 RX ORDER — ESOMEPRAZOLE MAGNESIUM 40 MG/1
40 CAPSULE, DELAYED RELEASE ORAL
Qty: 42 | Refills: 0 | Status: COMPLETED | COMMUNITY
Start: 2023-08-03 | End: 2023-10-02

## 2023-10-02 RX ORDER — VALACYCLOVIR 1 G/1
1 TABLET, FILM COATED ORAL
Qty: 21 | Refills: 0 | Status: COMPLETED | COMMUNITY
Start: 2023-09-19

## 2023-10-02 RX ORDER — AMOXICILLIN 500 MG/1
500 CAPSULE ORAL TWICE DAILY
Qty: 56 | Refills: 0 | Status: COMPLETED | COMMUNITY
Start: 2023-08-02 | End: 2023-10-02

## 2023-10-02 RX ORDER — CLARITHROMYCIN 500 MG/1
500 TABLET, FILM COATED ORAL TWICE DAILY
Qty: 28 | Refills: 0 | Status: COMPLETED | COMMUNITY
Start: 2023-08-02 | End: 2023-10-02

## 2023-10-03 LAB
ANION GAP SERPL CALC-SCNC: 14 MMOL/L
APPEARANCE: CLEAR
BASOPHILS # BLD AUTO: 0.02 K/UL
BASOPHILS NFR BLD AUTO: 0.3 %
BILIRUBIN URINE: NEGATIVE
BLOOD URINE: NEGATIVE
BUN SERPL-MCNC: 12 MG/DL
CALCIUM SERPL-MCNC: 10 MG/DL
CHLORIDE SERPL-SCNC: 106 MMOL/L
CO2 SERPL-SCNC: 24 MMOL/L
COLOR: YELLOW
CREAT SERPL-MCNC: 1.08 MG/DL
EGFR: 76 ML/MIN/1.73M2
EOSINOPHIL # BLD AUTO: 0.11 K/UL
EOSINOPHIL NFR BLD AUTO: 1.7 %
GLUCOSE QUALITATIVE U: NEGATIVE MG/DL
GLUCOSE SERPL-MCNC: 86 MG/DL
HCT VFR BLD CALC: 46.2 %
HGB BLD-MCNC: 15.4 G/DL
IMM GRANULOCYTES NFR BLD AUTO: 0.2 %
KETONES URINE: NEGATIVE MG/DL
LEUKOCYTE ESTERASE URINE: ABNORMAL
LYMPHOCYTES # BLD AUTO: 3.59 K/UL
LYMPHOCYTES NFR BLD AUTO: 56.8 %
MAN DIFF?: NORMAL
MCHC RBC-ENTMCNC: 32.5 PG
MCHC RBC-ENTMCNC: 33.3 GM/DL
MCV RBC AUTO: 97.5 FL
MONOCYTES # BLD AUTO: 0.5 K/UL
MONOCYTES NFR BLD AUTO: 7.9 %
NEUTROPHILS # BLD AUTO: 2.09 K/UL
NEUTROPHILS NFR BLD AUTO: 33.1 %
NITRITE URINE: NEGATIVE
PH URINE: 6
PLATELET # BLD AUTO: 176 K/UL
POTASSIUM SERPL-SCNC: 4.4 MMOL/L
PROTEIN URINE: NEGATIVE MG/DL
PSA SERPL-MCNC: 4.24 NG/ML
RBC # BLD: 4.74 M/UL
RBC # FLD: 14.2 %
SODIUM SERPL-SCNC: 144 MMOL/L
SPECIFIC GRAVITY URINE: 1.02
UROBILINOGEN URINE: 1 MG/DL
WBC # FLD AUTO: 6.32 K/UL

## 2023-10-04 LAB — UREA BREATH TEST QL: NEGATIVE

## 2023-10-06 ENCOUNTER — RESULT REVIEW (OUTPATIENT)
Age: 63
End: 2023-10-06

## 2023-10-06 ENCOUNTER — TRANSCRIPTION ENCOUNTER (OUTPATIENT)
Age: 63
End: 2023-10-06

## 2023-10-06 ENCOUNTER — OUTPATIENT (OUTPATIENT)
Dept: OUTPATIENT SERVICES | Facility: HOSPITAL | Age: 63
LOS: 1 days | End: 2023-10-06
Payer: MEDICARE

## 2023-10-06 ENCOUNTER — APPOINTMENT (OUTPATIENT)
Dept: INTERNAL MEDICINE | Facility: HOSPITAL | Age: 63
End: 2023-10-06

## 2023-10-06 VITALS
OXYGEN SATURATION: 100 % | RESPIRATION RATE: 14 BRPM | HEIGHT: 67 IN | WEIGHT: 156.97 LBS | TEMPERATURE: 98 F | SYSTOLIC BLOOD PRESSURE: 129 MMHG | DIASTOLIC BLOOD PRESSURE: 85 MMHG | HEART RATE: 57 BPM

## 2023-10-06 VITALS
SYSTOLIC BLOOD PRESSURE: 114 MMHG | DIASTOLIC BLOOD PRESSURE: 77 MMHG | RESPIRATION RATE: 17 BRPM | HEART RATE: 63 BPM | OXYGEN SATURATION: 99 %

## 2023-10-06 DIAGNOSIS — R10.13 EPIGASTRIC PAIN: ICD-10-CM

## 2023-10-06 DIAGNOSIS — R10.9 UNSPECIFIED ABDOMINAL PAIN: ICD-10-CM

## 2023-10-06 DIAGNOSIS — S99.929A UNSPECIFIED INJURY OF UNSPECIFIED FOOT, INITIAL ENCOUNTER: Chronic | ICD-10-CM

## 2023-10-06 DIAGNOSIS — F17.210 NICOTINE DEPENDENCE, CIGARETTES, UNCOMPLICATED: ICD-10-CM

## 2023-10-06 DIAGNOSIS — R97.20 ELEVATED PROSTATE SPECIFIC ANTIGEN [PSA]: ICD-10-CM

## 2023-10-06 DIAGNOSIS — Z72.0 TOBACCO USE: ICD-10-CM

## 2023-10-06 PROCEDURE — 43239 EGD BIOPSY SINGLE/MULTIPLE: CPT

## 2023-10-06 PROCEDURE — 45380 COLONOSCOPY AND BIOPSY: CPT

## 2023-10-06 PROCEDURE — 88312 SPECIAL STAINS GROUP 1: CPT

## 2023-10-06 PROCEDURE — 88305 TISSUE EXAM BY PATHOLOGIST: CPT | Mod: 26

## 2023-10-06 PROCEDURE — 88312 SPECIAL STAINS GROUP 1: CPT | Mod: 26

## 2023-10-06 PROCEDURE — 88305 TISSUE EXAM BY PATHOLOGIST: CPT

## 2023-10-06 RX ORDER — SODIUM CHLORIDE 9 MG/ML
500 INJECTION INTRAMUSCULAR; INTRAVENOUS; SUBCUTANEOUS
Refills: 0 | Status: COMPLETED | OUTPATIENT
Start: 2023-10-06 | End: 2023-10-06

## 2023-10-06 RX ADMIN — SODIUM CHLORIDE 30 MILLILITER(S): 9 INJECTION INTRAMUSCULAR; INTRAVENOUS; SUBCUTANEOUS at 11:43

## 2023-10-06 NOTE — ASU PATIENT PROFILE, ADULT - FALL HARM RISK - UNIVERSAL INTERVENTIONS
Bed in lowest position, wheels locked, appropriate side rails in place/Call bell, personal items and telephone in reach/Instruct patient to call for assistance before getting out of bed or chair/Non-slip footwear when patient is out of bed/[place] to call system/Physically safe environment - no spills, clutter or unnecessary equipment/Purposeful Proactive Rounding/Room/bathroom lighting operational, light cord in reach

## 2023-10-06 NOTE — ASU DISCHARGE PLAN (ADULT/PEDIATRIC) - NS MD DC FALL RISK RISK
For information on Fall & Injury Prevention, visit: [email or web address] OR  [email or web address] OR  https://www.cdc.gov/steadi/patient.html

## 2023-10-09 ENCOUNTER — TRANSCRIPTION ENCOUNTER (OUTPATIENT)
Age: 63
End: 2023-10-09

## 2023-10-10 ENCOUNTER — TRANSCRIPTION ENCOUNTER (OUTPATIENT)
Age: 63
End: 2023-10-10

## 2023-10-10 LAB — SURGICAL PATHOLOGY STUDY: SIGNIFICANT CHANGE UP

## 2023-10-11 ENCOUNTER — TRANSCRIPTION ENCOUNTER (OUTPATIENT)
Age: 63
End: 2023-10-11

## 2023-10-11 RX ORDER — ESOMEPRAZOLE MAGNESIUM 40 MG/1
40 CAPSULE, DELAYED RELEASE ORAL
Qty: 30 | Refills: 3 | Status: ACTIVE | COMMUNITY
Start: 2023-10-06 | End: 1900-01-01

## 2023-10-13 ENCOUNTER — TRANSCRIPTION ENCOUNTER (OUTPATIENT)
Age: 63
End: 2023-10-13

## 2023-10-16 RX ORDER — TADALAFIL 5 MG/1
5 TABLET ORAL
Qty: 90 | Refills: 1 | Status: ACTIVE | COMMUNITY
Start: 2023-08-24 | End: 1900-01-01

## 2023-10-17 ENCOUNTER — RESULT REVIEW (OUTPATIENT)
Age: 63
End: 2023-10-17

## 2023-10-17 ENCOUNTER — TRANSCRIPTION ENCOUNTER (OUTPATIENT)
Age: 63
End: 2023-10-17

## 2023-10-17 ENCOUNTER — APPOINTMENT (OUTPATIENT)
Dept: RADIOLOGY | Facility: CLINIC | Age: 63
End: 2023-10-17
Payer: MEDICARE

## 2023-10-17 ENCOUNTER — APPOINTMENT (OUTPATIENT)
Dept: MRI IMAGING | Facility: CLINIC | Age: 63
End: 2023-10-17
Payer: MEDICARE

## 2023-10-17 PROCEDURE — 71045 X-RAY EXAM CHEST 1 VIEW: CPT | Mod: 26

## 2023-10-17 PROCEDURE — 74018 RADEX ABDOMEN 1 VIEW: CPT | Mod: 26

## 2023-10-17 RX ORDER — ESOMEPRAZOLE MAGNESIUM 40 MG/1
40 CAPSULE, DELAYED RELEASE ORAL DAILY
Qty: 90 | Refills: 3 | Status: ACTIVE | COMMUNITY
Start: 2023-10-17 | End: 1900-01-01

## 2023-10-19 ENCOUNTER — NON-APPOINTMENT (OUTPATIENT)
Age: 63
End: 2023-10-19

## 2023-10-20 ENCOUNTER — APPOINTMENT (OUTPATIENT)
Age: 63
End: 2023-10-20
Payer: MEDICARE

## 2023-10-20 VITALS
BODY MASS INDEX: 25.9 KG/M2 | OXYGEN SATURATION: 97 % | HEIGHT: 67 IN | HEART RATE: 58 BPM | DIASTOLIC BLOOD PRESSURE: 79 MMHG | SYSTOLIC BLOOD PRESSURE: 145 MMHG | WEIGHT: 165 LBS

## 2023-10-20 DIAGNOSIS — M48.02 SPINAL STENOSIS, CERVICAL REGION: ICD-10-CM

## 2023-10-20 DIAGNOSIS — M54.16 RADICULOPATHY, LUMBAR REGION: ICD-10-CM

## 2023-10-20 DIAGNOSIS — M48.07 SPINAL STENOSIS, LUMBOSACRAL REGION: ICD-10-CM

## 2023-10-20 PROCEDURE — 99214 OFFICE O/P EST MOD 30 MIN: CPT

## 2023-11-21 NOTE — DISCHARGE NOTE ADULT - CAREGIVER PHONE NUMBER
"[facility]iatry [facility] Cover Note    Subjective:   Notified by staff that pt had become upset after learning they could not see their dog that their friend was bringing tonight. They reported that patient was hitting the walls and asked that Prakash be seen. Met with Prakash on the unit, he was agreeable to talking but said he would like to continue pacing. Reports he is currently feeling \"anxious, irritated\". He reports that he got upset earlier due to not being able to see his dog when he was under the impression that this visit had already been approved earlier today. This was really dysregulating for him and led to him punching the window with both his right and left hand. He reports that both hands are now in pain and throbbing, and that he has previously broken his right hand \"in three different places\". He reported receiving some tylenol but asked if he could have additional medication for pain control. Was agreeable to getting imaging done and to go back to having an SIO for the time being, per nursing concern.     Objective:   /85 (BP Location: Right arm, Patient Position: Sitting, Cuff Size: Adult Regular)   Pulse 89   Temp 97.8  F (36.6  C) (Oral)   Resp 16   Ht 1.702 m (5' 7\")   Wt 107.2 kg (236 lb 6.4 oz)   SpO2 96%   BMI 37.03 kg/m      Right hand: significant soft tissue swelling along knuckles on top surface of hand  Left hand: general erythema noted, however no significant swelling  - range of motion decreased in bilateral hands, unable to splay fingers due to pain    Assessment/Plan:  Imaging:   - bilateral hand xrays (imaging still pending as of 2258. Followed up multiple times with x-ray and they are aware about order.)    Due to pt on complex medication regimen, both medicine and pharmacy were curbsided to aid in recommendations for acute pain management.   - discussed NSAID option with pharmacy due to pt already taking lithium. Pharmacy recommended sulindac 200mg BID PRN   - discussed with " [phone] on-call medicine provider as well who agreed to follow-up imaging results and recommended that if hand is broken or pt in severe pain, could give 1 dose of 5mg oxycodone and reassess.     María Elena Amanda MD  PGY-2 Psychiatry Resident

## 2023-11-27 ENCOUNTER — APPOINTMENT (OUTPATIENT)
Dept: INTERNAL MEDICINE | Facility: CLINIC | Age: 63
End: 2023-11-27
Payer: MEDICARE

## 2023-11-27 VITALS
HEIGHT: 67 IN | BODY MASS INDEX: 26.84 KG/M2 | SYSTOLIC BLOOD PRESSURE: 151 MMHG | HEART RATE: 64 BPM | WEIGHT: 171 LBS | OXYGEN SATURATION: 98 % | DIASTOLIC BLOOD PRESSURE: 85 MMHG | TEMPERATURE: 97.9 F

## 2023-11-27 VITALS — SYSTOLIC BLOOD PRESSURE: 136 MMHG | DIASTOLIC BLOOD PRESSURE: 74 MMHG

## 2023-11-27 DIAGNOSIS — I10 ESSENTIAL (PRIMARY) HYPERTENSION: ICD-10-CM

## 2023-11-27 DIAGNOSIS — K82.4 CHOLESTEROLOSIS OF GALLBLADDER: ICD-10-CM

## 2023-11-27 DIAGNOSIS — K29.00 ACUTE GASTRITIS W/OUT BLEEDING: ICD-10-CM

## 2023-11-27 DIAGNOSIS — E55.9 VITAMIN D DEFICIENCY, UNSPECIFIED: ICD-10-CM

## 2023-11-27 DIAGNOSIS — N40.0 BENIGN PROSTATIC HYPERPLASIA WITHOUT LOWER URINARY TRACT SYMPMS: ICD-10-CM

## 2023-11-27 DIAGNOSIS — K64.8 OTHER HEMORRHOIDS: ICD-10-CM

## 2023-11-27 DIAGNOSIS — Z87.891 PERSONAL HISTORY OF NICOTINE DEPENDENCE: ICD-10-CM

## 2023-11-27 PROCEDURE — 99215 OFFICE O/P EST HI 40 MIN: CPT

## 2023-11-27 RX ORDER — HYDROCORTISONE ACETATE 25 MG/1
25 SUPPOSITORY RECTAL
Qty: 20 | Refills: 0 | Status: COMPLETED | COMMUNITY
Start: 2023-10-06 | End: 2023-11-27

## 2023-11-27 RX ORDER — CHOLECALCIFEROL (VITAMIN D3) 1250 MCG
1.25 MG CAPSULE ORAL
Qty: 12 | Refills: 0 | Status: COMPLETED | COMMUNITY
Start: 2023-08-02 | End: 2023-11-27

## 2023-11-27 RX ORDER — BETAMETHASONE DIPROPIONATE 0.5 MG/G
0.05 OINTMENT, AUGMENTED TOPICAL
Qty: 15 | Refills: 0 | Status: COMPLETED | COMMUNITY
Start: 2023-09-19 | End: 2023-11-27

## 2023-11-27 RX ORDER — POLYETHYLENE GLYCOL 3350 17 G/17G
17 POWDER, FOR SOLUTION ORAL
Qty: 1 | Refills: 0 | Status: COMPLETED | COMMUNITY
Start: 2023-10-02 | End: 2023-11-27

## 2023-11-27 RX ORDER — AMLODIPINE BESYLATE 2.5 MG/1
2.5 TABLET ORAL
Qty: 30 | Refills: 3 | Status: ACTIVE | COMMUNITY
Start: 2023-11-27 | End: 1900-01-01

## 2023-11-27 RX ORDER — ASCORBIC ACID 500 MG
500 TABLET ORAL DAILY
Qty: 90 | Refills: 3 | Status: COMPLETED | COMMUNITY
Start: 2023-10-10 | End: 2023-11-27

## 2023-11-28 LAB
25(OH)D3 SERPL-MCNC: 45.4 NG/ML
ALBUMIN SERPL ELPH-MCNC: 4.7 G/DL
ALP BLD-CCNC: 133 U/L
ALT SERPL-CCNC: 25 U/L
ANION GAP SERPL CALC-SCNC: 13 MMOL/L
AST SERPL-CCNC: 22 U/L
BASOPHILS # BLD AUTO: 0.03 K/UL
BASOPHILS NFR BLD AUTO: 0.6 %
BILIRUB SERPL-MCNC: 0.4 MG/DL
BUN SERPL-MCNC: 16 MG/DL
CALCIUM SERPL-MCNC: 9.9 MG/DL
CHLORIDE SERPL-SCNC: 105 MMOL/L
CHOLEST SERPL-MCNC: 209 MG/DL
CO2 SERPL-SCNC: 23 MMOL/L
CREAT SERPL-MCNC: 1.07 MG/DL
EGFR: 77 ML/MIN/1.73M2
EOSINOPHIL # BLD AUTO: 0.04 K/UL
EOSINOPHIL NFR BLD AUTO: 0.8 %
ESTIMATED AVERAGE GLUCOSE: 126 MG/DL
FRUCTOSAMINE SERPL-MCNC: 276 UMOL/L
GLUCOSE SERPL-MCNC: 137 MG/DL
HBA1C MFR BLD HPLC: 6 %
HCT VFR BLD CALC: 46.6 %
HDLC SERPL-MCNC: 56 MG/DL
HGB BLD-MCNC: 15.9 G/DL
IMM GRANULOCYTES NFR BLD AUTO: 0.2 %
LDLC SERPL CALC-MCNC: 125 MG/DL
LYMPHOCYTES # BLD AUTO: 2.34 K/UL
LYMPHOCYTES NFR BLD AUTO: 48.4 %
MAN DIFF?: NORMAL
MCHC RBC-ENTMCNC: 32.4 PG
MCHC RBC-ENTMCNC: 34.1 GM/DL
MCV RBC AUTO: 94.9 FL
MONOCYTES # BLD AUTO: 0.41 K/UL
MONOCYTES NFR BLD AUTO: 8.5 %
NEUTROPHILS # BLD AUTO: 2 K/UL
NEUTROPHILS NFR BLD AUTO: 41.5 %
NONHDLC SERPL-MCNC: 153 MG/DL
PLATELET # BLD AUTO: 151 K/UL
POTASSIUM SERPL-SCNC: 4.4 MMOL/L
PROT SERPL-MCNC: 7.3 G/DL
RBC # BLD: 4.91 M/UL
RBC # FLD: 13.3 %
SODIUM SERPL-SCNC: 141 MMOL/L
TRIGL SERPL-MCNC: 163 MG/DL
WBC # FLD AUTO: 4.83 K/UL

## 2023-12-19 ENCOUNTER — APPOINTMENT (OUTPATIENT)
Dept: INTERNAL MEDICINE | Facility: CLINIC | Age: 63
End: 2023-12-19
Payer: MEDICARE

## 2023-12-19 VITALS
BODY MASS INDEX: 24.48 KG/M2 | OXYGEN SATURATION: 97 % | DIASTOLIC BLOOD PRESSURE: 83 MMHG | HEIGHT: 67 IN | WEIGHT: 156 LBS | HEART RATE: 81 BPM | SYSTOLIC BLOOD PRESSURE: 130 MMHG | TEMPERATURE: 97.2 F

## 2023-12-19 DIAGNOSIS — R10.9 UNSPECIFIED ABDOMINAL PAIN: ICD-10-CM

## 2023-12-19 DIAGNOSIS — R06.6 HICCOUGH: ICD-10-CM

## 2023-12-19 DIAGNOSIS — J44.9 CHRONIC OBSTRUCTIVE PULMONARY DISEASE, UNSPECIFIED: ICD-10-CM

## 2023-12-19 DIAGNOSIS — R11.2 NAUSEA WITH VOMITING, UNSPECIFIED: ICD-10-CM

## 2023-12-19 DIAGNOSIS — Z86.19 PERSONAL HISTORY OF OTHER INFECTIOUS AND PARASITIC DISEASES: ICD-10-CM

## 2023-12-19 PROCEDURE — 99214 OFFICE O/P EST MOD 30 MIN: CPT

## 2023-12-19 RX ORDER — BACLOFEN 5 MG/1
5 TABLET ORAL
Qty: 1 | Refills: 0 | Status: ACTIVE | COMMUNITY
Start: 2023-12-19 | End: 1900-01-01

## 2023-12-19 NOTE — PHYSICAL EXAM
[Alert] : alert [Normal Voice/Communication] : normal voice/communication [Normal Lips/Gums] : the lips and gums were normal [Oropharynx] : the oropharynx was normal [Normal Appearance] : the appearance of the neck was normal [Heart Rate And Rhythm] : heart rate was normal and rhythm regular [None] : no edema [Soft, Nontender] : the abdomen was soft and nontender [No Mass] : no masses were palpated [No HSM] : no hepatosplenomegaly noted [Cervical Lymph Nodes Enlarged Anterior Bilaterally] : no anterior cervical lymphadenopathy [Cervical Lymph Nodes Enlarged Posterior Bilaterally] : nodes not enlarged [Supraclavicular Lymph Nodes Enlarged Bilaterally] : nodes not enlarged [Axillary Lymph Nodes Enlarged Bilaterally] : nodes not enlarged [Inguinal Lymph Nodes Enlarged Bilaterally] : nodes not enlarged [No CVA Tenderness] : no CVA  tenderness [Normal Station and Gait] : the gait and station were normal [Normal Motor Tone] : the muscle tone was normal [Involuntary Movements] : no involuntary movements were seen [Normal Color / Pigmentation] : normal skin color and pigmentation [] : no rash [Normal Turgor] : normal skin turgor [Motor Exam] : the motor exam was normal [Normal] : oriented to person, place, and time

## 2023-12-19 NOTE — HISTORY OF PRESENT ILLNESS
[de-identified] : 10/6/23EGD Findings:    Esophagus Additional esophagus findings -proximal and mid esophagus was normal  distal normal but submucosal elevation noted throughout esophagus bx.    Stomach Mucosa Diffuse continuous friability, erythema and congestion of the  mucosa with no bleeding was noted in the fundus, stomach body, antrum, pylorus,  incisura of the stomach and pre-pyloric region. These findings are compatible  with acute gastritis. Bx 3 antrum 4 incisura, 5 lesser curvature 6 greater  curvature.Biopsies were performed.    Abnormal mucosa was noted in the cardia and fundus. Erythematous.    Duodenum Additional duodenum findings -first portion of duodenum erythema bx #1  second portion had white round small areas on mucosa bx taken # 2.    EGD Impressions:    -proximal and mid esophagus was normal distal normal but submucosal elevation  noted throughout esophagus bx.    Friability, erythema and congestion in the fundus, stomach body, antrum,  pylorus, incisura of the stomach and pre-pyloric region compatible with acute  gastritis. (Biopsy).    -first portion of duodenum erythema bx #1 second portion had white round small  areas on mucosa bx taken # 2.    Abnormal mucosa in the cardia and fundus.   [FreeTextEntry1] : 10/6/23Left Colon: Good, 2 Transverse Colon: Good, 2  Right Colon: Good, 2    Colonoscopy Findings:    Excavated lesions A few non-bleeding diverticula with small openings were seen  in the the left side of the colon. Diverticulosis appeared to be of mild  severity.    Protruding lesions A single sessile non-bleeding polyp of benign appearance was  found in the rectum. Bx 8.A single-piece polypectomy was performed using a cold  forceps. The polyp was completely removed. Biopsies were performed.    Large non-bleeding grade/stage III internal hemorrhoids were noted. On  hemorrhoid appeared to have a papillomatous like lesion onn the hemmorhoid.    Additional findings The colon was otherwise unremarkable except what was stated.      Colonoscopy Impressions:    Mild diverticulosis of the the left side of the colon.    Polyp in the rectum. (Polypectomy, Biopsy).    Grade/Stage III internal hemorrhoids.    The colon was otherwise unremarkable except what was stated.

## 2023-12-19 NOTE — ASSESSMENT
[FreeTextEntry1] : hiccups  Physical maneuvers appear to terminate hiccup bouts in many patients and are simple and generally safe to perform [4]. These maneuvers are designed to interrupt normal respiratory function to cause hypercapnia, stimulate/irritate the nasopharynx or uvula, increase vagal stimulation, or relieve irritation of the diaphragmBreath holding for 5 to 10 seconds (or as tolerated).  Performing Valsalva maneuver, holding for five seconds.  Sipping on or gargling with very cold water.  Biting into a lemon.  Pulling on the tongue.  Swallowing a teaspoon of dry sugar.  Pressing gently but firmly on the eyeballs.  While sitting, pulling the knees up to the chest (or leaning forward to compress the chest); hold the position for 30 seconds to one minute if possible.  Drinking water through a rigid tube with a valve that requires significant suction effort (ie, a forced inspiratory suction and swallow tool [FISST]), which contracts the diaphragm by activating the phrenic nerve, followed by swallowing of water which activates the vagus nerve. The simultaneous activation of the two nerves is believed to be the mechanism responsible for terminating hiccups [55].  The efficacy of these maneuvers has been suggested only by case reports or observational studies and has not been confirmed. However, they are easy to perform and have low risks of complications, unless there is a contraindication (eg, recent surgery on an organ involved in the physical maneuver).  Prolonged hiccups -- Hiccups continuing for 48 hours to one month are labeled "persistent" and those continuing for more than one month are called "intractable" [8].  Assessment  Hiccups lasting more than 48 hours generally warrant evaluation to look for an etiology. However, for patients with advanced malignancy, relieving symptoms with medication should be the focus, rather than extensive testing for etiologies. (See 'Pharmacotherapy' below.)  Initial assessment  For patients not known to have an advanced malignancy and who experience hiccups lasting more than 48 hours, a thorough history, physical examination, and some laboratory testing should be performed to try to determine the underlying etiology, even though in many patients a specific cause is not found [56]. Along with the initial assessment, the patient may initiate physical maneuvers; they may be helpful and are unlikely to cause harm. It is appropriate to initiate empiric therapy for symptom relief while the patient undergoes evaluation. (See 'Initial medications' below and 'Physical maneuvers' above.)  During the history, the clinician should determine how the hiccups are affecting activities of daily living (eg, eating, drinking, sleeping), obtain a complete list of medications to identify any that are associated with hiccups, elicit concomitant symptoms and evaluate for medical conditions known to be associated with hiccups, and ask about recent thoracic or abdominal surgery or general anesthesia. If the patient is taking a potentially causative medication, it should be discontinued if possible. For example, dexamethasone may be switched to methylprednisolone since dexamethasone is recognized as a cause of hiccups [47]. (See 'Medication-induced' above and 'Etiologies' above.)  The physical examination should include visualization of the external auditory canals looking for an irritant (eg, infection or foreign body), head and neck examination for thyromegaly, lymphadenopathy, or evidence of pharyngeal irritation suggestive of gastroesophageal reflux disease (GERD), neurologic examination including cranial nerve assessment, auscultation of the chest, and palpation of the abdomen for a mass.  If an etiology has not been determined by history and physical examination, laboratory testing is warranted, including electrolytes, blood urea nitrogen (BUN), creatinine, calcium, liver function tests and, if abdominal symptoms are present, amylase and lipase.  Subsequent evaluation  If an etiology is not determined during this assessment, initiating empiric pharmacotherapy is warranted. However, if there are additional signs or symptoms to evaluate, further testing is indicated (eg, evaluation of wheezing, dyspnea or abnormal imaging with chest radiography, pulmonary function testing, computed tomography (CT) scanning and/or bronchoscopy; evaluation of potential central nervous system causes with brain magnetic resonance imaging (MRI) and/or lumbar puncture; evaluation of esophageal symptoms with upper endoscopy and/or esophageal manometry). Typically, such testing is prioritized according to any suggestive features in the initial assessment. However, if hiccups are refractory to treatments and no cause has been identified, then more investigations may be required; such cases are rare.auses of persistent hiccups include central nervous system disease, gastroesophageal disease (including reflux), post-operative phrenic nerve irritation, intrathoracic or neck mass, foreign body in the external ear canal, acute heart disease, intra-abdominal processes, emotional stress/excitement, metabolic derangements including kidney failure and electrolyte abnormalities, and medications (eg, dexamethasone, diazepam, midazolam, barbiturates, tramadol, alpha methyl dopa, and certain anticancer drugs such as levofolinate, fluorouracil, oxaliplatin, carboplatin, irinotecan).   Selection of PPI is based on patient and clinician preference, and standard GERD dosing is used. Refer to UpToDate content on management of GERD in adults. He is on nexium  and has hiccups so next would be to try baclofen 5-10 mg tid   or gabapentin.    He states he has had this as a child  He wa asked to hold his tongue  for  30 seconds and bring his knees to this chest .    and so far no hiccups . He has been under anxiety and stress  and could also be a factor.   2.  gerd  Gastroesophageal reflux disease (GERD) occurs when stomach acid frequently flows back into the tube connecting your mouth and stomach (esophagus). This backwash (acid reflux) can irritate the lining of your esophagus.  Many people experience acid reflux from time to time. GERD is mild acid reflux that occurs at least twice a week, or moderate to severe acid reflux that occurs at least once a week.   Most people can manage the discomfort of GERD with lifestyle changes and over-the-counter medications. But some people with GERD may need stronger medications or surgery to ease symptoms.  Symptoms  Common signs and symptoms of GERD include: A burning sensation in your chest (heartburn), usually after eating, which might be worse at night Chest pain Difficulty swallowing Regurgitation of food or sour liquid Sensation of a lump in your throat  If you have nighttime acid reflux, you might also experience: Chronic cough Laryngitis New or worsening asthma Disrupted sleep  When to see a doctor  Seek immediate medical care if you have chest pain, especially if you also have shortness of breath, or jaw or arm pain. These may be signs and symptoms of a heart attack.  Make an appointment with your doctor if you: Experience severe or frequent GERD symptoms Take over-the-counter medications for heartburn more than twice a week  Causes  GERD is caused by frequent acid reflux.  When you swallow, a circular band of muscle around the bottom of your esophagus (lower esophageal sphincter) relaxes to allow food and liquid to flow into your stomach. Then the sphincter closes again.  If the sphincter relaxes abnormally or weakens, stomach acid can flow back up into your esophagus. This constant backwash of acid irritates the lining of your esophagus, often causing it to become inflamed.  Risk factors  Conditions that can increase your risk of GERD include: Obesity Bulging of the top of the stomach up into the diaphragm (hiatal hernia) Pregnancy Connective tissue disorders, such as scleroderma Delayed stomach emptying  Factors that can aggravate acid reflux include: Smoking Eating large meals or eating late at night Eating certain foods (triggers) such as fatty or fried foods Drinking certain beverages, such as alcohol or coffee Taking certain medications, such as aspirin  Complications  Over time, chronic inflammation in your esophagus can cause: Narrowing of the esophagus (esophageal stricture). Damage to the lower esophagus from stomach acid causes scar tissue to form. The scar tissue narrows the food pathway, leading to problems with swallowing. An open sore in the esophagus (esophageal ulcer). Stomach acid can wear away tissue in the esophagus, causing an open sore to form. An esophageal ulcer can bleed, cause pain and make swallowing difficult. Precancerous changes to the esophagus (Watkins's esophagus). Damage from acid can cause changes in the tissue lining the lower esophagus. These changes are associated with an increased risk of esophageal cancer 3  vomiting  has stopped .but likely stimulated the vagus nerve and caused the hiccups.   or diaphragmatic. 4 copd  -COPD is a progressive disease and although it cant be cured , appropriate management can slow its progression, reduce frequency and severity of exacerbations, and improve symptoms and the patient quality of life. Hospitalizations are the greatest contributor to the total COPD costs and account for up to 87% of total COPD related costs. Exacerbations are the main cause of admissions and subsequently account for the 40-75% of COPD costs. Inhaled maintenance therapy reduces the incidence of exacerbations in patients with stable COPD. Incorrect inhaler use and nonadherence are major obstacles to achieving COPD treatment goals. Many COPD patients have challenges (impaired inhalation, limited dexterity, reduced cognition: that limit their ability to correctly use their COPD treatment devices resulting in reduced symptom control. Of most importance is smoking cessation and early intervention with respiratory illnesses and contemplation for pulmonary rehab to enhance quality of life.

## 2023-12-20 LAB
ALBUMIN SERPL ELPH-MCNC: 4.8 G/DL
ALP BLD-CCNC: 113 U/L
ALT SERPL-CCNC: 24 U/L
AMYLASE/CREAT SERPL: 106 U/L
ANION GAP SERPL CALC-SCNC: 17 MMOL/L
AST SERPL-CCNC: 22 U/L
BASOPHILS # BLD AUTO: 0.02 K/UL
BASOPHILS NFR BLD AUTO: 0.3 %
BILIRUB SERPL-MCNC: 0.8 MG/DL
BUN SERPL-MCNC: 15 MG/DL
CALCIUM SERPL-MCNC: 10.1 MG/DL
CHLORIDE SERPL-SCNC: 101 MMOL/L
CO2 SERPL-SCNC: 23 MMOL/L
CREAT SERPL-MCNC: 1.28 MG/DL
EGFR: 62 ML/MIN/1.73M2
EOSINOPHIL # BLD AUTO: 0.02 K/UL
EOSINOPHIL NFR BLD AUTO: 0.3 %
GLUCOSE SERPL-MCNC: 126 MG/DL
HCT VFR BLD CALC: 46.8 %
HGB BLD-MCNC: 16.5 G/DL
IMM GRANULOCYTES NFR BLD AUTO: 0.3 %
LPL SERPL-CCNC: 74 U/L
LYMPHOCYTES # BLD AUTO: 1.9 K/UL
LYMPHOCYTES NFR BLD AUTO: 29.9 %
MAN DIFF?: NORMAL
MCHC RBC-ENTMCNC: 32.2 PG
MCHC RBC-ENTMCNC: 35.3 GM/DL
MCV RBC AUTO: 91.4 FL
MONOCYTES # BLD AUTO: 0.58 K/UL
MONOCYTES NFR BLD AUTO: 9.1 %
NEUTROPHILS # BLD AUTO: 3.81 K/UL
NEUTROPHILS NFR BLD AUTO: 60.1 %
PLATELET # BLD AUTO: 342 K/UL
POTASSIUM SERPL-SCNC: 4.2 MMOL/L
PROT SERPL-MCNC: 7.5 G/DL
RBC # BLD: 5.12 M/UL
RBC # FLD: 12.1 %
SODIUM SERPL-SCNC: 140 MMOL/L
WBC # FLD AUTO: 6.35 K/UL

## 2023-12-27 ENCOUNTER — TRANSCRIPTION ENCOUNTER (OUTPATIENT)
Age: 63
End: 2023-12-27

## 2023-12-29 NOTE — ASU PREOP CHECKLIST - RESPIRATORY RATE (BREATHS/MIN)
Infusion Nursing Note:  Jennifer Cervantes presents today for pain medications and IV fluids.    Patient seen by provider today: No   present during visit today: Not Applicable.    Note:   Pt received a liter of LR at 500 ml/hr for two hours.    Pt received 30 mg IV toradol, 8 mg po zofran, and 25 mg po benadryl.    Pt received 1 mg dilaudid IV every hour for a total of 3 doses (3 mg dilaudid total).      Intravenous Access:  Implanted Port.    Treatment Conditions:  Not Applicable.      Post Infusion Assessment:  Patient tolerated infusion without incident.  Blood return noted pre and post infusion.  Site patent and intact, free from redness, edema or discomfort.  No evidence of extravasations.  Access discontinued per protocol.       Discharge Plan:   Discharge instructions reviewed with: Patient.  Patient and/or family verbalized understanding of discharge instructions and all questions answered.  Patient discharged in stable condition accompanied by: self.  Departure Mode: Ambulatory.      Jamaica Naoples RN     16

## 2024-01-03 ENCOUNTER — APPOINTMENT (OUTPATIENT)
Dept: INTERNAL MEDICINE | Facility: CLINIC | Age: 64
End: 2024-01-03
Payer: MEDICARE

## 2024-01-03 VITALS
SYSTOLIC BLOOD PRESSURE: 136 MMHG | TEMPERATURE: 97.8 F | HEART RATE: 63 BPM | BODY MASS INDEX: 25.84 KG/M2 | DIASTOLIC BLOOD PRESSURE: 77 MMHG | OXYGEN SATURATION: 97 % | WEIGHT: 165 LBS

## 2024-01-03 DIAGNOSIS — K31.A0 GASTRIC INTESTINAL METAPLASIA, UNSPECIFIED: ICD-10-CM

## 2024-01-03 DIAGNOSIS — D12.6 BENIGN NEOPLASM OF COLON, UNSPECIFIED: ICD-10-CM

## 2024-01-03 DIAGNOSIS — Z01.818 ENCOUNTER FOR OTHER PREPROCEDURAL EXAMINATION: ICD-10-CM

## 2024-01-03 DIAGNOSIS — K29.60 OTHER GASTRITIS W/OUT BLEEDING: ICD-10-CM

## 2024-01-03 DIAGNOSIS — R10.13 EPIGASTRIC PAIN: ICD-10-CM

## 2024-01-03 DIAGNOSIS — F17.210 NICOTINE DEPENDENCE, CIGARETTES, UNCOMPLICATED: ICD-10-CM

## 2024-01-03 DIAGNOSIS — K76.0 FATTY (CHANGE OF) LIVER, NOT ELSEWHERE CLASSIFIED: ICD-10-CM

## 2024-01-03 PROCEDURE — 99214 OFFICE O/P EST MOD 30 MIN: CPT | Mod: 25

## 2024-01-03 RX ORDER — MULTIVIT-MIN/FOLIC/VIT K/LYCOP 400-300MCG
1000 TABLET ORAL
Refills: 0 | Status: ACTIVE | COMMUNITY

## 2024-01-03 NOTE — ASSESSMENT
[FreeTextEntry1] : 1 interspinal metaplasia    .Globally, gastric cancer is the third leading cause of cancer mortality and the leading cause of infection-associated cancers. Gastric intestinal metaplasia (GIM) is an intermediate precancerous gastric lesion in the gastric cancer cascade of chronic gastritis, atrophic gastritis, intestinal metaplasia (IM), dysplasia, and adenocarcinoma . Although the risk of gastric cancer is increased in patients with GIM, the absolute risk is modest. Specific subsets of patients with GIM may be at higher risk for progression.  This topic will review the epidemiology, natural history, diagnosis, and management of distal (noncardia) GIM. Screening for gastric cancer and the management of IM at the gastric cardia (located immediately distal to the squamocolumnar junction and proximal to the oxyntic mucosa) and its distinction from Watkins's esophagus are discussed in detail separately. (See "Watkins's esophagus: Epidemiology, clinical manifestations, and diagnosis", section on 'Intestinal metaplasia at GEJ' and "Early gastric cancer: Epidemiology, clinical manifestations, diagnosis, and staging" and "Early gastric cancer: Treatment, natural history, and prognosis" and "Gastric cancer screening".)    TERMINOLOGY GIM is defined as the replacement of the surface, foveolar, and glandular epithelium in the oxyntic or antral mucosa by intestinal epithelium   Classification  Several GIM classification systems are in use .   ?GIM topographic extent  Based on the extent of gastric involvement on histology, GIM is classified as extensive versus limited]:   Extensive IM  GIM involving the corpus and either the antrum and/or incisura angularis. GIM of the corpus alone is also considered a surrogate for extensive metaplasia, as antral metaplasia is usually also present, but may have been missed on biopsy given the patchy distribution.    Limited IM  GIM involving the antrum or incisura.   ?GIM histologic subtypes    Based on the histology with hematoxylin and eosin staining (H&E stain):    -Complete IM is defined by the presence of small intestinal-type mucosa with goblet cells that secrete sialomucins, a brush border, and eosinophilic enterocytes ).   -Incomplete IM is defined by the presence of colonic-type epithelium with multiple, irregular mucin droplets of variable size in the cytoplasm and absence of a brush border (p).   Based on mucin expression  IM has also been subtyped based on mucin expression, but its use is mainly limited to research settings. In IM, the original gastric mucins, which have a neutral pH, are replaced by acid mucins, which may be sialic or sulfated. Using a combination Alcian blue and high-iron diamine stain, GIM is classified based on the types of mucins expressed and morphology into the following types    -Type I (complete) IM expresses only sialomucins  -Type II (incomplete) IM expresses a mixture of gastric (neutral) mucins and intestinal sialomucins  -Type III (incomplete) IM expresses sulfomucins   Combined periodic acid Lynsey-AB staining may also be used to discriminate between normal epithelium and IM [9]. It is unclear whether this approach adds to risk stratification beyond the improved identification of incomplete Im]. Immunohistochemical stains may also be used for GIM confirmation (eg, MUC2).    ?Updated Maria L system  The operative link for gastritis assessment (JAVAN) and operative link on GIM (OLGIM) staging systems are used to evaluate the severity and extent of atrophy and IM. The severity of gastric atrophy and IM are graded in the antrum/incisura and corpus. JAVAN and OLGIM are primarily used in the research setting. Stages III and IV represent more severe extensive disease and correlate with a higher risk of progression]. (See "Metaplastic (chronic) atrophic gastritis", section on 'Diagnosis'.)    EPIDEMIOLOGY AND PATHOGENESIS Estimates of the global prevalence of GIM vary widely due to the variations in gastric cancer incidence ]. GIM prevalence in high-incidence regions ranges between 19 to 24 percent. In low gastric cancer incidence regions such as the [place] and in [place], GIM prevalence is approximately 5 percent ]. Prevalence of GIM increases with age and is higher in areas with high prevalence of Helicobacter pylori (H. pylori), in men, and in current smokers . (See "Pathology and molecular pathogenesis of gastric cancer", section on 'Models of molecular pathogenesis' and "Epidemiology of gastric cancer".)  GIM is a premalignant stage in the gastric cancer cascade through a series of well-defined and recognizable precursors . The gastric adenocarcinoma multistage model, the "Glaser cascade," suggests that a combination of host genetic factors and responses, H. pylori genomics, with modulation by dietary and environmental factors, predispose to early pan-gastric mucosal inflammation, which in turn results in gastric atrophy, IM, dysplasia, and adenocarcinoma. This model is most applicable to the intestinal subtype of gastric cancer. While there may be reversal of gastric atrophy with H. pylori eradication, GIM is considered as a "point of no return" in the multistage model, although this remains an ongoing debate.   The metaplastic foci often first appear at the antrum-corpus junction, and frequently at the incisura angularis. As the process advances, the foci enlarge and coalesce, extending to the neighboring mucosa in both the antrum and the corpus . As atrophic and metaplastic glands replace original glands, normal gastric secretions decrease, leading to hypochlorhydria and to low levels of pepsinogen produced by the chief cells of the corpus, and to high circulating levels of gastrin produced by antral G cells. The first metaplastic glands seen in the gastric mucosa phenotypically resemble those of the small intestine, with eosinophilic absorptive enterocytes with a brush border, alternating with mucus-producing goblet cells (complete or small IM). More advanced stages are characterized by phenotypic changes that are similar to colonic mucosa with the glands being lined by irregular goblet cells (incomplete or colonic metaplasia). In a subset of patients, foci of dysplasia may develop within areas of IM. (See "Pathology and molecular pathogenesis of gastric cancer", section on 'Intestinal type cancers'.)   CLINICAL FEATURES GIM in general does not cause specific symptoms and is often found incidentally in patients undergoing upper endoscopy for dyspepsia. However, intestinal metaplasia is associated with gastric hypochlorhydria, which may result in small intestinal bacterial overgrowth with symptoms of bloating, abdominal discomfort, and diarrhea. (See "Small intestinal bacterial overgrowth: Clinical manifestations and diagnosis", section on 'Clinical features'.)   CANCER RISK  Incidence  Patients with GIM are thought to be at increased risk for gastric cancer but the absolute risk appears to be low in areas of low gastric cancer incidence, such as [place] and [place]. A limited number of studies have evaluated the rate of progression to gastric cancer in patients with GIM, and estimates from small studies have varied widely and depend upon the background incidence of gastric cancer  In , , and [place] populations, the rates of gastric cancer vary between 1.1 and 2.0 per 1000 person-years, and up to 3.0 per 1000 person-years if high-grade dysplasia is included .   Risk factors  Risk for gastric cancer may be higher among individuals with incomplete versus complete GIM on histology, extensive versus limited GIM, and those with a family history of gastric cancer in a first-degree relative . In a meta-analysis of seven studies, having incomplete versus complete GIM was associated with a threefold increased risk of incident gastric cancer (over 3 to 13 years follow-up; relative risk 2 gastritis  .continue esomeprazole  until baclofen is done and no hiccups returned  then decrease dose to 20mg  and then take for two weeks and then every other day  for two weeks and stop 3.hiccups  resolved.  4. hld    to lower  LDL and non HDL  cholesterol levels     1 limit your intake of foods full of saturated fats  , trans fats, and dietary cholesterol .  Food with a lot of saturated fate include butter, fatty flesh like red meat, full fat and low fat dairy  products  , palm oil and coconut oil .   If you see partially hydrogenated fat in the ingredient  list of food label that food has trans fats.  Top sources of dietary chol include egg yolks , organ meats and shell fish.  one Type of fat omega 3 Fatty acids has been shown to protect against heart disease  . Good sources are cold water fish like salmon, mackerel , halibut ., trout  herring and sardines.     Limit  your intake of meat , poultry and fish to no more than 3.5  ounces per day     Eat a lot more fiber rich foods  like beans , oats, barley fruits and vegetables   .  Food naturally rich in soluble fiber  are good at lowering cholesterol .    Excellent choices  are  oats , oat bran , barley , peas , yams sweet potatoes and legumes  or beans .  Good fruit sources are berries passion fruit, oranges pears,, apricots , apples  and nectarines  .    choose protein rich plant foods   such as legumes or beans nuts and seeds over meat.     Lose as much weight as possible and exercise   Take plant sterol supplements   .A Daily intake  of 1-2 gms  of plant sterols  lowers ldl .    Best choice is supplements  such as Cholestoff  by natures made   because they dont have calories  sugar trans fats   an salt  of many foods enriched with plant sterols.    Take  Metamucil or psyllium   .   Studies have shown 9-10 gms as daily of psyllium   the equivalent of  about  3 teaspoons  of sugar free Metamucil   reduced LDL levels  . 5 prediabetes  GLYCEMIC INDEX: Foods can be measured by how much they are likely to raise blood sugar. This is called the glycemic index. We want you to eat mostly foods that have a low glycemic index.  Fruit: choose cherries, grapefruit, prunes, dried apricots, apples, peaches, pears, blueberries, strawberries, oranges, and grapes.  Breads and other starches: Choose pumpernickel, rye, sourdough, or stone-ground whole wheat bread. For cereal, choose bran flakes, oat flakes, and oatmeal. For rice, use long-grained white rice. Most pasta is fairly low on the glycemic index; whole wheat or egg pasta is the lowest. Choose new or sweet potatoes and yams.  Dairy products: Dairy tends to be fairly low on the glycemic index because of the protein and fat in it. Premium ice cream is lower on the glycemic index than low-fat ice cream.  Salty snacks: Hummus, peanuts, walnuts, and cashews are all good choices.  Sweets: Most sweets are high on the glycemic index, so make them special treats only. Ones that have protein and fat in them tend to be a bit lower. Milk chocolate or peanut candies are good choices. Pound and sponge cakes are lower on the glycemic index than other types of cakes. For cookies, choose peanut butter, chocolate chip, butter, and oatmeal cookies. For a breakfast treat, choose scones or oatmeal muffins.  Beans: Choose liza, chickpeas (garbanzo beans), lentils, split peas, lima beans, and kidney beans.  Meat and vegetables are low on the glycemic index. Soups and stews made with meat or vegetables are also good. pt is leaving for [facility] and will return in early may

## 2024-01-03 NOTE — HISTORY OF PRESENT ILLNESS
[de-identified] : Esophagus Additional esophagus findings -proximal and mid esophagus was normal  distal normal but submucosal elevation noted throughout esophagus bx.    Stomach Mucosa Diffuse continuous friability, erythema and congestion of the  mucosa with no bleeding was noted in the fundus, stomach body, antrum, pylorus,  incisura of the stomach and pre-pyloric region. These findings are compatible  with acute gastritis. Bx 3 antrum 4 incisura, 5 lesser curvature 6 greater  curvature.Biopsies were performed.    Abnormal mucosa was noted in the cardia and fundus. Erythematous.    Duodenum Additional duodenum findings -first portion of duodenum erythema bx #1  second portion had white round small areas on mucosa bx taken # 2.    EGD Impressions:    -proximal and mid esophagus was normal distal normal but submucosal elevation  noted throughout esophagus bx.    Friability, erythema and congestion in the fundus, stomach body, antrum,  pylorus, incisura of the stomach and pre-pyloric region compatible with acute  gastritis. (Biopsy).    -first portion of duodenum erythema bx #1 second portion had white round small  areas on mucosa bx taken # 2.    Abnormal mucosa in the cardia and fundus.   Colonoscopy: 10/6/23Left Colon: Good, 2 Transverse Colon: Good, 2 Right Colon: Good, 2 [FreeTextEntry1] : Excavated lesions A few non-bleeding diverticula with small openings were seen  in the the left side of the colon. Diverticulosis appeared to be of mild  severity.    Protruding lesions A single sessile non-bleeding polyp of benign appearance was  found in the rectum. Bx 8.A single-piece polypectomy was performed using a cold  forceps. The polyp was completely removed. Biopsies were performed.    Large non-bleeding grade/stage III internal hemorrhoids were noted. On  hemorrhoid appeared to have a papillomatous like lesion onn the hemmorhoid.    Additional findings The colon was otherwise unremarkable except what was stated.      Colonoscopy Impressions:    Mild diverticulosis of the the left side of the colon.    Polyp in the rectum. (Polypectomy, Biopsy).    Grade/Stage III internal hemorrhoids.    The colon was otherwise unremarkable except what was stated.

## 2024-01-03 NOTE — PHYSICAL EXAM
[Alert] : alert [Normal Voice/Communication] : normal voice/communication [Healthy Appearing] : healthy appearing [No Acute Distress] : no acute distress [Sclera] : the sclera and conjunctiva were normal [Hearing Threshold Finger Rub Not [place]] : hearing was normal [Normal Lips/Gums] : the lips and gums were normal [Oropharynx] : the oropharynx was normal [Normal Appearance] : the appearance of the neck was normal [No Neck Mass] : no neck mass was observed [No Respiratory Distress] : no respiratory distress [No Acc Muscle Use] : no accessory muscle use [Respiration, Rhythm And Depth] : normal respiratory rhythm and effort [Auscultation Breath Sounds / Voice Sounds] : lungs were clear to auscultation bilaterally [Heart Rate And Rhythm] : heart rate was normal and rhythm regular [Normal S1, S2] : normal S1 and S2 [Murmurs] : no murmurs [Bowel Sounds] : normal bowel sounds [Abdomen Tenderness] : non-tender [No Masses] : no abdominal mass palpated [Abdomen Soft] : soft [Cervical Lymph Nodes Enlarged Posterior Bilaterally] : no posterior cervical lymphadenopathy [Cervical Lymph Nodes Enlarged Anterior Bilaterally] : no anterior cervical lymphadenopathy [No CVA Tenderness] : no CVA  tenderness [No Clubbing, Cyanosis] : no clubbing or cyanosis of the fingernails [Abnormal Walk] : normal gait [Motor Tone] : muscle strength and tone were normal [Normal Color / Pigmentation] : normal skin color and pigmentation [] : no rash [Normal Turgor] : normal skin turgor [Motor Exam] : the motor exam was normal [Normal] : oriented to person, place, and time [Oriented To Time, Place, And Person] : oriented to person, place, and time

## 2024-02-15 ENCOUNTER — APPOINTMENT (OUTPATIENT)
Dept: INTERNAL MEDICINE | Facility: CLINIC | Age: 64
End: 2024-02-15

## 2024-05-16 ENCOUNTER — APPOINTMENT (OUTPATIENT)
Dept: INTERNAL MEDICINE | Facility: CLINIC | Age: 64
End: 2024-05-16

## 2024-07-29 ENCOUNTER — APPOINTMENT (OUTPATIENT)
Dept: INTERNAL MEDICINE | Facility: CLINIC | Age: 64
End: 2024-07-29
Payer: MEDICARE

## 2024-07-29 ENCOUNTER — LABORATORY RESULT (OUTPATIENT)
Age: 64
End: 2024-07-29

## 2024-07-29 ENCOUNTER — NON-APPOINTMENT (OUTPATIENT)
Age: 64
End: 2024-07-29

## 2024-07-29 VITALS
WEIGHT: 158 LBS | OXYGEN SATURATION: 97 % | DIASTOLIC BLOOD PRESSURE: 83 MMHG | TEMPERATURE: 97.1 F | SYSTOLIC BLOOD PRESSURE: 149 MMHG | HEIGHT: 67 IN | BODY MASS INDEX: 24.8 KG/M2 | HEART RATE: 64 BPM

## 2024-07-29 DIAGNOSIS — K76.0 FATTY (CHANGE OF) LIVER, NOT ELSEWHERE CLASSIFIED: ICD-10-CM

## 2024-07-29 DIAGNOSIS — Z86.79 PERSONAL HISTORY OF OTHER DISEASES OF THE CIRCULATORY SYSTEM: ICD-10-CM

## 2024-07-29 DIAGNOSIS — F17.200 NICOTINE DEPENDENCE, UNSPECIFIED, UNCOMPLICATED: ICD-10-CM

## 2024-07-29 DIAGNOSIS — Z00.00 ENCOUNTER FOR GENERAL ADULT MEDICAL EXAMINATION W/OUT ABNORMAL FINDINGS: ICD-10-CM

## 2024-07-29 DIAGNOSIS — R10.13 EPIGASTRIC PAIN: ICD-10-CM

## 2024-07-29 DIAGNOSIS — J44.9 CHRONIC OBSTRUCTIVE PULMONARY DISEASE, UNSPECIFIED: ICD-10-CM

## 2024-07-29 DIAGNOSIS — R11.2 NAUSEA WITH VOMITING, UNSPECIFIED: ICD-10-CM

## 2024-07-29 DIAGNOSIS — Z87.898 PERSONAL HISTORY OF OTHER SPECIFIED CONDITIONS: ICD-10-CM

## 2024-07-29 DIAGNOSIS — R20.0 ANESTHESIA OF SKIN: ICD-10-CM

## 2024-07-29 DIAGNOSIS — I70.213 ATHEROSCLEROSIS OF NATIVE ARTERIES OF EXTREMITIES WITH INTERMITTENT CLAUDICATION, BILATERAL LEGS: ICD-10-CM

## 2024-07-29 DIAGNOSIS — D12.6 BENIGN NEOPLASM OF COLON, UNSPECIFIED: ICD-10-CM

## 2024-07-29 DIAGNOSIS — B35.3 TINEA PEDIS: ICD-10-CM

## 2024-07-29 DIAGNOSIS — I10 ESSENTIAL (PRIMARY) HYPERTENSION: ICD-10-CM

## 2024-07-29 DIAGNOSIS — R06.6 HICCOUGH: ICD-10-CM

## 2024-07-29 DIAGNOSIS — K29.60 OTHER GASTRITIS W/OUT BLEEDING: ICD-10-CM

## 2024-07-29 DIAGNOSIS — K82.4 CHOLESTEROLOSIS OF GALLBLADDER: ICD-10-CM

## 2024-07-29 DIAGNOSIS — A53.9 SYPHILIS, UNSPECIFIED: ICD-10-CM

## 2024-07-29 DIAGNOSIS — K64.8 OTHER HEMORRHOIDS: ICD-10-CM

## 2024-07-29 DIAGNOSIS — K31.A0 GASTRIC INTESTINAL METAPLASIA, UNSPECIFIED: ICD-10-CM

## 2024-07-29 DIAGNOSIS — K29.00 ACUTE GASTRITIS W/OUT BLEEDING: ICD-10-CM

## 2024-07-29 PROCEDURE — 93000 ELECTROCARDIOGRAM COMPLETE: CPT

## 2024-07-29 PROCEDURE — 99212 OFFICE O/P EST SF 10 MIN: CPT | Mod: 25

## 2024-07-29 PROCEDURE — G0439: CPT

## 2024-07-29 RX ORDER — ECONAZOLE NITRATE 10 MG/G
1 CREAM TOPICAL TWICE DAILY
Qty: 1 | Refills: 3 | Status: ACTIVE | COMMUNITY
Start: 2024-07-29 | End: 1900-01-01

## 2024-07-29 NOTE — HEALTH RISK ASSESSMENT
[Good] : ~his/her~ current health as good [Excellent] : ~his/her~  mood as  excellent [Yes] : Yes [2 - 3 times a week (3 pts)] : 2 - 3  times a week (3 points) [1 or 2 (0 pts)] : 1 or 2 (0 points) [Never (0 pts)] : Never (0 points) [No] : In the past 12 months have you used drugs other than those required for medical reasons? No [No falls in past year] : Patient reported no falls in the past year [Little interest or pleasure doing things] : 1) Little interest or pleasure doing things [Feeling down, depressed, or hopeless] : 2) Feeling down, depressed, or hopeless [0] : 2) Feeling down, depressed, or hopeless: Not at all (0) [PHQ-2 Negative - No further assessment needed] : PHQ-2 Negative - No further assessment needed [Current] : Current [20 or more] : 20 or more [NO] : No [Patient reported bone density results were normal] : Patient reported bone density results were normal [Patient reported colonoscopy was abnormal] : Patient reported colonoscopy was abnormal [HIV Test offered] : HIV Test offered [Hepatitis C test offered] : Hepatitis C test offered [None] : None [With Significant Other] : lives with significant other [# of Members in Household ___] :  household currently consist of [unfilled] member(s) [Unemployed] : unemployed [College] : College [Single] : single [Sexually Active] : sexually active [Feels Safe at Home] : Feels safe at home [Fully functional (bathing, dressing, toileting, transferring, walking, feeding)] : Fully functional (bathing, dressing, toileting, transferring, walking, feeding) [Fully functional (using the telephone, shopping, preparing meals, housekeeping, doing laundry, using] : Fully functional and needs no help or supervision to perform IADLs (using the telephone, shopping, preparing meals, housekeeping, doing laundry, using transportation, managing medications and managing finances) [Reports changes in hearing] : Reports changes in hearing [Smoke Detector] : smoke detector [Carbon Monoxide Detector] : carbon monoxide detector [Safety elements used in home] : safety elements used in home [Seat Belt] :  uses seat belt [Audit-CScore] : 3 [de-identified] : swimming  [de-identified] : reg  [[id number]] : 0 [Change in mental status noted] : No change in mental status noted [Language] : denies difficulty with language [Behavior] : denies difficulty with behavior [Learning/Retaining New Information] : denies difficulty learning/retaining new information [Handling Complex Tasks] : denies difficulty handling complex tasks [Reasoning] : denies difficulty with reasoning [Spatial Ability and Orientation] : denies difficulty with spatial ability and orientation [Reports changes in vision] : Reports no changes in vision [Reports changes in dental health] : Reports no changes in dental health [Sunscreen] : does not use sunscreen [Travel to Developing Areas] : does not  travel to developing areas [TB Exposure] : is not being exposed to tuberculosis [Caregiver Concerns] : does not have caregiver concerns [BoneDensityDate] : 9/7/23 [ColonoscopyComments] : colon polyps  tubular adenoma.  [ColonoscopyDate] : 10/6/23 [de-identified] : slight decrease in [de-identified] : lat eye exam 1yr [de-identified] : last dental  1yr  [AdvancecareDate] : 07/29/24

## 2024-07-29 NOTE — ASSESSMENT
[FreeTextEntry1] : health He is yp to date with vaccine needs shingles vaccine  he needs eye exam and dental exams psa   and is up to date with colon cancer screening. 2.bmi 24   Weight loss, exercise, and diet control were discussed and are highly encouraged. Treatment options were given such as, aqua therapy, and contacting a nutritionist. Recommended to use the elliptical, stationary bike, less use of treadmill. Mindful eating was explained to the patient Obesity is associated with worsening asthma, shortness of breath, and potential for cardiac disease, diabetes, and other underlying medical conditions. 3. gb polyp   gallbladder polyps Surgery is necessary if polyps cause symptoms or are larger than 1 cm. Doctors also recommend surgery when a polyp has grown by 2 mm or more since the last checkup. True gallbladder polyps are rare, and they can cause gallbladder cancer. The treatment involves surgical removal of the gallbladder Very small polyps, those less than 1 centimeter (or less than 1.5 cm, according to some studies) may not need gallbladder removal surgery, and instead can be regularly monitored by scanning and re-evaluated for any suspicious changes that could indicate gallbladder cancer.   Polyps larger than 1 centimeter in size are more likely to become cancerous, especially those that are 1.5 centimeter across and larger  they have a 46 to 70 percent chance of containing cancer cells.   Monitoring for gallbladder polyps less than 1.5 centimeter should occur every three to six months for up to two years, after which it can be stopped if there have been no changes in the polyps. It isn't recommended that gallbladder polyps smaller than 0.5 centimeter across be treated by having the gallbladder removed. In gallbladder polyps that are that small, the risk of gallbladder cancer is extremely rare.   Gallbladder polyps that appear cancerous can be treated by surgical removal of the gallbladder. For larger gallbladder polyps, cholecystectomy may also be recommended to prevent the development of gallbladder cancer.    Deciding how to treat gallbladder polyps requires using thoughtful balance  weighing the potential risks of surgery against the potential risks of the development of gallbladder cancer. Paying attention to the overall cancer risk and considering careful monitoring of gallbladder polyps can be an effective treatment strategy to preserve your health 4 tinea pedis  order anti fungul  keep feet dry   spray  shower and shoes   5. smoking The immune system is the bodys way of protecting itself from infection and disease. Smoking compromises the immune system, making smokers more likely to have respiratory infections.  Smoking also causes several autoimmune diseases, including Crohns disease and rheumatoid arthritis. It may also play a role in periodic flare-ups of signs and symptoms of autoimmune diseases. Smoking doubles your risk of developing rheumatoid arthritis.  Smoking has recently been linked to type 2 diabetes, also known as adult-onset diabetes. Smokers are 30% to 40% more likely to develop type 2 diabetes than nonsmokers. Additionally, the more cigarettes an individual smokes, the higher the risk for diabetes.  Back to top   How does smoking affect my bones?   Recent studies show a direct relationship between tobacco use and decreased bone density. Smoking is one of many factorsincluding weight, alcohol consumption, and activity levelthat increase your risk for osteoporosis, a condition in which bones weaken and become more likely to fracture.  Significant bone loss has been found in older women and men who smoke. Quitting smoking appears to reduce the risk for low bone mass and fractures. However, it may take several years to lower a former smokers risk.  In addition, smoking from an early age puts women at even higher risk for osteoporosis. Smoking lowers the level of the hormone estrogen in your body, which can cause you to go through menopause earlier, boosting your risk for osteoporosis.  Back to top   How does smoking affect my heart and blood vessels?   The chemicals in tobacco smoke harm your blood cells and damage the function of your heart. This damage increases your risk for: Atherosclerosis, a disease in which a waxy substance called plaque builds up in your arteries Aneurysms, which are bulging blood vessels that can burst and cause death Cardiovascular disease (CVD), which includes: ?Coronary heart disease (CHD), narrow or blocked arteries around the heart ?Heart attack and damage to your arteries ?Heart-related chest pain ?High blood pressure  Coronary Heart disease, where plateletscomponents in the bloodstick together along with proteins for form clots which can then get stuck in the plaque in the walls of arteries and cause heart attacks Peripheral arterial disease (PAD), a condition in which plaque builds up in the arteries that carry blood to the head, organs, and limbs Stroke, which is sudden death of brain cells caused by blood clots or bleeding  Breathing tobacco smoke can even change your blood chemistry and damage your blood vessels. As you inhale smoke, cells that line your bodys blood vessels react to its chemicals. Your heart rate and blood pressure go up and your blood vessels thicken and narrow.  Back to top   How does smoking affect my lungs and breathing?   Every cigarette you smoke damages your breathing and scars your lungs. Smoking causes: Chronic obstructive pulmonary disease (COPD), a disease that gets worse over time and causes wheezing, shortness of breath, chest tightness, and other symptoms Emphysema, a condition in which the walls between the air sacs in your lungs lose their ability to stretch and shrink back. Your lung tissue is destroyed, making it difficult or impossible to breathe. Chronic bronchitis, which causes swelling of the lining of your bronchial tubes. When this happens, less air flows to and from your lungs. Pneumonia Asthma Tuberculosis   People with asthma can suffer severe attacks when around cigarette smoke.  Back to top   Can smoking affect my vision?   Smoking is as bad for your eyes as it is for the rest of your body. Research has linked smoking to an increased risk of developing age-related macular degeneration, cataract, and optic nerve damage, all of which can lead to blindness.  Back to top   Do cigarettes cause cancer?   Tobacco smoke contains more than 7,000 chemicals. About 70 of them are known to cause cancer. Smoking cigarettes is the number-one risk factor for lung cancer. But, smoking can affect your entire body, and is known to cause cancer in the: Lungs Trachea Bronchus Esophagus Oral Cavity Lip Nasopharynx Nasal Cavity Larynx Stomach Bladder Pancreas Kidney Liver Uterine Cervix Colon Rectum   In addition, smoking is known to cause leukemia.  Learn more about cigarettes.  Back to top   Do light cigarettes cause cancer?   There is no such thing as a safe cigarette. People who smoke any kind of cigarette are at an increased risk for smoking-related diseases. Although it is no longer legal to sell light cigarettes, people who smoked light cigarettes in the past are likely to have inhaled the same amount of toxic chemicals as those who smoked regular cigarettes. They remain at high risk of developing smoking-related cancers and other diseases. Learn more about light cigarettes.  Back to top   Do menthol cigarettes cause cancer?   All cigarettes are harmful, including menthol cigarettes. Many smokers think menthol cigarettes are less harmful, but there is no evidence that menthol cigarettes are safer than other cigarettes. Like other cigarettes, menthol cigarettes harm nearly every organ in the body and cause many diseases, including cancer, cardiovascular diseases, and respiratory diseases. Menthol cigarettes, like other cigarettes, also negatively impact male and female fertility and are harmful to pregnant women and their unborn babies.  Some research shows that menthol cigarettes may be more addictive than non-menthol cigarettes. More research is needed to understand how addiction differs between menthol and non-menthol cigarette use. Learn more about menthol cigarettes.  Back to top   Can smoking cigars and pipes cause cancer?   Cigar and pipe smoke, like cigarette smoke, contains toxic and cancer-causing chemicals that are harmful to both smokers and non-smokers. Cigar and pipe smoking causes: Bladder cancer Esophageal cancer Laryngeal (voice box) cancer Lip cancer Lung cancer Mouth cancer Throat cancer Tongue cancer 6 lung cancer screening  I attest that prior to beginning the CT Lung Cancer Screening process:   Reviewed and discussed with patient about the benefits, limitations, and potential harms of lung cancer screening , including: false positive rates which may lead to additional diagnostic testing and /or invasive diagnostic procedures to further evaluate findings, false-negatives which may delay or prevent diagnosis and treatment, anxiety associated with test results, serial lung cancer LDCT screening and/ or additional testing, incidental findings, over diagnosis and total radiation exposure.   Reviewed with and counseled the patient on the importance of adherence to annual lung cancer Low-Dose CT screening, the impact of co morbidities and ability or willingness to undergo diagnosis and treatment, including invasive diagnostic procedures, if there is a positive finding.   Discussed patient's willingness to undergo invasive testing and surgical interventions if indicated clinically.   Reviewed with the patient about the importance of smoking cessation and/or maintaining cigarette smoking abstinence.  Discussed with patient that the lung cancer screening is not a one-time test but a process that involves periodic follow up Low-Dose CT exams over time to look for newly developing lung cancer.   The patient is asymptomatic (no signs/symptoms of lung cancer such as fever, chest pain, new shortness of breath, new or changing cough, coughing up blood, or unexplained significant weight loss).   As part of the lung cancer screening program, multi-disciplinary clinical team will review the results of all positive CT scans and make recommendations based on established screening protocol.    The pt has agreed and decided to proceed with lung cancer screening using Low-Dose CT and consented undergoing additional tests (including additional imaging and/or invasive diagnostic procedures) as recommended by the multi-disciplinary clinical team should this be clinically indicated.   Discussion/SummarySectionEnd   AssessmentSectionStart Assessment Assessment_ The patient was given the contact information of the lung health screening program in case of questions and concerns. Reviewed health promotion/disease prevention/lifestyle changes: Age appropriate screenings, regular exercise and regular follow-up with healthcare provider.  7. arthritis   Pts that consume certain foods can worsen their symptoms of arthritis.  The five worse foods are bagels, gluten, French fries and processed fast foods, trans fats  such as soybean, sunflower oil, omega 6 , vegetable oil and corn oil .  Blackened and barbecue foods  Advanced glycogen enzymes , night shade vegetables such as tomatoes, peppers, potatoes , and sugars.  Foods that help   tart cherries, broccoli, papaya, olive oil probiotics nuts such as almonds, pineapple  green tea.  Papain breaks down protein and bromelin reduces inflammation tumeric destroys free radicals and devils claw reduces arthritis pain.  as well as ginger extract , rutin , [place] yucca root, citrus bioflavonoids  and Boswelia extract. 8 knee pain Activity limitations  To speed recovery and protect against future knee damage, activities that cause pain should be avoided temporarily.  If the knee is swollen or sore, the following positions and activities should be avoided until knee pain and swelling resolve:  ?Squatting  ?Kneeling  ?Twisting and pivoting  ?Jogging  ?Aerobics, dancing  ?Playing sports  ?Swimming using the frog or whip kick   The following types of exercise equipment should be avoided if the knee is swollen or sore, unless specifically prescribed by a physical therapist or physician:  ?Stair stepper  ?Rowing machine  ?Squats  ?Leg extensions   The preferred exercise equipment for the knee should provide smooth motion of the knee, strengthening of the front and back thigh muscles (quadriceps and hamstring muscles), minimal jarring and impact to the joint, and the least amount of bending necessary. These activities are acceptable alternatives to those listed above:  ?Fast walking  ?Water aerobics  ?Swimming using the crawl stroke  ?Cross country ski or elliptical-type machines  ?Soft platform treadmill   Ice and elevation  Ice is useful for the control of pain and swelling. It can be applied to the knee for 15 to 20 minutes as often as every two to four hours, particularly after physical activity. A bag of ice, frozen vegetables, or a frozen towel work well. The swollen knee should be elevated above the level of the heart while icing.  Pain relief  If needed, a non-prescription pain medication such as acetaminophen (Tylenol), ibuprofen (eg, Advil, Motrin) or naproxen (eg, Aleve) can be taken (see "Patient education: Nonsteroidal antiinflammatory drugs (NSAIDs) (Beyond the Basics)"). No more than 3000 mg of acetaminophen is recommended per day. Anyone with liver disease or heavy alcohol use should speak with his or her healthcare provider before taking acetaminophen. Individuals with a history of heart attack, stomach/intestinal bleeding, or kidney disease should speak with their health care provider before taking ibuprofen, naproxen, or other nonsteroidal antiinflammatory drugs (NSAIDs). Individuals taking more than one medicine to lower blood pressure should also be cautious about taking NSAIDs.  Stretching exercises  Depending upon the injury or condition causing pain, patients can begin stretching exercises as soon as the day following an injury. Stretch gently and gradually, holding consistent pressure at the end of the stretch. Avoid "bouncing" or rapid "ballistic" stretches as these can damage already injured tissue. Stretches should be held for 20 to 30 seconds and should be performed on both injured and uninjured legs. Each muscle should be stretched three to five times per session and stretching sessions may be performed one to four times each day.   Hamstring stretch  Sit on the floor or bed with the affected leg extended straight out in front of you. The opposite leg may be bent or may hang off the bed. Keeping the affected leg straight, lean forward and reach for the ankle. Hold for 30 seconds but do not bounce. Sit up straight. Repeat as above.  Quadriceps stretch  Stand behind a chair, holding the top of the chair with one hand. Bend the knee and grab the foot with the hand on the same side of the body. Stand up straight. Gently pull the foot towards the body. Hold for 30 seconds, holding constant pressure on the foot (do not pull-release-pull). Release the foot. Repeat 10 to 15 times.  Runner's stretch  Face a wall and stand 18 to 24 inches away. Place hands at head height and lean into the wall, keeping legs and back straight. You can rest your head on your hands, against the wall. You should feel a stretch in the muscles in the back of the calf. Hold for 30 seconds. Repeat 10 to 15 times.   Strengthening exercises  Rehabilitation of the knee almost always includes strengthening exercises. Patients gradually progress from exercises performed with a straight knee (eg, straight leg raises) to exercises that require some degree of knee bending (eg, squats). Initial exercises often include the following:  Straight leg raises  Sit on the edge of a chair or lie down on the back. Bend the opposite leg (picture 2). Keep the affected leg perfectly straight and raise it 3 to 4 inches off the ground. Hold for 5 seconds. Repeat 10 to 15 times (one set). Perform a total of three sets.  As your condition improves, perform straight leg raises with weights at the ankle; begin with a 2 pound weight and gradually increase to a 5 to 10 pound weight (pennies or fishing weights in an old sock, two cans in a purse, or Velcro ankle weights).  Hip abduction  Lie on your side on the bed or floor. The affected leg should be on top and should be held straight. The bottom leg should be bent. Hold the top leg straight and raise it 3 to 4 inches towards the ceiling. Hold for 5 seconds then slowly release. Repeat 10 to 15 times (one set). Perform a total of three sets.  Hip adduction  Lie on your side on the bed or floor. The affected leg should be on bottom and should be held straight. The top leg should be bent with the foot placed in front of the bottom leg. Lift the bottom leg 3 to 4 inches. Hold for 5 seconds then slowly release. Repeat 10 to 15 times (one set). Perform a total of three sets.  Quarter squats  Stand 18 to 24 inches from a wall. Lean back against the wall. Bend both knees slightly (the buttocks should not be lower than the knees), keeping the back straight (picture 3). Hold for five seconds then slowly stand up straight. Rest as needed. Repeat 10 to 15 times (one set). Perform a total of three sets. To increase the difficulty, bend the knees more deeply, hold for a longer time, and increase the speed.  Alternately, use an exercise ball to perform squats. Stand up straight, holding the ball between your back and the wall. Slowly bend the knees and lower the back (roll the ball down the wall). Hold for a count of five. Stand up. Repeat 10 to 15 times.   9 trigger finger   trigger finger- The goals of treatment of trigger finger are to alleviate pain and to allow smoother movement of the finger with flexion and extension. A variety of therapeutic options ranging from conservative to more invasive are presented below.  Overall approach  Our initial approach to therapy is usually to start with conservative interventions which include activity modification, splinting, and/or short-term nonsteroidal antiinflammatory drugs (NSAIDs). A local glucocorticoid injection may be offered to patients whose symptoms have not resolved with conservative management.  Patients who present with severe symptoms or frequent episodes of triggering may benefit from a glucocorticoid injection at the initial presentation. Surgical release is generally reserved for patients who have failed conservative therapy and have not improved with one or two glucocorticoid injections.  Acute symptoms  We suggest initial management with either activity modification or splinting. Patients may continue with normal activity but avoid potentially aggravating movements such as pinching or grasping of the fingers. Several small observational studies have also shown that immobilization of the metacarpophalangeal (MCP) joint with splinting with a metal finger splint or a custom made thermoplastic splint can also help alleviate pain and reduce triggering . As an example, a study including 28 patients with trigger finger treated with a custom thermoplastic splint found statistically significant improvements across a variety of outcome measures, including the number of triggering events]. In addition, approximately 93 percent of patients felt that their triggering had improved after 6 to 10 weeks of splint wear, and 54 percent of patients felt that their symptoms had completely resolved.  The splint should keep the MCP joint in slight flexion and can be worn based on trigger pattern and patient preferences (eg, daytime use, nighttime use, or use with activity). The suggested duration of splinting is generally three to six weeks. For milder cases, primary care clinicians may suggest taping the affected finger with the adjacent normal fingers (buddy taping) to limit flexion of the finger.  We also suggest a concurrent trial of NSAIDs for pain relief, unless contraindicated by gastrointestinal, renal, or heart disease. NSAIDs should be taken for a maximum duration of two to four weeks. (See "Initial treatment of rheumatoid arthritis in adults", section on 'NSAIDs'.)  Persistent symptoms  For patients whose symptoms do not improve with activity modification, splinting, and/or judicious use of NSAIDs, additional therapeutic options include glucocorticoid injections and surgery.  Glucocorticoid injection  A local glucocorticoid injection is suggested for patients whose symptoms have not resolved after four to six weeks of conservative therapy (eg, splinting)  We use an intermediate-acting glucocorticoid for injection such as methylprednisolone or triamcinolone mixed with a local anesthetic such as lidocaine. As mentioned above, a glucocorticoid injection may be offered sooner to patients who present with severe locking and incomplete finger flexion or extension. The injection may be repeated in six weeks if symptoms have not improved by at least 50 percent, with a maximum of three injections. However, the vast majority of patients experience significant improvement with a single injection. Persistent relief beyond 12 months is expected in approximately 50 percent of patients. Glucocorticoid injections for trigger finger in patients with diabetes are generally less successful .  The efficacy of glucocorticoid injections for trigger finger in adults has been demonstrated in numerous observational studies, with relatively lasting effects . As an example, a study including 366 patients with trigger finger found that at least 45 percent of patients experienced relief lasting up to 10 years after a single glucocorticoid injection  In addition, a systematic review including two randomized trials of poor methodological quality found that glucocorticoid injection was more effective for symptom relief than lidocaine injection alone after four weeks . Another trial randomly assigned 50 patients with trigger finger to receive either local glucocorticoid or placebo injection and found that patients who received a glucocorticoid injection had a significantly greater improvement in pain and reduction in triggering, which persisted during the 12-month follow-up period 10. Fatty Liver: The patient denies any jaundice or pruritus. The patient denies any alcohol use. The patient denies taking large doses of nonsteroidal anti-inflammatory drugs or acetaminophen. The findings are suggestive of fatty liver. The patient and I had a long discussion regarding the risks of fatty liver progressing to cirrhosis. The patient was told of the possible increased risk of developing liver failure, cirrhosis, ascites, GI bleeding secondary to varices, hepatic encephalopathy, bleeding tendencies and liver cancer. The patient was told of the importance of follow-up. The patient was advised to follow up every 6 months for blood work and imaging studies. The patient agreed and will follow up. The patient was advised to lose weight. I recommend a trial of vitamin E supplementation for the fatty liver. If the liver enzymes remain elevated, the patient may require a trial of Pioglitazone for the fatty liver. I recommend avoid alcohol and hepato-toxic agents. The patient was also advised to avoid NSAIDs, Acetaminophen and any other hepatotoxic drugs. The patient was also advised not to share needles, razors, scissors, nail clippers, etc.. The patient is to continue close follow-up in our office for blood work and exams. If the liver enzymes remain elevated, the patient may require a CT guided liver biopsy to assess the liver parenchyma and for possible treatment. We had a long discussion regarding the risks and benefits of the procedure. The patient was told of the risks of bleeding, perforation, infections, emergency surgery and missing lesions. The patient agreed and will follow-up to reassess the symptoms Patient has been counseled on life style interventions, including but not limited to: weight loss (3-5% loss of body weight might improve fat in the liver, while 7-10% needed for potential improvement of other components, including inflammation and scarring of the liver, called fibrosis); healthy diet, avoiding added sugars, sodas, avoiding saturated fats, limiting sodium, avoiding alcohol; and on the importance of regular exercise (> 150 min/week moderate intensity aerobic exercise with at least 2x/week muscle strengthening or exercise as tolerated).  - I explained to patient the natural Hx of NAFLD.   liver  Fatty Liver: The patient denies any jaundice or pruritus. The patient denies any alcohol use. The patient denies taking large doses of nonsteroidal anti-inflammatory drugs or acetaminophen. The findings are suggestive of fatty liver. The patient and I had a long discussion regarding the risks of fatty liver progressing to cirrhosis. The patient was told of the possible increased risk of developing liver failure, cirrhosis, ascites, GI bleeding secondary to varices, hepatic encephalopathy, bleeding tendencies and liver cancer. The patient was told of the importance of follow-up. The patient was advised to follow up every 6 months for blood work and imaging studies. The patient agreed and will follow up. The patient was advised to lose weight. I recommend a trial of vitamin E supplementation for the fatty liver. If the liver enzymes remain elevated, the patient may require a trial of Pioglitazone for the fatty liver. I recommend avoid alcohol and hepato-toxic agents. The patient was also advised to avoid NSAIDs, Acetaminophen and any other hepatotoxic drugs. The patient was also advised not to share needles, razors, scissors, nail clippers, etc.. The patient is to continue close follow-up in our office for blood work and exams. If the liver enzymes remain elevated, the patient may require a CT guided liver biopsy to assess the liver parenchyma and for possible treatment. We had a long discussion regarding the risks and benefits of the procedure. The patient was told of the risks of bleeding, perforation, infections, emergency surgery and missing lesions. The patient agreed and will follow-up to reassess the symptoms Patient has been counseled on life style interventions, including but not limited to: weight loss (3-5% loss of body weight might improve fat in the liver, while 7-10% needed for potential improvement of other components, including inflammation and scarring of the liver, called fibrosis); healthy diet, avoiding added sugars, sodas, avoiding saturated fats, limiting sodium, avoiding alcohol; and on the importance of regular exercise (> 150 min/week moderate intensity aerobic exercise with at least 2x/week muscle strengthening or exercise as tolerated).  - I explained to patient the natural Hx of NAFLD.

## 2024-07-29 NOTE — HISTORY OF PRESENT ILLNESS
[FreeTextEntry1] : cpe  [de-identified] : pt is a 64 yr old man with fatty liver, intestinal metaplasia, spinal stenosis,  hearing loss , hpn, gallbladder polyp , radiculopathy, and claudication who is here for his cpe. He -denies any headaches, nausea, vomiting, fever, chills, sweats, chest pain, chest pressure, diarrhea, constipation, dysphagia, sour taste in the mouth, dizziness, leg swelling, leg pain, myalgias, arthralgias, itchy eyes, itchy ears, heartburn, or reflux.pt has restarted to smoke and is smoking 1pk lasting 4 days.   pt  has pain both knees level 6/10 and has pain going up and down stairs  and doesnt take anything for it.  He also states his right thumb  gets stuck

## 2024-07-29 NOTE — PHYSICAL EXAM
[Well Developed] : well developed [Well Nourished] : well nourished [Conjunctiva] : the conjunctiva were normal in both eyes [PERRL] : pupils were equal in size, round, and reactive to light [EOM Intact] : extraocular movements were intact [Cataract Right Eye] : a cataract was noted in the right eye [Cataract Left Eye] : a cataract was noted in the left eye [Normal Appearance] : was normal in appearance [Neck Supple] : was supple [Rate ___] : at [unfilled] breaths per minute [Normal Rhythm/Effort] : normal respiratory rhythm and effort [Clear Bilaterally] : the lungs were clear to auscultation bilaterally [Normal to Percussion] : the lungs were normal to percussion [5th Left ICS - MCL] : palpated at the 5th LICS in the midclavicular line [Heart Rate ___] : [unfilled] bpm [Rhythm Regular] : regular [Normal Rate] : normal [Normal S1] : normal S1 [Normal S2] : normal S2 [No Murmur] : no murmurs heard [No Pitting Edema] : no pitting edema present [2+] : left 2+ [No Abnormalities] : the abdominal aorta was not enlarged and no bruit was heard [Examination Of The Breasts] : a normal appearance [Soft, Nontender] : the abdomen was soft and nontender [No Mass] : no masses were palpated [No HSM] : no hepatosplenomegaly noted [] : which was reducible [Abdomen Hernia Umbilical] : an umbilical hernia was present [Penis Abnormality] : normal circumcised penis [Urinary Bladder Findings] : the bladder was normal on palpation [Scrotum] : the scrotum was normal [Testes Tenderness] : no tenderness of the testes [Testes Mass (___cm)] : there were no testicular masses [No Lymphangitis] : no lymphangitis observed [Normal Kyphosis] : normal kyphosis [No Visual Abnormalities] : no visible abnormalities [Normal Lordosis] : normal lordosis [No Scoliosis] : no scoliosis [No Tenderness to Palpation] : no spine tenderness on palpation [No Masses] : no masses [Full ROM] : full ROM [No Pain with ROM] : no pain with motion in any direction [Intact] : all reflexes within normal limits bilaterally [Normal Station and Gait] : the gait and station were normal [Normal Motor Tone] : the muscle tone was normal [Involuntary Movements] : no involuntary movements were seen [Normal Scalp] : inspection of the scalp showed no abnormalities [Examination Of The Hair] : texture and distribution of hair was normal [Complexion Dark] : dark complexion [Skin Lesions 1] : Skin lesion: [Multiple Tattoos] : multiple tattoos observed [Normal] : the deep tendon reflexes were normal [Normal Mental Status] : the patient's orientation, memory, attention, language and fund of knowledge were normal [Appropriate] : appropriate [Enlarged Diffusely] : was not enlarged [JVP Elevated ___cm] : the JVP was not elevated [S3] : no S3 [S4] : no S4 [Rt] : no varicose veins of the right leg [Lt] : no varicose veins of the left leg [Right Carotid Bruit] : no bruit heard over the right carotid [Left Carotid Bruit] : no bruit heard over the left carotid [Right Femoral Bruit] : no bruit heard over the right femoral artery [Left Femoral Bruit] : no bruit heard over the left femoral artery [Bruit] : no bruit heard [Postauricular Lymph Nodes Enlarged Bilaterally] : nodes not enlarged [Preauricular Lymph Nodes Enlarged Bilaterally] : nodes not enlarged [Submandibular Lymph Nodes Enlarged Bilaterally] : nodes not enlarged [Suboccipital Lymph Nodes Enlarged Bilaterally] : nodes not enlarged [Submental Lymph Nodes Enlarged] : nodes not enlarged [Cervical Lymph Nodes Enlarged Posterior Bilaterally] : nodes not enlarged [Cervical Lymph Nodes Enlarged Anterior Bilaterally] : nodes not enlarged [Supraclavicular Lymph Nodes Enlarged Bilaterally] : nodes not enlarged [Axillary Lymph Nodes Enlarged Bilaterally] : nodes not enlarged [Epitrochlear Lymph Nodes Enlarged Bilaterally] : nodes not enlarged [Femoral Lymph Nodes Enlarged Bilaterally] : nodes not enlarged [Inguinal Lymph Nodes Enlarged Bilaterally] : nodes not enlarged [Abnormal Color] : normal color and pigmentation [Skin Turgor Decreased] : normal skin turgor [Impaired judgment] : intact judgment [Impaired Insight] : intact insight [de-identified] : tongue normal  teeth in good repair

## 2024-07-29 NOTE — COUNSELING
[Fall prevention counseling provided] : Fall prevention counseling provided [Adequate lighting] : Adequate lighting [No throw rugs] : No throw rugs [Use proper foot wear] : Use proper foot wear [Sleep ___ hours/day] : Sleep [unfilled] hours/day [Engage in a relaxing activity] : Engage in a relaxing activity [Plan in advance] : Plan in advance [Weigh Self Weekly] : weigh self weekly [Decrease Portions] : decrease portions [____ min/wk Activity] : [unfilled] min/wk activity [Keep Food Diary] : keep food diary [None] : None [Good understanding] : Patient has a good understanding of lifestyle changes and steps needed to achieve self management goal [FreeTextEntry2] : bmi 24 weight 158

## 2024-07-30 LAB
25(OH)D3 SERPL-MCNC: 30.5 NG/ML
ALBUMIN SERPL ELPH-MCNC: 4.5 G/DL
ALP BLD-CCNC: 117 U/L
ALT SERPL-CCNC: 19 U/L
ANION GAP SERPL CALC-SCNC: 15 MMOL/L
APPEARANCE: CLEAR
AST SERPL-CCNC: 21 U/L
BASOPHILS # BLD AUTO: 0.04 K/UL
BASOPHILS NFR BLD AUTO: 0.7 %
BILIRUB SERPL-MCNC: 0.4 MG/DL
BILIRUBIN URINE: NEGATIVE
BLOOD URINE: NEGATIVE
BUN SERPL-MCNC: 17 MG/DL
C TRACH RRNA SPEC QL NAA+PROBE: NOT DETECTED
CALCIUM SERPL-MCNC: 9.8 MG/DL
CHLORIDE SERPL-SCNC: 107 MMOL/L
CHOLEST SERPL-MCNC: 193 MG/DL
CO2 SERPL-SCNC: 21 MMOL/L
COLOR: YELLOW
CREAT SERPL-MCNC: 0.96 MG/DL
EGFR: 87 ML/MIN/1.73M2
EOSINOPHIL # BLD AUTO: 0.1 K/UL
EOSINOPHIL NFR BLD AUTO: 1.7 %
GLUCOSE QUALITATIVE U: NEGATIVE MG/DL
GLUCOSE SERPL-MCNC: 95 MG/DL
HCT VFR BLD CALC: 44.6 %
HDLC SERPL-MCNC: 62 MG/DL
HGB BLD-MCNC: 14.8 G/DL
HIV1+2 AB SPEC QL IA.RAPID: NONREACTIVE
IMM GRANULOCYTES NFR BLD AUTO: 0.2 %
KETONES URINE: NEGATIVE MG/DL
LDLC SERPL CALC-MCNC: 104 MG/DL
LEUKOCYTE ESTERASE URINE: ABNORMAL
LYMPHOCYTES # BLD AUTO: 2.66 K/UL
LYMPHOCYTES NFR BLD AUTO: 44.1 %
MAN DIFF?: NORMAL
MCHC RBC-ENTMCNC: 32.6 PG
MCHC RBC-ENTMCNC: 33.2 GM/DL
MCV RBC AUTO: 98.2 FL
MONOCYTES # BLD AUTO: 0.4 K/UL
MONOCYTES NFR BLD AUTO: 6.6 %
N GONORRHOEA RRNA SPEC QL NAA+PROBE: NOT DETECTED
NEUTROPHILS # BLD AUTO: 2.82 K/UL
NEUTROPHILS NFR BLD AUTO: 46.7 %
NITRITE URINE: NEGATIVE
NONHDLC SERPL-MCNC: 131 MG/DL
PH URINE: 6.5
PLATELET # BLD AUTO: 162 K/UL
POTASSIUM SERPL-SCNC: 4.2 MMOL/L
PROT SERPL-MCNC: 6.9 G/DL
PROTEIN URINE: NEGATIVE MG/DL
PSA SERPL-MCNC: 4.08 NG/ML
RBC # BLD: 4.54 M/UL
RBC # FLD: 14.1 %
SODIUM SERPL-SCNC: 142 MMOL/L
SOURCE AMPLIFICATION: NORMAL
SPECIFIC GRAVITY URINE: 1.03
T PALLIDUM AB SER QL IA: NEGATIVE
TRIGL SERPL-MCNC: 153 MG/DL
UROBILINOGEN URINE: 1 MG/DL
WBC # FLD AUTO: 6.03 K/UL

## 2024-07-31 LAB
HBV CORE IGG+IGM SER QL: REACTIVE
HBV SURFACE AB SER QL: REACTIVE
HBV SURFACE AG SER QL: NONREACTIVE
HCV AB SER QL: NONREACTIVE
HCV S/CO RATIO: 0.09 S/CO
HEPATITIS A IGG ANTIBODY: NONREACTIVE

## 2024-08-02 ENCOUNTER — APPOINTMENT (OUTPATIENT)
Dept: UROLOGY | Facility: CLINIC | Age: 64
End: 2024-08-02
Payer: MEDICARE

## 2024-08-02 VITALS
HEART RATE: 73 BPM | WEIGHT: 158 LBS | HEIGHT: 67 IN | DIASTOLIC BLOOD PRESSURE: 73 MMHG | OXYGEN SATURATION: 98 % | BODY MASS INDEX: 24.8 KG/M2 | TEMPERATURE: 97.88 F | SYSTOLIC BLOOD PRESSURE: 127 MMHG

## 2024-08-02 DIAGNOSIS — N40.0 BENIGN PROSTATIC HYPERPLASIA WITHOUT LOWER URINARY TRACT SYMPMS: ICD-10-CM

## 2024-08-02 DIAGNOSIS — N52.9 MALE ERECTILE DYSFUNCTION, UNSPECIFIED: ICD-10-CM

## 2024-08-02 DIAGNOSIS — R97.20 ELEVATED PROSTATE, SPECIFIC ANTIGEN [PSA]: ICD-10-CM

## 2024-08-02 PROCEDURE — 99214 OFFICE O/P EST MOD 30 MIN: CPT

## 2024-08-05 NOTE — END OF VISIT
[FreeTextEntry4] : This note was written by José Crews on 08/02/2024 actively solely Collin Deal M.D. I, José Crews, am scribing for and in the presence of Collin Deal M.D. in the following sections HISTORY OF PRESENT ILLNESS, PAST MEDICAL/FAMILY/SOCIAL HISTORY; REVIEW OF SYSTEMS; VITAL SIGNS; PHYSICAL EXAM; ASSESSMENT/PLAN.  All medical record entries made by this scribe at my, Collin Deal M.D. direction and personally dictated by me on 08/02/2024. I personally performed the services described in the documentation, reviewed the documentation recorded by the scribe in my presence, and it accurately and completely records my words and actions.

## 2024-08-05 NOTE — ASSESSMENT
[FreeTextEntry1] : 64 year old male with f/u visit. Reviewed previous PSA with pt.  Informed pt PSA is stable and decreasing and should just watch. If needed an MRI could be done for further eval if preferred.  Rx refill of Tadalafil reviewed side effects with pt.

## 2024-08-05 NOTE — HISTORY OF PRESENT ILLNESS
[FreeTextEntry1] : 08/24/2023 Elevated PSA Patient is here with an elevated PSA. He has no personal history and no family history of prostate cancer.He has no prior genitourinary history of hematuria, hematospermia, prostatitis, UTI, erectile dysfunction, urolithiasis, epididymal orchitis. No dysuria or hematuria psawas 7 repeat 3.89 now 4.2 poor erections last few years now nocturia x1 occasional intermittent flow no dysuria or hematuria  08/02/2024 cc:f/u visit  64 year old male presents with f/u visit. He reported he's here after a year physical and states the he was off the Tadalafil for 2 months and states it helped with his nocturia. He reports his erections have improved  Last PSA: 4.08

## 2024-09-12 ENCOUNTER — NON-APPOINTMENT (OUTPATIENT)
Age: 64
End: 2024-09-12

## 2024-09-12 VITALS — HEIGHT: 67 IN | WEIGHT: 158 LBS | BODY MASS INDEX: 24.8 KG/M2

## 2024-09-12 DIAGNOSIS — Z87.891 PERSONAL HISTORY OF NICOTINE DEPENDENCE: ICD-10-CM

## 2024-09-12 NOTE — HISTORY OF PRESENT ILLNESS
[Former] : Former [TextBox_13] : Patient is scheduled for a annual  LDCT for lung cancer screening. Chart review performed to confirm eligibility for LDCT.    No documented personal or family history of lung cancer. No documented s/s of lung cancer. Patient is a former smoker (2023) with a 30 pack year hx. [PacksperYear] : 30

## 2024-09-13 ENCOUNTER — APPOINTMENT (OUTPATIENT)
Dept: ULTRASOUND IMAGING | Facility: IMAGING CENTER | Age: 64
End: 2024-09-13
Payer: MEDICARE

## 2024-09-13 ENCOUNTER — APPOINTMENT (OUTPATIENT)
Dept: CT IMAGING | Facility: IMAGING CENTER | Age: 64
End: 2024-09-13
Payer: MEDICARE

## 2024-09-13 PROCEDURE — 71271 CT THORAX LUNG CANCER SCR C-: CPT | Mod: 26

## 2024-09-13 PROCEDURE — 76700 US EXAM ABDOM COMPLETE: CPT | Mod: 26

## 2025-05-07 NOTE — H&P PST ADULT - CIGARETTE, PACK YRS
Problem Visit    Meka Garcia is a 62 y.o. presenting for problem visit. Her main concern today is hormone replacement therapy discussion.  She has never been on hormone replacement therapy before.  She denies any significant vasomotor symptoms.  She is having some joint pain.      Ob/Gyn Hx:  [id number]  LMP - No LMP recorded. Patient has had a hysterectomy.  Menses  - hysterectomy  Contraception - s/p hysterectomy with Dr. Jewels Watts for uterine leiomyomas.  Hx of STI - denies  SA - yes     Health maintenance:  Last Pap: prior to hysterectomy - denies abnormal hx       Past Medical History:   Diagnosis Date    Adhesive capsulitis of shoulder 2014    L shoulder- Improved w/ dry needling    Fibroids     Mass of parotid gland 10/19/2011    2011- resolved w/o treatment & prior to bx     Sun-damaged skin     Sunburn, blistering        Past Surgical History:   Procedure Laterality Date    COLONOSCOPY  2012    COLONOSCOPY N/A 2018    grade 1 int hemorrhoids; o/w normal    HYSTERECTOMY, TOTAL ABDOMINAL (CERVIX REMOVED)  2009    ovaries left    KNEE SURGERY Right 2015    TONSILLECTOMY         Family History   Problem Relation Age of Onset    Cancer Mother 73        unknown primary    Cancer Father         bladder    Heart Surgery Father 86        valve replacement    No Known Problems Sister     No Known Problems Brother     Other Brother 24        osteosarcoma    No Known Problems Daughter     No Known Problems Daughter     Other Daughter         h/o anorexia    No Known Problems Daughter     No Known Problems Son     No Known Problems Son        Social History     Socioeconomic History    Marital status:      Spouse name: Not on file    Number of children: Not on file    Years of education: Not on file    Highest education level: Not on file   Occupational History    Not on file   Tobacco Use    Smoking status: Never    Smokeless tobacco: Never   Vaping Use    Vaping status: Never Used  22

## (undated) DEVICE — TUBING IV SET GRAVITY 3Y 100" MACRO

## (undated) DEVICE — FORCEP BIOPSY 2.5MM DISP

## (undated) DEVICE — FORCEP RADIAL JAW 4 W NDL 2.2MM 2.8MM 240CM ORANGE DISP

## (undated) DEVICE — LUBRICATING JELLY ONESHOT 1.25OZ

## (undated) DEVICE — ADAPTER ENDO CHNL SINGLE USE

## (undated) DEVICE — SOLIDIFIER ISOLYZER 2000 CC

## (undated) DEVICE — SENSOR O2 FINGER ADULT

## (undated) DEVICE — SNARE LOOP POLY DISP 30MM LOOP

## (undated) DEVICE — STERIS DEFENDO 3-PIECE KIT (AIR/WATER, SUCTION & BIOPSY VALVES)

## (undated) DEVICE — FORMALIN CUPS 10% BUFFERED

## (undated) DEVICE — SOL INJ NS 0.9% 500ML 1-PORT

## (undated) DEVICE — DRSG CURITY GAUZE SPONGE 4 X 4" 12-PLY

## (undated) DEVICE — NDL INJ SCLERO INTERJECT 23G

## (undated) DEVICE — VENODYNE/SCD SLEEVE CALF MEDIUM

## (undated) DEVICE — CLAMP BX HOT RAD JAW 3

## (undated) DEVICE — CATH ELCTR GLIDE PRB 7FR

## (undated) DEVICE — VALVE ENDO SURESEAL II 0-5FR

## (undated) DEVICE — MASK LRG MED AND HIGH O2 CONC M TO M 10FT

## (undated) DEVICE — BITE BLOCK ADULT 20 X 27MM (GREEN)

## (undated) DEVICE — TUBING CANNULA SALTER LABS NASAL ADULT 7FT

## (undated) DEVICE — TUBING MEDI-VAC W MAXIGRIP CONNECTORS 1/4"X6'

## (undated) DEVICE — SYR LUER LOK 50CC

## (undated) DEVICE — KIT ENDO PROCEDURE CUST W/VLV

## (undated) DEVICE — RETRIEVER ROTH NET PLATINUM-UNIVERSAL